# Patient Record
Sex: FEMALE | Race: WHITE | Employment: FULL TIME | URBAN - METROPOLITAN AREA
[De-identification: names, ages, dates, MRNs, and addresses within clinical notes are randomized per-mention and may not be internally consistent; named-entity substitution may affect disease eponyms.]

---

## 2018-03-26 ENCOUNTER — HOSPITAL ENCOUNTER (OUTPATIENT)
Age: 53
Discharge: HOME | End: 2018-03-26

## 2018-03-26 ENCOUNTER — HOSPITAL ENCOUNTER (OUTPATIENT)
Dept: GENERAL RADIOLOGY | Age: 53
Discharge: HOME | End: 2018-03-28

## 2018-03-26 ENCOUNTER — HOSPITAL ENCOUNTER (OUTPATIENT)
Age: 53
End: 2018-03-26

## 2018-03-26 ENCOUNTER — OFFICE VISIT (OUTPATIENT)
Dept: FAMILY MEDICINE CLINIC | Age: 53
End: 2018-03-26
Payer: MEDICAID

## 2018-03-26 VITALS
OXYGEN SATURATION: 96 % | TEMPERATURE: 98 F | BODY MASS INDEX: 34.38 KG/M2 | HEIGHT: 63 IN | RESPIRATION RATE: 18 BRPM | WEIGHT: 194 LBS | HEART RATE: 88 BPM | SYSTOLIC BLOOD PRESSURE: 150 MMHG | DIASTOLIC BLOOD PRESSURE: 94 MMHG

## 2018-03-26 DIAGNOSIS — M25.572 CHRONIC PAIN OF LEFT ANKLE: ICD-10-CM

## 2018-03-26 DIAGNOSIS — G89.29 CHRONIC PAIN OF LEFT ANKLE: ICD-10-CM

## 2018-03-26 DIAGNOSIS — R04.2 HEMOPTYSIS: ICD-10-CM

## 2018-03-26 DIAGNOSIS — J44.9 CHRONIC OBSTRUCTIVE PULMONARY DISEASE, UNSPECIFIED COPD TYPE (HCC): ICD-10-CM

## 2018-03-26 DIAGNOSIS — G56.03 BILATERAL CARPAL TUNNEL SYNDROME: ICD-10-CM

## 2018-03-26 DIAGNOSIS — I10 ESSENTIAL HYPERTENSION: ICD-10-CM

## 2018-03-26 DIAGNOSIS — F32.1 MODERATE SINGLE CURRENT EPISODE OF MAJOR DEPRESSIVE DISORDER (HCC): Primary | ICD-10-CM

## 2018-03-26 DIAGNOSIS — I20.8 STABLE ANGINA PECTORIS (HCC): ICD-10-CM

## 2018-03-26 LAB
ALBUMIN SERPL-MCNC: 3.8 G/DL (ref 3.5–5.2)
ALP BLD-CCNC: 87 U/L (ref 35–104)
ALT SERPL-CCNC: 13 U/L (ref 0–32)
ANION GAP SERPL CALCULATED.3IONS-SCNC: 12 MMOL/L (ref 7–16)
AST SERPL-CCNC: 13 U/L (ref 0–31)
BILIRUB SERPL-MCNC: 0.3 MG/DL (ref 0–1.2)
BUN BLDV-MCNC: 10 MG/DL (ref 6–20)
CALCIUM SERPL-MCNC: 9.2 MG/DL (ref 8.6–10.2)
CHLORIDE BLD-SCNC: 102 MMOL/L (ref 98–107)
CHOLESTEROL, TOTAL: 251 MG/DL (ref 0–199)
CO2: 26 MMOL/L (ref 22–29)
CREAT SERPL-MCNC: 0.6 MG/DL (ref 0.5–1)
GFR AFRICAN AMERICAN: >60
GFR NON-AFRICAN AMERICAN: >60 ML/MIN/1.73
GLUCOSE BLD-MCNC: 77 MG/DL (ref 74–109)
HCT VFR BLD CALC: 48.4 % (ref 34–48)
HDLC SERPL-MCNC: 34 MG/DL
HEMOGLOBIN: 15.9 G/DL (ref 11.5–15.5)
LDL CHOLESTEROL CALCULATED: 166 MG/DL (ref 0–99)
MCH RBC QN AUTO: 32.3 PG (ref 26–35)
MCHC RBC AUTO-ENTMCNC: 32.9 % (ref 32–34.5)
MCV RBC AUTO: 98.2 FL (ref 80–99.9)
PDW BLD-RTO: 13.1 FL (ref 11.5–15)
PLATELET # BLD: 345 E9/L (ref 130–450)
PMV BLD AUTO: 9.1 FL (ref 7–12)
POTASSIUM SERPL-SCNC: 4.3 MMOL/L (ref 3.5–5)
RBC # BLD: 4.93 E12/L (ref 3.5–5.5)
SODIUM BLD-SCNC: 140 MMOL/L (ref 132–146)
TOTAL PROTEIN: 6.9 G/DL (ref 6.4–8.3)
TRIGL SERPL-MCNC: 256 MG/DL (ref 0–149)
TSH SERPL DL<=0.05 MIU/L-ACNC: 1.15 UIU/ML (ref 0.27–4.2)
VLDLC SERPL CALC-MCNC: 51 MG/DL
WBC # BLD: 9.7 E9/L (ref 4.5–11.5)

## 2018-03-26 PROCEDURE — 93010 ELECTROCARDIOGRAM REPORT: CPT | Performed by: FAMILY MEDICINE

## 2018-03-26 PROCEDURE — 93005 ELECTROCARDIOGRAM TRACING: CPT | Performed by: FAMILY MEDICINE

## 2018-03-26 PROCEDURE — 93005 ELECTROCARDIOGRAM TRACING: CPT

## 2018-03-26 PROCEDURE — 85027 COMPLETE CBC AUTOMATED: CPT

## 2018-03-26 PROCEDURE — 80053 COMPREHEN METABOLIC PANEL: CPT

## 2018-03-26 PROCEDURE — 80061 LIPID PANEL: CPT

## 2018-03-26 PROCEDURE — 99212 OFFICE O/P EST SF 10 MIN: CPT | Performed by: FAMILY MEDICINE

## 2018-03-26 PROCEDURE — 36415 COLL VENOUS BLD VENIPUNCTURE: CPT

## 2018-03-26 PROCEDURE — 73610 X-RAY EXAM OF ANKLE: CPT

## 2018-03-26 PROCEDURE — 84443 ASSAY THYROID STIM HORMONE: CPT

## 2018-03-26 PROCEDURE — 99214 OFFICE O/P EST MOD 30 MIN: CPT | Performed by: FAMILY MEDICINE

## 2018-03-26 RX ORDER — PAROXETINE HYDROCHLORIDE 20 MG/1
20 TABLET, FILM COATED ORAL DAILY
Qty: 30 TABLET | Refills: 3 | Status: SHIPPED | OUTPATIENT
Start: 2018-03-26 | End: 2018-05-29 | Stop reason: SDUPTHER

## 2018-03-26 RX ORDER — ALBUTEROL SULFATE 90 MCG
2 HFA AEROSOL WITH ADAPTER (GRAM) INHALATION EVERY 6 HOURS PRN
Qty: 1 INHALER | Refills: 3 | Status: SHIPPED | OUTPATIENT
Start: 2018-03-26 | End: 2018-06-15 | Stop reason: SDUPTHER

## 2018-03-26 ASSESSMENT — PATIENT HEALTH QUESTIONNAIRE - PHQ9
SUM OF ALL RESPONSES TO PHQ QUESTIONS 1-9: 15
7. TROUBLE CONCENTRATING ON THINGS, SUCH AS READING THE NEWSPAPER OR WATCHING TELEVISION: 1
5. POOR APPETITE OR OVEREATING: 0
SUM OF ALL RESPONSES TO PHQ9 QUESTIONS 1 & 2: 6
9. THOUGHTS THAT YOU WOULD BE BETTER OFF DEAD, OR OF HURTING YOURSELF: 1
1. LITTLE INTEREST OR PLEASURE IN DOING THINGS: 3
6. FEELING BAD ABOUT YOURSELF - OR THAT YOU ARE A FAILURE OR HAVE LET YOURSELF OR YOUR FAMILY DOWN: 0
3. TROUBLE FALLING OR STAYING ASLEEP: 3
10. IF YOU CHECKED OFF ANY PROBLEMS, HOW DIFFICULT HAVE THESE PROBLEMS MADE IT FOR YOU TO DO YOUR WORK, TAKE CARE OF THINGS AT HOME, OR GET ALONG WITH OTHER PEOPLE: 2
8. MOVING OR SPEAKING SO SLOWLY THAT OTHER PEOPLE COULD HAVE NOTICED. OR THE OPPOSITE, BEING SO FIGETY OR RESTLESS THAT YOU HAVE BEEN MOVING AROUND A LOT MORE THAN USUAL: 1
4. FEELING TIRED OR HAVING LITTLE ENERGY: 3
2. FEELING DOWN, DEPRESSED OR HOPELESS: 3

## 2018-03-26 NOTE — PROGRESS NOTES
CC:  Follow up chronic left ankle pain, new depression, chest pain. HPI:  50 y.o. female presents for follow up. Diffuse pain, arthritis. Screws in left ankle remain painful. Using cane sometimes. Has not seen ortho recently. Still with pain in all joints, but able to work. COPD, Chronic cough, now a little better, but previously very severe. Had been coughing up blood about 6 months ago, and this gradually improved over following months, but did not seek care at that time. Wants lungs checked again. Still smoking, cutting down. Strong family history of throat and lung CA. Feels she is losing some weight without trying. Elevated BP today, and has been a little higher recently. Does feel stressed. Has had occasional CP with exertion, chronic cough/SOB with COPD. No new symptoms. No HA. Has not taken medication for BP in the past.      Feeling down, depressed. Pain makes this worse. Happening for past 6 months. Has not sought help. Tired, fatigue. Physically and mentally tired. Thoughts about son and dogs keep her from harming herself. Thought about self-harm before, but not currently. Denies SI or HI currently. Lives with ex-, who is supportive of her. Sold gun, no gun in the house. No plans to get another gun. Contracts for safety. Feels anxious often. Stressful job, side of road, [address] Not sleeping well, 3 hours per night. Worries overnight. Feels shaky. Gets up, has cup of coffee. No screen time. Same appetite. Lost some weight without trying, about 13 pounds. Feels down, tearful. Hopeless, guilt. Less interest. No recent menses. Had spotting about 11 months ago, last several months before that. No recent sexual activity. Heaviness in chest with activity, middle of chest, with exertion. Happening for several weeks/months. Some coughing and wheezing. Reproducible with certain amount of activity, lasts about 5-10 minutes. Pressure sensation.   Some

## 2018-03-30 DIAGNOSIS — G89.29 CHRONIC PAIN OF LEFT ANKLE: Primary | ICD-10-CM

## 2018-03-30 DIAGNOSIS — M25.572 CHRONIC PAIN OF LEFT ANKLE: Primary | ICD-10-CM

## 2018-03-30 DIAGNOSIS — T84.9XXS: ICD-10-CM

## 2018-04-24 ENCOUNTER — HOSPITAL ENCOUNTER (OUTPATIENT)
Dept: CT IMAGING | Age: 53
Discharge: HOME | End: 2018-04-26

## 2018-04-24 ENCOUNTER — OFFICE VISIT (OUTPATIENT)
Dept: ORTHOPEDIC SURGERY | Age: 53
End: 2018-04-24

## 2018-04-24 VITALS
WEIGHT: 195 LBS | TEMPERATURE: 97.6 F | RESPIRATION RATE: 18 BRPM | BODY MASS INDEX: 34.55 KG/M2 | SYSTOLIC BLOOD PRESSURE: 140 MMHG | HEART RATE: 79 BPM | DIASTOLIC BLOOD PRESSURE: 92 MMHG | HEIGHT: 63 IN

## 2018-04-24 DIAGNOSIS — M19.172 POST-TRAUMATIC ARTHRITIS OF LEFT ANKLE: ICD-10-CM

## 2018-04-24 DIAGNOSIS — R04.2 HEMOPTYSIS: ICD-10-CM

## 2018-04-24 PROCEDURE — 71260 CT THORAX DX C+: CPT

## 2018-04-24 PROCEDURE — 6360000004 HC RX CONTRAST MEDICATION: Performed by: RADIOLOGY

## 2018-04-24 PROCEDURE — 99203 OFFICE O/P NEW LOW 30 MIN: CPT

## 2018-04-24 PROCEDURE — 2580000003 HC RX 258: Performed by: RADIOLOGY

## 2018-04-24 PROCEDURE — 99203 OFFICE O/P NEW LOW 30 MIN: CPT | Performed by: PHYSICIAN ASSISTANT

## 2018-04-24 RX ORDER — SODIUM CHLORIDE 0.9 % (FLUSH) 0.9 %
10 SYRINGE (ML) INJECTION
Status: COMPLETED | OUTPATIENT
Start: 2018-04-24 | End: 2018-04-24

## 2018-04-24 RX ADMIN — Medication 10 ML: at 09:36

## 2018-04-24 RX ADMIN — IOPAMIDOL 90 ML: 755 INJECTION, SOLUTION INTRAVENOUS at 09:01

## 2018-05-04 ENCOUNTER — TELEPHONE (OUTPATIENT)
Dept: FAMILY MEDICINE CLINIC | Age: 53
End: 2018-05-04

## 2018-05-04 DIAGNOSIS — Z91.030 BEE STING ALLERGY: Primary | ICD-10-CM

## 2018-05-04 RX ORDER — EPINEPHRINE 0.3 MG/.3ML
INJECTION SUBCUTANEOUS
Qty: 2 EACH | Refills: 1 | Status: SHIPPED
Start: 2018-05-04 | End: 2020-07-19 | Stop reason: SDUPTHER

## 2018-05-13 ENCOUNTER — APPOINTMENT (OUTPATIENT)
Dept: GENERAL RADIOLOGY | Age: 53
End: 2018-05-13

## 2018-05-13 ENCOUNTER — HOSPITAL ENCOUNTER (EMERGENCY)
Age: 53
Discharge: HOME | End: 2018-05-13
Attending: EMERGENCY MEDICINE

## 2018-05-13 VITALS
TEMPERATURE: 98.5 F | DIASTOLIC BLOOD PRESSURE: 81 MMHG | HEART RATE: 93 BPM | BODY MASS INDEX: 33.66 KG/M2 | WEIGHT: 190 LBS | RESPIRATION RATE: 20 BRPM | SYSTOLIC BLOOD PRESSURE: 137 MMHG | OXYGEN SATURATION: 95 %

## 2018-05-13 DIAGNOSIS — J44.1 COPD EXACERBATION (HCC): ICD-10-CM

## 2018-05-13 DIAGNOSIS — J40 BRONCHITIS: Primary | ICD-10-CM

## 2018-05-13 LAB
ALBUMIN SERPL-MCNC: 4 G/DL (ref 3.5–5.2)
ALP BLD-CCNC: 100 U/L (ref 35–104)
ALT SERPL-CCNC: 17 U/L (ref 0–32)
ANION GAP SERPL CALCULATED.3IONS-SCNC: 12 MMOL/L (ref 7–16)
AST SERPL-CCNC: 15 U/L (ref 0–31)
BILIRUB SERPL-MCNC: 0.3 MG/DL (ref 0–1.2)
BUN BLDV-MCNC: 8 MG/DL (ref 6–20)
CALCIUM SERPL-MCNC: 9 MG/DL (ref 8.6–10.2)
CHLORIDE BLD-SCNC: 100 MMOL/L (ref 98–107)
CO2: 26 MMOL/L (ref 22–29)
CREAT SERPL-MCNC: 0.7 MG/DL (ref 0.5–1)
GFR AFRICAN AMERICAN: >60
GFR NON-AFRICAN AMERICAN: >60 ML/MIN/1.73
GLUCOSE BLD-MCNC: 124 MG/DL (ref 74–109)
HCT VFR BLD CALC: 50 % (ref 34–48)
HEMOGLOBIN: 16.6 G/DL (ref 11.5–15.5)
INFLUENZA A BY PCR: NOT DETECTED
INFLUENZA B BY PCR: NOT DETECTED
MCH RBC QN AUTO: 32.3 PG (ref 26–35)
MCHC RBC AUTO-ENTMCNC: 33.2 % (ref 32–34.5)
MCV RBC AUTO: 97.3 FL (ref 80–99.9)
PDW BLD-RTO: 13.1 FL (ref 11.5–15)
PLATELET # BLD: 281 E9/L (ref 130–450)
PMV BLD AUTO: 8.9 FL (ref 7–12)
POTASSIUM SERPL-SCNC: 3.9 MMOL/L (ref 3.5–5)
PRO-BNP: 70 PG/ML (ref 0–125)
RBC # BLD: 5.14 E12/L (ref 3.5–5.5)
SODIUM BLD-SCNC: 138 MMOL/L (ref 132–146)
TOTAL PROTEIN: 7.5 G/DL (ref 6.4–8.3)
WBC # BLD: 6.8 E9/L (ref 4.5–11.5)

## 2018-05-13 PROCEDURE — 94664 DEMO&/EVAL PT USE INHALER: CPT

## 2018-05-13 PROCEDURE — 6370000000 HC RX 637 (ALT 250 FOR IP): Performed by: EMERGENCY MEDICINE

## 2018-05-13 PROCEDURE — 99284 EMERGENCY DEPT VISIT MOD MDM: CPT

## 2018-05-13 PROCEDURE — 36415 COLL VENOUS BLD VENIPUNCTURE: CPT

## 2018-05-13 PROCEDURE — 80053 COMPREHEN METABOLIC PANEL: CPT

## 2018-05-13 PROCEDURE — 87502 INFLUENZA DNA AMP PROBE: CPT

## 2018-05-13 PROCEDURE — 71045 X-RAY EXAM CHEST 1 VIEW: CPT

## 2018-05-13 PROCEDURE — 6360000002 HC RX W HCPCS: Performed by: EMERGENCY MEDICINE

## 2018-05-13 PROCEDURE — 96374 THER/PROPH/DIAG INJ IV PUSH: CPT

## 2018-05-13 PROCEDURE — 83880 ASSAY OF NATRIURETIC PEPTIDE: CPT

## 2018-05-13 PROCEDURE — 85027 COMPLETE CBC AUTOMATED: CPT

## 2018-05-13 PROCEDURE — 94640 AIRWAY INHALATION TREATMENT: CPT

## 2018-05-13 RX ORDER — DOXYCYCLINE HYCLATE 100 MG
100 TABLET ORAL 2 TIMES DAILY
Qty: 20 TABLET | Refills: 0 | Status: SHIPPED | OUTPATIENT
Start: 2018-05-13 | End: 2018-05-23

## 2018-05-13 RX ORDER — METHYLPREDNISOLONE 4 MG/1
TABLET ORAL
Qty: 21 TABLET | Refills: 0 | Status: SHIPPED | OUTPATIENT
Start: 2018-05-13 | End: 2018-05-19

## 2018-05-13 RX ORDER — METHYLPREDNISOLONE SODIUM SUCCINATE 125 MG/2ML
125 INJECTION, POWDER, LYOPHILIZED, FOR SOLUTION INTRAMUSCULAR; INTRAVENOUS ONCE
Status: COMPLETED | OUTPATIENT
Start: 2018-05-13 | End: 2018-05-13

## 2018-05-13 RX ORDER — IPRATROPIUM BROMIDE AND ALBUTEROL SULFATE 2.5; .5 MG/3ML; MG/3ML
1 SOLUTION RESPIRATORY (INHALATION)
Status: COMPLETED | OUTPATIENT
Start: 2018-05-13 | End: 2018-05-13

## 2018-05-13 RX ADMIN — IPRATROPIUM BROMIDE AND ALBUTEROL SULFATE 1 AMPULE: 2.5; .5 SOLUTION RESPIRATORY (INHALATION) at 04:01

## 2018-05-13 RX ADMIN — METHYLPREDNISOLONE SODIUM SUCCINATE 125 MG: 125 INJECTION, POWDER, FOR SOLUTION INTRAMUSCULAR; INTRAVENOUS at 03:56

## 2018-05-13 RX ADMIN — IPRATROPIUM BROMIDE AND ALBUTEROL SULFATE 1 AMPULE: 2.5; .5 SOLUTION RESPIRATORY (INHALATION) at 04:04

## 2018-05-13 RX ADMIN — IPRATROPIUM BROMIDE AND ALBUTEROL SULFATE 1 AMPULE: 2.5; .5 SOLUTION RESPIRATORY (INHALATION) at 04:06

## 2018-05-13 ASSESSMENT — PAIN SCALES - GENERAL: PAINLEVEL_OUTOF10: 7

## 2018-05-13 ASSESSMENT — PAIN DESCRIPTION - FREQUENCY: FREQUENCY: INTERMITTENT

## 2018-05-13 ASSESSMENT — PAIN DESCRIPTION - DESCRIPTORS: DESCRIPTORS: TIGHTNESS

## 2018-05-13 ASSESSMENT — PAIN DESCRIPTION - LOCATION: LOCATION: CHEST

## 2018-05-15 ENCOUNTER — OFFICE VISIT (OUTPATIENT)
Dept: FAMILY MEDICINE CLINIC | Age: 53
End: 2018-05-15

## 2018-05-15 VITALS
RESPIRATION RATE: 22 BRPM | OXYGEN SATURATION: 94 % | TEMPERATURE: 98.1 F | SYSTOLIC BLOOD PRESSURE: 136 MMHG | HEIGHT: 65 IN | WEIGHT: 190 LBS | DIASTOLIC BLOOD PRESSURE: 86 MMHG | BODY MASS INDEX: 31.65 KG/M2 | HEART RATE: 76 BPM

## 2018-05-15 DIAGNOSIS — Z12.11 SCREENING FOR COLORECTAL CANCER: ICD-10-CM

## 2018-05-15 DIAGNOSIS — Z12.12 SCREENING FOR COLORECTAL CANCER: ICD-10-CM

## 2018-05-15 DIAGNOSIS — L98.9 FACIAL SKIN LESION: ICD-10-CM

## 2018-05-15 DIAGNOSIS — J30.2 CHRONIC SEASONAL ALLERGIC RHINITIS, UNSPECIFIED TRIGGER: ICD-10-CM

## 2018-05-15 DIAGNOSIS — R91.8 PULMONARY NODULES: Chronic | ICD-10-CM

## 2018-05-15 DIAGNOSIS — Z12.39 BREAST CANCER SCREENING: ICD-10-CM

## 2018-05-15 DIAGNOSIS — J44.1 COPD EXACERBATION (HCC): Primary | ICD-10-CM

## 2018-05-15 PROCEDURE — 99212 OFFICE O/P EST SF 10 MIN: CPT | Performed by: FAMILY MEDICINE

## 2018-05-15 PROCEDURE — 99214 OFFICE O/P EST MOD 30 MIN: CPT | Performed by: FAMILY MEDICINE

## 2018-05-15 PROCEDURE — 6360000002 HC RX W HCPCS

## 2018-05-15 RX ORDER — LORATADINE 10 MG/1
10 TABLET ORAL DAILY
Qty: 30 TABLET | Refills: 5 | Status: SHIPPED | OUTPATIENT
Start: 2018-05-15 | End: 2018-12-17 | Stop reason: SDUPTHER

## 2018-05-15 RX ORDER — BENZONATATE 100 MG/1
100 CAPSULE ORAL 3 TIMES DAILY PRN
Qty: 20 CAPSULE | Refills: 0 | Status: SHIPPED | OUTPATIENT
Start: 2018-05-15 | End: 2018-05-22

## 2018-05-16 PROBLEM — R91.8 PULMONARY NODULES: Chronic | Status: ACTIVE | Noted: 2018-05-16

## 2018-05-16 RX ORDER — LIDOCAINE HYDROCHLORIDE 10 MG/ML
1 INJECTION, SOLUTION EPIDURAL; INFILTRATION; INTRACAUDAL; PERINEURAL ONCE
Status: CANCELLED | OUTPATIENT
Start: 2018-05-17

## 2018-05-16 RX ORDER — TRIAMCINOLONE ACETONIDE 40 MG/ML
40 INJECTION, SUSPENSION INTRA-ARTICULAR; INTRAMUSCULAR ONCE
Status: CANCELLED | OUTPATIENT
Start: 2018-05-17

## 2018-05-16 RX ORDER — BUPIVACAINE HYDROCHLORIDE 2.5 MG/ML
1 INJECTION, SOLUTION EPIDURAL; INFILTRATION; INTRACAUDAL ONCE
Status: CANCELLED | OUTPATIENT
Start: 2018-05-17

## 2018-05-17 ENCOUNTER — HOSPITAL ENCOUNTER (OUTPATIENT)
Dept: INTERVENTIONAL RADIOLOGY/VASCULAR | Age: 53
Discharge: HOME | End: 2018-05-19

## 2018-05-17 ENCOUNTER — NURSE ONLY (OUTPATIENT)
Dept: FAMILY MEDICINE CLINIC | Age: 53
End: 2018-05-17

## 2018-05-17 VITALS
OXYGEN SATURATION: 93 % | HEIGHT: 65 IN | HEART RATE: 68 BPM | WEIGHT: 189 LBS | TEMPERATURE: 97.7 F | BODY MASS INDEX: 31.49 KG/M2 | DIASTOLIC BLOOD PRESSURE: 92 MMHG | SYSTOLIC BLOOD PRESSURE: 156 MMHG | RESPIRATION RATE: 22 BRPM

## 2018-05-17 DIAGNOSIS — M19.172 POST-TRAUMATIC ARTHRITIS OF LEFT ANKLE: ICD-10-CM

## 2018-05-17 DIAGNOSIS — J44.9 CHRONIC OBSTRUCTIVE PULMONARY DISEASE, UNSPECIFIED COPD TYPE (HCC): Primary | ICD-10-CM

## 2018-05-17 LAB
DLCO %PRED: NORMAL
DLCO PRE: NORMAL
FEF 25-75%-POST: NORMAL
FEF 25-75%-PRE: NORMAL
FEV1-POST: NORMAL
FEV1-PRE: NORMAL
FEV1/FVC-POST: NORMAL
FEV1/FVC-PRE: NORMAL
FVC-POST: NORMAL
FVC-PRE: NORMAL
INR BLD: 1
MEP: NORMAL
MIP: NORMAL
PROTHROMBIN TIME: 11.7 SEC (ref 9.3–12.4)
TLC %PRED: NORMAL
TLC PRE: NORMAL

## 2018-05-17 PROCEDURE — 94060 EVALUATION OF WHEEZING: CPT | Performed by: FAMILY MEDICINE

## 2018-05-17 PROCEDURE — 6360000002 HC RX W HCPCS: Performed by: PHYSICIAN ASSISTANT

## 2018-05-17 PROCEDURE — 7100000011 HC PHASE II RECOVERY - ADDTL 15 MIN

## 2018-05-17 PROCEDURE — 77002 NEEDLE LOCALIZATION BY XRAY: CPT | Performed by: RADIOLOGY

## 2018-05-17 PROCEDURE — 2500000003 HC RX 250 WO HCPCS: Performed by: PHYSICIAN ASSISTANT

## 2018-05-17 PROCEDURE — 85610 PROTHROMBIN TIME: CPT

## 2018-05-17 PROCEDURE — 36415 COLL VENOUS BLD VENIPUNCTURE: CPT

## 2018-05-17 PROCEDURE — 7100000010 HC PHASE II RECOVERY - FIRST 15 MIN

## 2018-05-17 PROCEDURE — 20605 DRAIN/INJ JOINT/BURSA W/O US: CPT | Performed by: RADIOLOGY

## 2018-05-17 RX ORDER — TRIAMCINOLONE ACETONIDE 40 MG/ML
40 INJECTION, SUSPENSION INTRA-ARTICULAR; INTRAMUSCULAR ONCE
Status: COMPLETED | OUTPATIENT
Start: 2018-05-17 | End: 2018-05-17

## 2018-05-17 RX ORDER — BUPIVACAINE HYDROCHLORIDE 2.5 MG/ML
1 INJECTION, SOLUTION EPIDURAL; INFILTRATION; INTRACAUDAL ONCE
Status: COMPLETED | OUTPATIENT
Start: 2018-05-17 | End: 2018-05-17

## 2018-05-17 RX ORDER — LIDOCAINE HYDROCHLORIDE 10 MG/ML
1 INJECTION, SOLUTION EPIDURAL; INFILTRATION; INTRACAUDAL; PERINEURAL ONCE
Status: COMPLETED | OUTPATIENT
Start: 2018-05-17 | End: 2018-05-17

## 2018-05-17 RX ORDER — ALBUTEROL SULFATE 2.5 MG/3ML
2.5 SOLUTION RESPIRATORY (INHALATION) ONCE
Status: COMPLETED | OUTPATIENT
Start: 2018-05-17 | End: 2018-05-17

## 2018-05-17 RX ADMIN — LIDOCAINE HYDROCHLORIDE 1 ML: 10 INJECTION, SOLUTION EPIDURAL; INFILTRATION; INTRACAUDAL; PERINEURAL at 11:08

## 2018-05-17 RX ADMIN — TRIAMCINOLONE ACETONIDE 40 MG: 40 INJECTION, SUSPENSION INTRA-ARTICULAR; INTRAMUSCULAR at 11:08

## 2018-05-17 RX ADMIN — ALBUTEROL SULFATE 2.5 MG: 2.5 SOLUTION RESPIRATORY (INHALATION) at 16:21

## 2018-05-17 RX ADMIN — BUPIVACAINE HYDROCHLORIDE 2.5 MG: 2.5 INJECTION, SOLUTION EPIDURAL; INFILTRATION; INTRACAUDAL at 11:08

## 2018-05-17 ASSESSMENT — PAIN - FUNCTIONAL ASSESSMENT: PAIN_FUNCTIONAL_ASSESSMENT: 0-10

## 2018-05-18 DIAGNOSIS — J44.9 CHRONIC OBSTRUCTIVE PULMONARY DISEASE, UNSPECIFIED COPD TYPE (HCC): Primary | ICD-10-CM

## 2018-05-18 RX ORDER — IPRATROPIUM BROMIDE AND ALBUTEROL SULFATE 2.5; .5 MG/3ML; MG/3ML
1 SOLUTION RESPIRATORY (INHALATION) EVERY 6 HOURS PRN
Qty: 360 ML | Refills: 1 | Status: SHIPPED | OUTPATIENT
Start: 2018-05-18 | End: 2019-02-11 | Stop reason: SDUPTHER

## 2018-05-22 ENCOUNTER — OFFICE VISIT (OUTPATIENT)
Dept: FAMILY MEDICINE CLINIC | Age: 53
End: 2018-05-22

## 2018-05-22 VITALS
BODY MASS INDEX: 31.65 KG/M2 | OXYGEN SATURATION: 97 % | HEIGHT: 65 IN | HEART RATE: 76 BPM | DIASTOLIC BLOOD PRESSURE: 84 MMHG | SYSTOLIC BLOOD PRESSURE: 129 MMHG | TEMPERATURE: 98.2 F | RESPIRATION RATE: 20 BRPM | WEIGHT: 190 LBS

## 2018-05-22 DIAGNOSIS — J44.9 CHRONIC OBSTRUCTIVE PULMONARY DISEASE, UNSPECIFIED COPD TYPE (HCC): Primary | ICD-10-CM

## 2018-05-22 PROCEDURE — 99213 OFFICE O/P EST LOW 20 MIN: CPT | Performed by: FAMILY MEDICINE

## 2018-05-22 PROCEDURE — 6360000002 HC RX W HCPCS

## 2018-05-22 PROCEDURE — 94640 AIRWAY INHALATION TREATMENT: CPT | Performed by: FAMILY MEDICINE

## 2018-05-22 PROCEDURE — 99212 OFFICE O/P EST SF 10 MIN: CPT | Performed by: FAMILY MEDICINE

## 2018-05-22 RX ORDER — ALBUTEROL SULFATE 2.5 MG/3ML
2.5 SOLUTION RESPIRATORY (INHALATION) ONCE
Status: COMPLETED | OUTPATIENT
Start: 2018-05-22 | End: 2018-05-22

## 2018-05-22 RX ADMIN — ALBUTEROL SULFATE 2.5 MG: 2.5 SOLUTION RESPIRATORY (INHALATION) at 11:11

## 2018-05-22 RX ADMIN — Medication 0.5 MG: at 11:12

## 2018-05-22 ASSESSMENT — ENCOUNTER SYMPTOMS
CONSTIPATION: 0
SORE THROAT: 0
COUGH: 1
DOUBLE VISION: 0
NAUSEA: 0
DIARRHEA: 0
WHEEZING: 1
VOMITING: 0
SPUTUM PRODUCTION: 0
BLURRED VISION: 0
SHORTNESS OF BREATH: 1
ABDOMINAL PAIN: 0
BACK PAIN: 0

## 2018-05-24 ENCOUNTER — HOSPITAL ENCOUNTER (OUTPATIENT)
Dept: GENERAL RADIOLOGY | Age: 53
Discharge: HOME | End: 2018-05-26
Payer: COMMERCIAL

## 2018-05-24 DIAGNOSIS — Z12.39 BREAST CANCER SCREENING: ICD-10-CM

## 2018-05-24 PROCEDURE — 77067 SCR MAMMO BI INCL CAD: CPT

## 2018-05-29 ENCOUNTER — OFFICE VISIT (OUTPATIENT)
Dept: FAMILY MEDICINE CLINIC | Age: 53
End: 2018-05-29

## 2018-05-29 VITALS
DIASTOLIC BLOOD PRESSURE: 71 MMHG | BODY MASS INDEX: 33.29 KG/M2 | TEMPERATURE: 98 F | HEART RATE: 68 BPM | WEIGHT: 195 LBS | SYSTOLIC BLOOD PRESSURE: 110 MMHG | HEIGHT: 64 IN | RESPIRATION RATE: 20 BRPM

## 2018-05-29 DIAGNOSIS — F32.1 MODERATE SINGLE CURRENT EPISODE OF MAJOR DEPRESSIVE DISORDER (HCC): ICD-10-CM

## 2018-05-29 DIAGNOSIS — E78.00 PURE HYPERCHOLESTEROLEMIA: ICD-10-CM

## 2018-05-29 DIAGNOSIS — J44.1 COPD EXACERBATION (HCC): ICD-10-CM

## 2018-05-29 PROCEDURE — 99213 OFFICE O/P EST LOW 20 MIN: CPT | Performed by: STUDENT IN AN ORGANIZED HEALTH CARE EDUCATION/TRAINING PROGRAM

## 2018-05-29 PROCEDURE — 99212 OFFICE O/P EST SF 10 MIN: CPT | Performed by: STUDENT IN AN ORGANIZED HEALTH CARE EDUCATION/TRAINING PROGRAM

## 2018-05-29 RX ORDER — PAROXETINE HYDROCHLORIDE 20 MG/1
20 TABLET, FILM COATED ORAL DAILY
Qty: 30 TABLET | Refills: 3 | Status: SHIPPED | OUTPATIENT
Start: 2018-05-29 | End: 2018-09-10 | Stop reason: SDUPTHER

## 2018-05-29 RX ORDER — ATORVASTATIN CALCIUM 20 MG/1
20 TABLET, FILM COATED ORAL DAILY
Qty: 90 TABLET | Refills: 3 | Status: SHIPPED | OUTPATIENT
Start: 2018-05-29 | End: 2019-02-11 | Stop reason: SDUPTHER

## 2018-06-04 ENCOUNTER — TELEPHONE (OUTPATIENT)
Dept: FAMILY MEDICINE CLINIC | Age: 53
End: 2018-06-04

## 2018-06-04 ENCOUNTER — TELEPHONE (OUTPATIENT)
Dept: SURGERY | Age: 53
End: 2018-06-04

## 2018-06-07 ENCOUNTER — HOSPITAL ENCOUNTER (OUTPATIENT)
Age: 53
Discharge: HOME | End: 2018-06-09
Payer: MEDICARE

## 2018-06-07 ENCOUNTER — OFFICE VISIT (OUTPATIENT)
Dept: FAMILY MEDICINE CLINIC | Age: 53
End: 2018-06-07
Payer: MEDICARE

## 2018-06-07 ENCOUNTER — APPOINTMENT (OUTPATIENT)
Dept: CT IMAGING | Age: 53
End: 2018-06-07
Payer: MEDICARE

## 2018-06-07 ENCOUNTER — HOSPITAL ENCOUNTER (OUTPATIENT)
Dept: CT IMAGING | Age: 53
Discharge: HOME | End: 2018-06-09
Payer: MEDICARE

## 2018-06-07 VITALS
DIASTOLIC BLOOD PRESSURE: 73 MMHG | OXYGEN SATURATION: 98 % | HEIGHT: 64 IN | WEIGHT: 198 LBS | HEART RATE: 75 BPM | SYSTOLIC BLOOD PRESSURE: 122 MMHG | BODY MASS INDEX: 33.8 KG/M2

## 2018-06-07 DIAGNOSIS — R10.819 LEFT FLANK TENDERNESS: ICD-10-CM

## 2018-06-07 DIAGNOSIS — R31.9 HEMATURIA, UNSPECIFIED TYPE: ICD-10-CM

## 2018-06-07 DIAGNOSIS — J44.9 CHRONIC OBSTRUCTIVE PULMONARY DISEASE, UNSPECIFIED COPD TYPE (HCC): ICD-10-CM

## 2018-06-07 DIAGNOSIS — R06.02 SHORTNESS OF BREATH: Primary | ICD-10-CM

## 2018-06-07 DIAGNOSIS — R73.9 ELEVATED BLOOD SUGAR: ICD-10-CM

## 2018-06-07 LAB
BACTERIA: ABNORMAL /HPF
BILIRUBIN URINE: NEGATIVE
BILIRUBIN, POC: NEGATIVE
BLOOD URINE, POC: NORMAL
BLOOD, URINE: ABNORMAL
CLARITY, POC: CLEAR
CLARITY: CLEAR
COLOR, POC: YELLOW
COLOR: YELLOW
CRYSTALS, UA: ABNORMAL
GLUCOSE URINE, POC: NEGATIVE
GLUCOSE URINE: NEGATIVE MG/DL
HBA1C MFR BLD: 5.7 %
KETONES, POC: NEGATIVE
KETONES, URINE: NEGATIVE MG/DL
LEUKOCYTE EST, POC: NEGATIVE
LEUKOCYTE ESTERASE, URINE: NEGATIVE
NITRITE, POC: NEGATIVE
NITRITE, URINE: NEGATIVE
PH UA: 5.5 (ref 5–9)
PH, POC: 5.5
PROTEIN UA: NEGATIVE MG/DL
PROTEIN, POC: NEGATIVE
RBC UA: ABNORMAL /HPF (ref 0–2)
SPECIFIC GRAVITY UA: 1.02 (ref 1–1.03)
SPECIFIC GRAVITY, POC: 1.02
UROBILINOGEN, POC: 0.2
UROBILINOGEN, URINE: 0.2 E.U./DL
WBC UA: ABNORMAL /HPF (ref 0–5)

## 2018-06-07 PROCEDURE — G8926 SPIRO NO PERF OR DOC: HCPCS | Performed by: FAMILY MEDICINE

## 2018-06-07 PROCEDURE — 3023F SPIROM DOC REV: CPT | Performed by: FAMILY MEDICINE

## 2018-06-07 PROCEDURE — 3017F COLORECTAL CA SCREEN DOC REV: CPT | Performed by: FAMILY MEDICINE

## 2018-06-07 PROCEDURE — G8417 CALC BMI ABV UP PARAM F/U: HCPCS | Performed by: FAMILY MEDICINE

## 2018-06-07 PROCEDURE — G8427 DOCREV CUR MEDS BY ELIG CLIN: HCPCS | Performed by: FAMILY MEDICINE

## 2018-06-07 PROCEDURE — 83036 HEMOGLOBIN GLYCOSYLATED A1C: CPT | Performed by: FAMILY MEDICINE

## 2018-06-07 PROCEDURE — 81002 URINALYSIS NONAUTO W/O SCOPE: CPT | Performed by: FAMILY MEDICINE

## 2018-06-07 PROCEDURE — 81001 URINALYSIS AUTO W/SCOPE: CPT

## 2018-06-07 PROCEDURE — 1036F TOBACCO NON-USER: CPT | Performed by: FAMILY MEDICINE

## 2018-06-07 PROCEDURE — 99213 OFFICE O/P EST LOW 20 MIN: CPT | Performed by: FAMILY MEDICINE

## 2018-06-07 PROCEDURE — G8599 NO ASA/ANTIPLAT THER USE RNG: HCPCS | Performed by: FAMILY MEDICINE

## 2018-06-07 PROCEDURE — 74176 CT ABD & PELVIS W/O CONTRAST: CPT

## 2018-06-07 PROCEDURE — 99212 OFFICE O/P EST SF 10 MIN: CPT | Performed by: FAMILY MEDICINE

## 2018-06-07 NOTE — PROGRESS NOTES
[facility] Outpatient Resident Progress Note       S: HPI : Malgorzata Grijalva  50 y.o. who presented for COPD (Pt needs FMLA papers filled out )    COPD exacerbation: symptoms start 5/9 completed doxy and steroids. Still having symptoms - \"lungs hurt. \" Pointed to the back, L side. Happens in evening under the L breast at times. Feels SOB as well. Sighs a lot recently. Feels chest heaviness with the breast pain. Slight dry coughing, no sputum. Does get out of breath with walking. Quit smoking mother's day. Cough is not worse at night. Lays flat to sleep. Pt is a  and symptoms get exacerbated while working outside. Looking to get more FMLA approved. Elevated BS: No urinary frequency/incontinence. No dysuria. No hematuria. No polyphagia. I reviewed the patient's past medications, allergies and past medical history during this visit    Social: Malgorzata Grijalva  reports that she quit smoking about 4 weeks ago. Her smoking use included Cigarettes. She has a 36.00 pack-year smoking history. She has never used smokeless tobacco. She reports that she does not drink alcohol or use drugs. ROS:  See pertinent ROS as listed in HPI    O: PE: Vitals : Blood pressure 122/73, pulse 75, height 5' 4\" (1.626 m), weight 198 lb (89.8 kg), last menstrual period 09/13/2012, SpO2 98 %, not currently breastfeeding. CONSTITUTIONAL:  awake, alert, cooperative, non-distressed, appears stated age  LUNGS:  No increased work of breathing, good air exchange, clear to auscultation bilaterally, no crackles or wheezing  CARDIOVASCULAR:  RRR, normal S1 and S2, and no murmur noted, no edema of the lower extermities   ABDOMEN:  SNTND, normal bowel sounds  BACK: L flank pain   SKIN:  Warm and dry    Last labs:    Last labs reviewed; CXR negative for consolidation, bony findings    A / P:   Diagnosis Orders   1.  Shortness of breath  NM MYOCARDIAL SPECT REST EXERCISE OR RX    [facility]- Scotty Albert

## 2018-06-12 ENCOUNTER — HOSPITAL ENCOUNTER (OUTPATIENT)
Age: 53
Discharge: HOME | End: 2018-06-14
Payer: MEDICARE

## 2018-06-12 ENCOUNTER — OFFICE VISIT (OUTPATIENT)
Dept: FAMILY MEDICINE CLINIC | Age: 53
End: 2018-06-12
Payer: MEDICARE

## 2018-06-12 VITALS
WEIGHT: 199.9 LBS | OXYGEN SATURATION: 97 % | HEIGHT: 65 IN | BODY MASS INDEX: 33.3 KG/M2 | DIASTOLIC BLOOD PRESSURE: 79 MMHG | SYSTOLIC BLOOD PRESSURE: 119 MMHG | HEART RATE: 80 BPM | TEMPERATURE: 98.2 F | RESPIRATION RATE: 16 BRPM

## 2018-06-12 DIAGNOSIS — N88.8 CERVICAL DISCHARGE: ICD-10-CM

## 2018-06-12 DIAGNOSIS — Z12.4 ENCOUNTER FOR SCREENING FOR CERVICAL CANCER: Primary | ICD-10-CM

## 2018-06-12 PROCEDURE — G0101 CA SCREEN;PELVIC/BREAST EXAM: HCPCS | Performed by: FAMILY MEDICINE

## 2018-06-12 PROCEDURE — 87624 HPV HI-RISK TYP POOLED RSLT: CPT

## 2018-06-12 PROCEDURE — 99213 OFFICE O/P EST LOW 20 MIN: CPT | Performed by: FAMILY MEDICINE

## 2018-06-12 PROCEDURE — P3000 SCREEN PAP BY TECH W MD SUPV: HCPCS | Performed by: FAMILY MEDICINE

## 2018-06-12 PROCEDURE — 88175 CYTOPATH C/V AUTO FLUID REDO: CPT

## 2018-06-13 ENCOUNTER — OFFICE VISIT (OUTPATIENT)
Dept: FAMILY MEDICINE CLINIC | Age: 53
End: 2018-06-13
Payer: MEDICARE

## 2018-06-13 VITALS
TEMPERATURE: 98.2 F | HEIGHT: 65 IN | BODY MASS INDEX: 33.37 KG/M2 | SYSTOLIC BLOOD PRESSURE: 126 MMHG | HEART RATE: 78 BPM | WEIGHT: 200.3 LBS | OXYGEN SATURATION: 96 % | RESPIRATION RATE: 20 BRPM | DIASTOLIC BLOOD PRESSURE: 79 MMHG

## 2018-06-13 DIAGNOSIS — J44.9 CHRONIC OBSTRUCTIVE PULMONARY DISEASE, UNSPECIFIED COPD TYPE (HCC): ICD-10-CM

## 2018-06-13 PROCEDURE — 99213 OFFICE O/P EST LOW 20 MIN: CPT | Performed by: STUDENT IN AN ORGANIZED HEALTH CARE EDUCATION/TRAINING PROGRAM

## 2018-06-15 DIAGNOSIS — J44.9 CHRONIC OBSTRUCTIVE PULMONARY DISEASE, UNSPECIFIED COPD TYPE (HCC): ICD-10-CM

## 2018-06-18 ENCOUNTER — TELEPHONE (OUTPATIENT)
Dept: FAMILY MEDICINE CLINIC | Age: 53
End: 2018-06-18

## 2018-06-18 RX ORDER — ALBUTEROL SULFATE 90 UG/1
2 AEROSOL, METERED RESPIRATORY (INHALATION) EVERY 6 HOURS PRN
Qty: 1 INHALER | Refills: 3 | Status: SHIPPED | OUTPATIENT
Start: 2018-06-18 | End: 2019-02-11 | Stop reason: SDUPTHER

## 2018-06-20 LAB
CORRESPONDING PAP CASE #: NORMAL
HPV, HIGH RISK: NEGATIVE

## 2018-06-22 ENCOUNTER — HOSPITAL ENCOUNTER (OUTPATIENT)
Dept: ULTRASOUND IMAGING | Age: 53
Discharge: HOME | End: 2018-06-24
Payer: MEDICARE

## 2018-06-22 DIAGNOSIS — N88.8 CERVICAL DISCHARGE: ICD-10-CM

## 2018-06-22 PROCEDURE — 76830 TRANSVAGINAL US NON-OB: CPT

## 2018-06-25 ENCOUNTER — TELEPHONE (OUTPATIENT)
Dept: FAMILY MEDICINE CLINIC | Age: 53
End: 2018-06-25

## 2018-06-26 ENCOUNTER — HOSPITAL ENCOUNTER (OUTPATIENT)
Age: 53
Discharge: HOME | End: 2018-06-28
Payer: MEDICARE

## 2018-06-26 ENCOUNTER — OFFICE VISIT (OUTPATIENT)
Dept: FAMILY MEDICINE CLINIC | Age: 53
End: 2018-06-26
Payer: MEDICARE

## 2018-06-26 VITALS
WEIGHT: 202.1 LBS | SYSTOLIC BLOOD PRESSURE: 131 MMHG | RESPIRATION RATE: 14 BRPM | OXYGEN SATURATION: 97 % | TEMPERATURE: 97.8 F | DIASTOLIC BLOOD PRESSURE: 84 MMHG | BODY MASS INDEX: 33.67 KG/M2 | HEART RATE: 78 BPM | HEIGHT: 65 IN

## 2018-06-26 DIAGNOSIS — N95.0 POSTMENOPAUSAL BLEEDING: ICD-10-CM

## 2018-06-26 DIAGNOSIS — R93.89 THICKENED ENDOMETRIUM: Primary | ICD-10-CM

## 2018-06-26 PROCEDURE — 58100 BIOPSY OF UTERUS LINING: CPT | Performed by: FAMILY MEDICINE

## 2018-06-26 PROCEDURE — 88305 TISSUE EXAM BY PATHOLOGIST: CPT | Performed by: FAMILY MEDICINE

## 2018-06-26 PROCEDURE — 88305 TISSUE EXAM BY PATHOLOGIST: CPT

## 2018-06-26 PROCEDURE — 99212 OFFICE O/P EST SF 10 MIN: CPT | Performed by: FAMILY MEDICINE

## 2018-06-26 NOTE — PROGRESS NOTES
S: 50 y.o. female here for endometrial biopsy. She is postmenopausal and has had intermittent bleeding 1-2 times per year. Recent tv u/s showed a 5 mm endometrial stripe. Pap this month had negative cytology. O: VS: /84 (Site: Right Arm, Position: Sitting, Cuff Size: Medium Adult)   Pulse 78   Temp 97.8 °F (36.6 °C) (Oral)   Resp 14   Ht 5' 5\" (1.651 m)   Wt 202 lb 1.6 oz (91.7 kg)   LMP 09/13/2012   SpO2 97%   Breastfeeding? No   BMI 33.63 kg/m²    General: NAD   CV:  RRR, no gallops, rubs, or murmurs   Resp: CTAB no R/R/W   Abd:  Soft, nontender, no masses    Ext:  no C/C/E  Normal uterus size, nonttp    Impression/Plan:   Irregular bleeding-Endometrial bx  Indication : postmenopausal bleeding. She was fully informed about the indication, risks ( bleeding, infection, uterine perforation) and alternatives. She agrees to proceed. After a bimanual examination , the cervix was painted with an antiseptic solution. The anterior lip was held with a tenaculum. A pipelle was introduced into the endometrial cavity up to the fundus and sounded to 6 cm and a 2 specimens were obtained to send to pathology. The instruments were removed and hemostasis secured. Minimal bleeding occurred. The patient tolerated the procedure well. Post-procedure instructions were given verbally and in writing. The patient was advised to schedule a follow-up in 2 weeks. Attending Physician Statement  I have discussed the case, including pertinent history and exam findings with the resident. I also have seen the patient and performed key portions of the examination. I agree with the documented assessment and plan. I was present for the entire procedure and obtained the biopsy.     Ashlee Rogers MD

## 2018-06-27 ENCOUNTER — OFFICE VISIT (OUTPATIENT)
Dept: SURGERY | Age: 53
End: 2018-06-27
Payer: MEDICARE

## 2018-06-27 ENCOUNTER — HOSPITAL ENCOUNTER (OUTPATIENT)
Age: 53
Discharge: HOME | End: 2018-06-29
Payer: MEDICARE

## 2018-06-27 VITALS
OXYGEN SATURATION: 98 % | HEART RATE: 73 BPM | HEIGHT: 65 IN | TEMPERATURE: 97.8 F | WEIGHT: 200 LBS | BODY MASS INDEX: 33.32 KG/M2 | SYSTOLIC BLOOD PRESSURE: 120 MMHG | DIASTOLIC BLOOD PRESSURE: 75 MMHG

## 2018-06-27 DIAGNOSIS — L98.9 SKIN LESION: Primary | ICD-10-CM

## 2018-06-27 PROCEDURE — 99204 OFFICE O/P NEW MOD 45 MIN: CPT | Performed by: PLASTIC SURGERY

## 2018-06-27 PROCEDURE — G8599 NO ASA/ANTIPLAT THER USE RNG: HCPCS | Performed by: PLASTIC SURGERY

## 2018-06-27 PROCEDURE — 3017F COLORECTAL CA SCREEN DOC REV: CPT | Performed by: PLASTIC SURGERY

## 2018-06-27 PROCEDURE — G8417 CALC BMI ABV UP PARAM F/U: HCPCS | Performed by: PLASTIC SURGERY

## 2018-06-27 PROCEDURE — G8427 DOCREV CUR MEDS BY ELIG CLIN: HCPCS | Performed by: PLASTIC SURGERY

## 2018-06-27 PROCEDURE — 11101 PR BIOPSY, EACH ADDED LESION: CPT | Performed by: PLASTIC SURGERY

## 2018-06-27 PROCEDURE — 11100 PR OFFICE/OUTPT VISIT,PROCEDURE ONLY: CPT | Performed by: PLASTIC SURGERY

## 2018-06-27 PROCEDURE — 88305 TISSUE EXAM BY PATHOLOGIST: CPT

## 2018-06-27 PROCEDURE — 1036F TOBACCO NON-USER: CPT | Performed by: PLASTIC SURGERY

## 2018-06-27 RX ORDER — LIDOCAINE HYDROCHLORIDE AND EPINEPHRINE 10; 10 MG/ML; UG/ML
3 INJECTION, SOLUTION INFILTRATION; PERINEURAL ONCE
Status: COMPLETED | OUTPATIENT
Start: 2018-06-27 | End: 2018-06-27

## 2018-06-27 RX ADMIN — LIDOCAINE HYDROCHLORIDE AND EPINEPHRINE 3 ML: 10; 10 INJECTION, SOLUTION INFILTRATION; PERINEURAL at 16:15

## 2018-06-27 NOTE — PROGRESS NOTES
asymmetry, and need for further procedures. All of Her questions were answered to their satisfaction and She agrees to proceed with the procedure.  -After consent was obtained, the area was cleansed with an alcohol swab. Local anesthesia consisting of 1% lidocaine with 1:100,000 epinepherine was injected  surrounding the area. The local was allowed to work. Using a 10-blade the lesion was shaved, hemostasis was obtained and a bandage was placed. The procedure was performed by antonette husain    I have discussed with the patient that they should make every attempt to follow-up within this time interval in the event that the pathology or radiographic findings require further attention such as surgical or other medical intervention. They voiced complete understanding. The patient was educated to keep the bandage in place and apply bacitracin to the wound site BID. OK to shower at this time. Advised the patient that they can allow soap and water to rinse of the wound site while showering. Once they are done in the shower they are to pat dry the incision site with a clean paper towel. No baths, hot tubs or soaking of the wound site at this time. Pt voices understanding. Photos obtained    I attest that the patient was seen and examined by me, and concur with the documentation above. I agree with the assessment and the plan outlined. This document is generated, in part, by voice recognition software and thus  syntax and grammatical errors are possible.     Rakan Husain  11:30 AM  7/12/2018

## 2018-07-02 ENCOUNTER — OFFICE VISIT (OUTPATIENT)
Dept: SURGERY | Age: 53
End: 2018-07-02
Payer: MEDICARE

## 2018-07-02 VITALS
RESPIRATION RATE: 18 BRPM | HEART RATE: 74 BPM | BODY MASS INDEX: 33.66 KG/M2 | DIASTOLIC BLOOD PRESSURE: 77 MMHG | SYSTOLIC BLOOD PRESSURE: 114 MMHG | HEIGHT: 65 IN | WEIGHT: 202 LBS | OXYGEN SATURATION: 96 %

## 2018-07-02 DIAGNOSIS — Z12.11 ENCOUNTER FOR SCREENING COLONOSCOPY: ICD-10-CM

## 2018-07-02 PROCEDURE — 3017F COLORECTAL CA SCREEN DOC REV: CPT | Performed by: SURGERY

## 2018-07-02 PROCEDURE — 99202 OFFICE O/P NEW SF 15 MIN: CPT | Performed by: SURGERY

## 2018-07-02 PROCEDURE — 99999 PR OFFICE/OUTPT VISIT,PROCEDURE ONLY: CPT | Performed by: SURGERY

## 2018-07-02 PROCEDURE — G8427 DOCREV CUR MEDS BY ELIG CLIN: HCPCS | Performed by: SURGERY

## 2018-07-02 PROCEDURE — 1036F TOBACCO NON-USER: CPT | Performed by: SURGERY

## 2018-07-02 PROCEDURE — G8417 CALC BMI ABV UP PARAM F/U: HCPCS | Performed by: SURGERY

## 2018-07-02 PROCEDURE — G8599 NO ASA/ANTIPLAT THER USE RNG: HCPCS | Performed by: SURGERY

## 2018-07-02 RX ORDER — POLYETHYLENE GLYCOL 3350, SODIUM CHLORIDE, POTASSIUM CHLORIDE, SODIUM BICARBONATE, AND SODIUM SULFATE 240; 5.84; 2.98; 6.72; 22.72 G/4L; G/4L; G/4L; G/4L; G/4L
4000 POWDER, FOR SOLUTION ORAL ONCE
Qty: 1 BOTTLE | Refills: 0 | Status: SHIPPED | OUTPATIENT
Start: 2018-07-02 | End: 2018-07-02

## 2018-07-02 NOTE — PROGRESS NOTES
I was present for the pertinent part of the history and physical. I agree with the resident's assessment and plan. She is here for age-related colon cancer screening. She has no lower GI related symptoms. She has negative personal family history carcinoma colon. Exam unremarkable    We will proceed with colonoscopy as an outpatient. The patient was explained the risks/benefits/alternatives/expected outcomes of the procedure. The patient was explained the risks of the procedure, including, but not limited to, the risk of reaction to the anesthesia medicine and the risk of perforation requiring further surgery. The patient was informed that they may require biopsy or polypectomy. These procedures may increase the risk of complication. All questions were answered. The patient verbalized understanding and agreed to proceed.

## 2018-07-02 NOTE — PATIENT INSTRUCTIONS
procedure. Any stool left in the intestine will block the view. This preparation may start several days before the procedure. Follow your doctor's instructions. Leading up to your procedure:   Talk to your doctor about your medicines. You may be asked to stop taking some medicines up to one week before the procedure, like:   Anti-inflammatory drugs (e.g., aspirin )   Blood thinners like clopidogrel (Plavix) or warfarin (Coumadin)   Iron supplements or vitamins containing iron   The day or days before your procedure, go on a clear liquid diet (clear broth, clear juice, clear jello) with no red coloring  Do not eat or drink anything after midnight. Wear comfortable clothing. If you have diabetes, ask your doctor if you need to adjust your diabetes medicine on the day prior to your procedure and the day of your procedure. Arrange for a ride home after the procedure. Anesthesia   You will receive intravenous sedation medicine for the procedure so you will not feel anything during the procedure. Description of the Procedure   You will lie on your left side with knees bent and drawn up toward your chest. The colonoscope will be slowly inserted through the rectum and into the bowel. The colonoscope will inject air into the colon. A small attached video camera will allow the doctor to view the colon's lining on a screen. The doctor will continue guiding the tool through the bowel and assess the lining. A tissue sample or polyps may be removed during the procedure. How Long Will It Take? Usually it takes about 30 to 45 minutes     Will It Hurt? Most people do not feel anything during the procedure and will not remember the procedure. After the procedure, gas pains and cramping are common. These pains should go away with the passing of gas. Post-procedure Care   If any tissue was removed: It will be sent to a lab to be examined. It may take 1-2 weeks for results.  The doctor will usually give reschedule the procedure for another time. The importance of proper hydration  A colonoscopy prep causes the body to lose a significant amount of fluid and can result in sickness due to dehydration. It's important that you prepare your body by drinking extra clear liquids before the prep. Stay hydrated by drinking all required clear liquids during the prep. Replenish your system by drinking clear liquids after returning home from your colonoscopy. IF YOU HAVE ANY QUESTIONS OR CONCERNS PLEASE CALL [address] [facility] .[phone].

## 2018-07-02 NOTE — PROGRESS NOTES
[address]  HISTORY AND PHYSICAL  7/2/2018    Physician Consulted: Dr. Bebeto eRddy  Reason for Consult: screening colonoscopy  Referring Physician: Dr. Yanet Rene    HPI  Jason Caceres is a 50 y.o. female who presents for evaluation for screening colonoscopy. She denies any nausea, vomiting, diarrhea, constipation, hematochezia, or melena. No fevers, chills or unexpected weight loss. She has bowel movements about daily without problems. She does have longstanding history of intermittent heartburn about once or twice a month, with good symptom relief with TUMS. She is not interested in EGD. PMH notable for depression and COPD.    PSH: umbilical hernia repair, multiple orthopedic surgeries. No prior EGD or colonoscopy    NKDA    Social history: previous smoker, 2-3 packs daily for 30 years. Denies current ETOH use. Denies illicit drug use. No family history of colon cancer. Family history notable for father with esophageal cancer. Past Medical History:   Diagnosis Date    Adhesive capsulitis of right shoulder     s/p rotator cuff injury and repair; follows with Dr. Sweetie Lindsey at [facility].  Alcoholism ([facility] [address]     Quit 24 years ago.  Allergic rhinosinusitis     Asthma     Rare use of albuterol.  Bilateral carpal tunnel syndrome 3/26/2018    COPD (chronic obstructive pulmonary disease) (HCC)     Hidradenitis suppurativa 4/17/2012    Hyperlipidemia     on statin    Hypertension     No longer on medications.  Intertrigo 4/17/2012    Moderate single current episode of major depressive disorder ([facility] [address] 3/26/2018    Onychomycosis 4/17/2012    Tobacco abuse        Past Surgical History:   Procedure Laterality Date    ANKLE SURGERY      FRACTURE SURGERY  1994    Left Tibia    FRACTURE SURGERY      Multiple, different injuries    HERNIA REPAIR  [address]    Umbilical hernia    ROTATOR CUFF REPAIR  10/23/2012    Dr. Sweetie Lindsey at [facility].          Medications Prior to Admission:    Prior to Admission medications    Medication Sig Start Date End Date Taking? Authorizing Provider   albuterol sulfate HFA (PROVENTIL HFA) 108 (90 Base) MCG/ACT inhaler Inhale 2 puffs into the lungs every 6 hours as needed for Wheezing 6/18/18  Yes Charla Taylor DO   atorvastatin (LIPITOR) 20 MG tablet Take 1 tablet by mouth daily 5/29/18  Yes Narinder Burris MD   PARoxetine (PAXIL) 20 MG tablet Take 1 tablet by mouth daily 5/29/18  Yes Narinder Burris MD   ipratropium-albuterol (DUONEB) 0.5-2.5 (3) MG/3ML SOLN nebulizer solution Inhale 3 mLs into the lungs every 6 hours as needed for Shortness of Breath 5/18/18  Yes Charla Taylor DO   tiotropium (SPIRIVA RESPIMAT) 2.5 MCG/ACT AERS inhaler Inhale 2 puffs into the lungs daily 5/17/18  Yes Charla Taylor DO   loratadine (CLARITIN) 10 MG tablet Take 1 tablet by mouth daily 5/15/18  Yes Charla Taylor DO   EPINEPHrine (EPIPEN 2-CAMILLE) 0.3 MG/0.3ML SOAJ injection Use as directed for allergic reaction 5/4/18  Yes Charla Taylor DO   acetaminophen (TYLENOL) 500 MG tablet Take 500 mg by mouth every 4 hours as needed for Pain   Yes Historical Provider, MD       Allergies   Allergen Reactions    Bee Venom     Pcn [Penicillins] Nausea And Vomiting       Family History   Problem Relation Age of Onset    Cancer Mother 37        CA in shoulder, lungs    High Blood Pressure Mother     Heart Disease Mother     Mental Illness Mother     High Cholesterol Mother     Arthritis Mother     Heart Attack Mother 43    Cancer Father 64        Throat CA    Substance Abuse Father         Alcohol, throat CA    High Blood Pressure Father     High Cholesterol Father     Diabetes Maternal Aunt        Social History   Substance Use Topics    Smoking status: Former Smoker     Packs/day: 1.00     Years: 36.00     Types: Cigarettes     Quit date: 5/11/2018    Smokeless tobacco: Never Used      Comment: pt started smoking again 4/16    Alcohol use No      Comment: Previous alcoholism, 24 years clean. Review of Systems   General ROS: negative for - chills or fever  Hematological and Lymphatic ROS: negative for - bleeding problems or blood clots  Respiratory ROS: negative for - cough or shortness of breath  Cardiovascular ROS: negative for - chest pain  Gastrointestinal ROS: as above  Musculoskeletal ROS: positive for - bilateral shoulder pain    PHYSICAL EXAM:    Vitals:    07/02/18 1319   BP: 114/77   Pulse: 74   Resp: 18   SpO2: 96%     Gen: alert, oriented, no apparent distress  HEENT: NCAT  CV: RR  Pulm: nonlabored breathing on room air, CTAB  Abdomen: soft, nontender, nondistended. No umbilical or groin hernias noted. Skin: warm and dry    LABS:  Lab Results   Component Value Date    WBC 6.8 05/13/2018    HGB 16.6 (H) 05/13/2018    HCT 50.0 (H) 05/13/2018    MCV 97.3 05/13/2018     05/13/2018     CT AP 6/2018 for left flank pain: nonobstructive left renal calculi; diverticulosis; small hepatic cyst    ASSESSMENT:  50 y.o. female who presents for low risk screening colonoscopy    PLAN:  Will schedule for colonoscopy. Clears day prior to procedure and colyte bowel prep  NPO after midnight.     Electronically signed by David Mccoy MD on 7/2/18 at 2:06 PM

## 2018-07-06 ENCOUNTER — OFFICE VISIT (OUTPATIENT)
Dept: FAMILY MEDICINE CLINIC | Age: 53
End: 2018-07-06
Payer: MEDICARE

## 2018-07-06 VITALS
RESPIRATION RATE: 18 BRPM | OXYGEN SATURATION: 96 % | BODY MASS INDEX: 34.32 KG/M2 | WEIGHT: 206 LBS | TEMPERATURE: 98.1 F | DIASTOLIC BLOOD PRESSURE: 73 MMHG | SYSTOLIC BLOOD PRESSURE: 109 MMHG | HEIGHT: 65 IN | HEART RATE: 75 BPM

## 2018-07-06 DIAGNOSIS — R06.02 SHORTNESS OF BREATH: Primary | ICD-10-CM

## 2018-07-06 PROCEDURE — G8427 DOCREV CUR MEDS BY ELIG CLIN: HCPCS | Performed by: STUDENT IN AN ORGANIZED HEALTH CARE EDUCATION/TRAINING PROGRAM

## 2018-07-06 PROCEDURE — 99213 OFFICE O/P EST LOW 20 MIN: CPT | Performed by: STUDENT IN AN ORGANIZED HEALTH CARE EDUCATION/TRAINING PROGRAM

## 2018-07-06 PROCEDURE — 1036F TOBACCO NON-USER: CPT | Performed by: STUDENT IN AN ORGANIZED HEALTH CARE EDUCATION/TRAINING PROGRAM

## 2018-07-06 PROCEDURE — G8599 NO ASA/ANTIPLAT THER USE RNG: HCPCS | Performed by: STUDENT IN AN ORGANIZED HEALTH CARE EDUCATION/TRAINING PROGRAM

## 2018-07-06 PROCEDURE — G8417 CALC BMI ABV UP PARAM F/U: HCPCS | Performed by: STUDENT IN AN ORGANIZED HEALTH CARE EDUCATION/TRAINING PROGRAM

## 2018-07-06 PROCEDURE — 3017F COLORECTAL CA SCREEN DOC REV: CPT | Performed by: STUDENT IN AN ORGANIZED HEALTH CARE EDUCATION/TRAINING PROGRAM

## 2018-07-06 PROCEDURE — 99212 OFFICE O/P EST SF 10 MIN: CPT | Performed by: STUDENT IN AN ORGANIZED HEALTH CARE EDUCATION/TRAINING PROGRAM

## 2018-07-06 ASSESSMENT — ENCOUNTER SYMPTOMS: ABDOMINAL PAIN: 0

## 2018-07-06 NOTE — PROGRESS NOTES
[address] [facility]  Department of Family Medicine  Family Medicine Residency Program      Patient:  Zenia De Santiago 50 y.o. female     Date of Service: 7/6/18      Chief complaint:   Chief Complaint   Patient presents with    COPD     3 week follow up          History of Present Illness   The patient is a 50 y.o. female  presented to the clinic for follow-up of COPD exacerbation. Patient states that her COPD is better since been started on Spiriva. She continues to use albuterol rescue inhaler about 5 times a week, as well as DuoNeb nebulizer, about once a week. She states her shortness of breath exacerbated by environmental allergens, and the heat outside. She continues to have pain in left lower lung field and she states that she has quit smoking, is coughing less than before, and denies having productive cough. Patient also had stress test order due to shortness of breath during her last visit to the clinic and she denies having any  chest pain, but does admit to intermittent chest pressure when active including climbing stairs with associated shortness of breath. Of note CVA tenderness is noted on physical exam, which is no change from her previous visit to the clinic. At that time urine studies were done and there were no signs of UTI, dysuria, or urgency. Past Medical History:      Diagnosis Date    Adhesive capsulitis of right shoulder     s/p rotator cuff injury and repair; follows with Dr. Ras Milan at [facility].  Alcoholism ([facility] [address]     Quit 24 years ago.  Allergic rhinosinusitis     Asthma     Rare use of albuterol.  Bilateral carpal tunnel syndrome 3/26/2018    COPD (chronic obstructive pulmonary disease) (HCC)     Hidradenitis suppurativa 4/17/2012    Hyperlipidemia     on statin    Hypertension     No longer on medications.       Intertrigo 4/17/2012    Moderate single current episode of major depressive disorder ([address] 3/26/2018    Onychomycosis 4/17/2012    Tobacco developed, awake, alert, oriented, no acute distress  HEENT: Normocephalic, atraumatic. Neck: Supple, symmetrical, trachea midline. .  Chest wall/Lung: Clear to auscultation bilaterally,  respirations unlabored. No ronchi/wheezing/rales  Heart: Regular rate and rhythm, S1 and S2 normal, no murmur, rub or gallop. Abdomen: Soft, non-tender, bowel sounds normoactive, no masses, . CVA tenderness noted on left side (unchanged from previous visits the clinic)  Extremities:   No edema. Neurologic:   Alert&Oriented. Assessment and Plan       1. Shortness of breath  -Likely secondary to COPD, and triggers. Recently went to hospital for COPD exacerbation. Some Chest pressure as well. - XR CHEST STANDARD (2 VW); Future  -Encouraged to get stress test done which was ordered previously    2 Chest Pressure   -states havind chest pressure with activity, and some SOB as well  -Encouraged  to get stress test done which was ordered previously    3. Left sided CVA tenderness  -no change from previous visits to the clinic, and no signs of UTI  -No need to get UA for now      Return to Office: Return in about 1 month (around 8/6/2018) for CXR, and Stress test f/u.       Medication List:    Current Outpatient Prescriptions   Medication Sig Dispense Refill    albuterol sulfate HFA (PROVENTIL HFA) 108 (90 Base) MCG/ACT inhaler Inhale 2 puffs into the lungs every 6 hours as needed for Wheezing 1 Inhaler 3    atorvastatin (LIPITOR) 20 MG tablet Take 1 tablet by mouth daily 90 tablet 3    PARoxetine (PAXIL) 20 MG tablet Take 1 tablet by mouth daily 30 tablet 3    ipratropium-albuterol (DUONEB) 0.5-2.5 (3) MG/3ML SOLN nebulizer solution Inhale 3 mLs into the lungs every 6 hours as needed for Shortness of Breath 360 mL 1    tiotropium (SPIRIVA RESPIMAT) 2.5 MCG/ACT AERS inhaler Inhale 2 puffs into the lungs daily 1 Inhaler 2    loratadine (CLARITIN) 10 MG tablet Take 1 tablet by mouth daily 30 tablet 5    EPINEPHrine (EPIPEN

## 2018-07-09 ENCOUNTER — OFFICE VISIT (OUTPATIENT)
Dept: SURGERY | Age: 53
End: 2018-07-09
Payer: MEDICARE

## 2018-07-09 VITALS
OXYGEN SATURATION: 98 % | WEIGHT: 206 LBS | HEART RATE: 82 BPM | SYSTOLIC BLOOD PRESSURE: 122 MMHG | BODY MASS INDEX: 34.32 KG/M2 | DIASTOLIC BLOOD PRESSURE: 78 MMHG | HEIGHT: 65 IN

## 2018-07-09 DIAGNOSIS — L98.9 SKIN LESION: Primary | ICD-10-CM

## 2018-07-09 PROCEDURE — G8599 NO ASA/ANTIPLAT THER USE RNG: HCPCS | Performed by: PHYSICIAN ASSISTANT

## 2018-07-09 PROCEDURE — G8417 CALC BMI ABV UP PARAM F/U: HCPCS | Performed by: PHYSICIAN ASSISTANT

## 2018-07-09 PROCEDURE — 99212 OFFICE O/P EST SF 10 MIN: CPT | Performed by: PHYSICIAN ASSISTANT

## 2018-07-09 PROCEDURE — 3017F COLORECTAL CA SCREEN DOC REV: CPT | Performed by: PHYSICIAN ASSISTANT

## 2018-07-09 PROCEDURE — 1036F TOBACCO NON-USER: CPT | Performed by: PHYSICIAN ASSISTANT

## 2018-07-09 PROCEDURE — G8427 DOCREV CUR MEDS BY ELIG CLIN: HCPCS | Performed by: PHYSICIAN ASSISTANT

## 2018-07-10 ENCOUNTER — OFFICE VISIT (OUTPATIENT)
Dept: FAMILY MEDICINE CLINIC | Age: 53
End: 2018-07-10
Payer: MEDICARE

## 2018-07-10 VITALS
SYSTOLIC BLOOD PRESSURE: 118 MMHG | DIASTOLIC BLOOD PRESSURE: 70 MMHG | HEIGHT: 65 IN | HEART RATE: 82 BPM | WEIGHT: 205 LBS | RESPIRATION RATE: 16 BRPM | BODY MASS INDEX: 34.16 KG/M2 | OXYGEN SATURATION: 96 % | TEMPERATURE: 98.3 F

## 2018-07-10 DIAGNOSIS — N95.0 POST-MENOPAUSAL BLEEDING: Primary | ICD-10-CM

## 2018-07-10 PROCEDURE — 99213 OFFICE O/P EST LOW 20 MIN: CPT | Performed by: FAMILY MEDICINE

## 2018-07-10 PROCEDURE — G8599 NO ASA/ANTIPLAT THER USE RNG: HCPCS | Performed by: FAMILY MEDICINE

## 2018-07-10 PROCEDURE — G8428 CUR MEDS NOT DOCUMENT: HCPCS | Performed by: FAMILY MEDICINE

## 2018-07-10 PROCEDURE — 1036F TOBACCO NON-USER: CPT | Performed by: FAMILY MEDICINE

## 2018-07-10 PROCEDURE — G8417 CALC BMI ABV UP PARAM F/U: HCPCS | Performed by: FAMILY MEDICINE

## 2018-07-10 PROCEDURE — 99212 OFFICE O/P EST SF 10 MIN: CPT | Performed by: FAMILY MEDICINE

## 2018-07-10 PROCEDURE — 3017F COLORECTAL CA SCREEN DOC REV: CPT | Performed by: FAMILY MEDICINE

## 2018-07-10 NOTE — PROGRESS NOTES
S: 50 y.o. female here for follow up of endometrial biopsy. Tolerated procedure well. No further vaginal bleeding. Results showed some atrophy biopsy had insufficient cells. O: VS: /70   Pulse 82   Temp 98.3 °F (36.8 °C) (Oral)   Resp 16   Ht 5' 5\" (1.651 m)   Wt 205 lb (93 kg)   LMP 09/13/2012   SpO2 96%   BMI 34.11 kg/m²    General: NAD   CV:  RRR, no gallops, rubs, or murmurs   Resp: CTAB no R/R/W   Abd:  Soft, nontender, no masses    Ext:  no C/C/E  Impression/Plan:   1. Postmenopausal bleeding-refer to gyne for evaluation. rto in the next few weeks      Attending Physician Statement  I have discussed the case, including pertinent history and exam findings with the resident. I agree with the documented assessment and plan.         Irving Foreman MD

## 2018-07-10 NOTE — PROGRESS NOTES
[facility]  FAMILY MEDICINE RESIDENCY PROGRAM   OFFICE PROGRESS NOTE  DATE OF VISIT : 2018    Patient : Malgorzata Grijalva   Sex : female   Age : 50 y.o.  : 1965   MRN : [id number]            History of Present Illness : Malgorzata Grijalva  50 y.o. who presented to the clinic today for Results (biopsy)    Patient informed of the biopsy results today. Questions answered. No more vaginal bleeding or discharge since procedure. No abdominal pain or fever or chills. Tolerated the procedure well. PMH/PSH, SH and FH were reviewed in the chart. Med list was reviewed and modified if needed. Review of Systems :    General- Denies fatigue, malaise or unintentional weight changes, fever or chills   HENT Denies headache,  visual disturbance  Chest- no chest pain, SOB    Heart- no pedal edema, no palpitations, chest pain, exertional dyspnea  Gastrointestinal - No diarrhea, nausea, constipation  - No dysuria  Ext- Denies weakness or paresthesias.          Physical Exam :    VITAL SIGNS : Blood pressure 118/70, pulse 82, temperature 98.3 °F (36.8 °C), temperature source Oral, resp. rate 16, height 5' 5\" (1.651 m), weight 205 lb (93 kg), last menstrual period 2012, SpO2 96 %, not currently breastfeeding. GENERAL APPEARANCE : NAD, cooperative, appears stated age  LUNGS : Respiration unlabored, clear sounds b/l  HEART : RRR, normal S1/S2, no murmur noted  EXTREMITIES : No peripheral edema, no clubbing, no cyanosis, peripheral pulses +2/4 BL in upper and lower extremities         Assessment & Plan :    Tio Ellison was seen today for results.     Diagnoses and all orders for this visit:    Post-menopausal bleeding  -     Hemalatha Gatica MD      Will discuss the case with Dr. Usha Jurado to seek further recommendations     RTO in   Future Appointments  Date Time Provider Nisa April   2018 2:20 PM Latisha Acton, MD Adam Dance [facility]   2018 1:00 PM Karen Rodriguez

## 2018-07-19 ENCOUNTER — OFFICE VISIT (OUTPATIENT)
Dept: FAMILY MEDICINE CLINIC | Age: 53
End: 2018-07-19
Payer: MEDICARE

## 2018-07-19 VITALS
RESPIRATION RATE: 18 BRPM | BODY MASS INDEX: 34.49 KG/M2 | HEART RATE: 82 BPM | TEMPERATURE: 98 F | DIASTOLIC BLOOD PRESSURE: 81 MMHG | SYSTOLIC BLOOD PRESSURE: 125 MMHG | WEIGHT: 207 LBS | HEIGHT: 65 IN | OXYGEN SATURATION: 95 %

## 2018-07-19 DIAGNOSIS — R93.89 THICKENED ENDOMETRIUM: Primary | ICD-10-CM

## 2018-07-19 PROCEDURE — 3017F COLORECTAL CA SCREEN DOC REV: CPT | Performed by: FAMILY MEDICINE

## 2018-07-19 PROCEDURE — G8427 DOCREV CUR MEDS BY ELIG CLIN: HCPCS | Performed by: FAMILY MEDICINE

## 2018-07-19 PROCEDURE — 1036F TOBACCO NON-USER: CPT | Performed by: FAMILY MEDICINE

## 2018-07-19 PROCEDURE — 99212 OFFICE O/P EST SF 10 MIN: CPT | Performed by: FAMILY MEDICINE

## 2018-07-19 PROCEDURE — 99213 OFFICE O/P EST LOW 20 MIN: CPT | Performed by: FAMILY MEDICINE

## 2018-07-19 PROCEDURE — G8599 NO ASA/ANTIPLAT THER USE RNG: HCPCS | Performed by: FAMILY MEDICINE

## 2018-07-19 PROCEDURE — G8417 CALC BMI ABV UP PARAM F/U: HCPCS | Performed by: FAMILY MEDICINE

## 2018-07-26 ASSESSMENT — ENCOUNTER SYMPTOMS
ABDOMINAL PAIN: 0
EYE REDNESS: 0
NAUSEA: 0
COUGH: 0
SHORTNESS OF BREATH: 0
DIARRHEA: 0

## 2018-07-26 NOTE — PROGRESS NOTES
Subjective:      Patient ID: Mateo Bunch is a 50 y.o. female. Patient is here for FU of endometrial bx results. Discussed with patient in presence of Dr. Rubio Mccall  No more sx at this point. No other symptoms either. Review of Systems   Constitutional: Negative for chills, fatigue and fever. Eyes: Negative for redness. Respiratory: Negative for cough and shortness of breath. Cardiovascular: Negative for chest pain and palpitations. Gastrointestinal: Negative for abdominal pain, diarrhea and nausea. Genitourinary: Negative for frequency. Objective:   Physical Exam   Constitutional: She appears well-developed and well-nourished. Neck: Normal range of motion. Pulmonary/Chest: Effort normal. No respiratory distress. She has no wheezes. Abdominal: She exhibits no distension. There is no tenderness. Assessment: Thickened endometrial stripe  Inadequate sample on endometrial biopsy, report of inactive endometrium      Plan:      Discussed the case with Dr. Rubio Mccall, old imaging reviewed. Repeat transvaginal US in 8 weeks. If that is 4 mm or less I would do nothing further than watch her. If thicker or of any concern could offer another endometrial biopsy or perhaps an outpatient D&C and Hysteroscopy.

## 2018-07-27 ENCOUNTER — HOSPITAL ENCOUNTER (OUTPATIENT)
Dept: ULTRASOUND IMAGING | Age: 53
Discharge: HOME | End: 2018-07-29
Payer: MEDICARE

## 2018-07-27 DIAGNOSIS — R93.89 THICKENED ENDOMETRIUM: ICD-10-CM

## 2018-07-27 PROCEDURE — 76830 TRANSVAGINAL US NON-OB: CPT

## 2018-07-31 ENCOUNTER — HOSPITAL ENCOUNTER (OUTPATIENT)
Age: 53
Discharge: HOME | End: 2018-08-02
Payer: MEDICARE

## 2018-07-31 DIAGNOSIS — J30.2 CHRONIC SEASONAL ALLERGIC RHINITIS, UNSPECIFIED TRIGGER: ICD-10-CM

## 2018-07-31 LAB — EOSINOPHILS ABSOLUTE COUNT: 300 /UL (ref 50–250)

## 2018-07-31 PROCEDURE — 86003 ALLG SPEC IGE CRUDE XTRC EA: CPT

## 2018-07-31 PROCEDURE — 82785 ASSAY OF IGE: CPT

## 2018-07-31 PROCEDURE — 85048 AUTOMATED LEUKOCYTE COUNT: CPT

## 2018-08-02 ENCOUNTER — PREP FOR PROCEDURE (OUTPATIENT)
Dept: SURGERY | Age: 53
End: 2018-08-02

## 2018-08-02 DIAGNOSIS — N95.0 PMB (POSTMENOPAUSAL BLEEDING): Primary | ICD-10-CM

## 2018-08-02 RX ORDER — SODIUM CHLORIDE 9 MG/ML
INJECTION, SOLUTION INTRAVENOUS CONTINUOUS
Status: CANCELLED | OUTPATIENT
Start: 2018-08-02 | End: 2019-08-02

## 2018-08-02 NOTE — OP NOTE
history    Electronically signed by Marcia Guidry MD on 8/2/18 at 9:45 AM    Yenny Matamoros M.D.      NOTE: This report was transcribed using voice recognition software.  Every effort was made to ensure accuracy; however, inadvertent computerized transcription errors may be present

## 2018-08-02 NOTE — H&P
GENERAL SURGERY   HISTORY AND PHYSICAL       Physician Consulted: Dr. Guillermo Mejia  Reason for Consult: screening colonoscopy  Referring Physician: Dr. Jina BETANCOURT  Sai Marcelo is a 50 y.o. female who presents for evaluation for screening colonoscopy. She denies any nausea, vomiting, diarrhea, constipation, hematochezia, or melena. No fevers, chills or unexpected weight loss. She has bowel movements about daily without problems. She does have longstanding history of intermittent heartburn about once or twice a month, with good symptom relief with TUMS. She is not interested in EGD.      PMH notable for depression and COPD.     PSH: umbilical hernia repair, multiple orthopedic surgeries. No prior EGD or colonoscopy     NKDA     Social history: previous smoker, 2-3 packs daily for 30 years. Denies current ETOH use. Denies illicit drug use.     No family history of colon cancer. Family history notable for father with esophageal cancer.     Past Medical History        Past Medical History:   Diagnosis Date    Adhesive capsulitis of right shoulder       s/p rotator cuff injury and repair; follows with Dr. Jt Willett at [facility].  Alcoholism ([facility] [address]       Quit 24 years ago.  Allergic rhinosinusitis      Asthma       Rare use of albuterol.  Bilateral carpal tunnel syndrome 3/26/2018    COPD (chronic obstructive pulmonary disease) (HCC)      Hidradenitis suppurativa 4/17/2012    Hyperlipidemia       on statin    Hypertension       No longer on medications.       Intertrigo 4/17/2012    Moderate single current episode of major depressive disorder ([facility] [address] 3/26/2018    Onychomycosis 4/17/2012    Tobacco abuse              Past Surgical History         Past Surgical History:   Procedure Laterality Date    ANKLE SURGERY        FRACTURE SURGERY   1994     Left Tibia    FRACTURE SURGERY         Multiple, different injuries    HERNIA REPAIR   3469     Umbilical hernia    ROTATOR CUFF REPAIR   10/23/2012      Cigarettes       Quit date: 5/11/2018    Smokeless tobacco: Never Used         Comment: pt started smoking again 4/16    Alcohol use No         Comment: Previous alcoholism, 24 years clean.              Review of Systems   General ROS: negative for - chills or fever  Hematological and Lymphatic ROS: negative for - bleeding problems or blood clots  Respiratory ROS: negative for - cough or shortness of breath  Cardiovascular ROS: negative for - chest pain  Gastrointestinal ROS: as above  Musculoskeletal ROS: positive for - bilateral shoulder pain     PHYSICAL EXAM:         Vitals:     07/02/18 1319   BP: 114/77   Pulse: 74   Resp: 18   SpO2: 96%      Gen: alert, oriented, no apparent distress  HEENT: NCAT  CV: RR  Pulm: nonlabored breathing on room air, CTAB  Abdomen: soft, nontender, nondistended. No umbilical or groin hernias noted. Skin: warm and dry     LABS:        Lab Results   Component Value Date     WBC 6.8 05/13/2018     HGB 16.6 (H) 05/13/2018     HCT 50.0 (H) 05/13/2018     MCV 97.3 05/13/2018      05/13/2018      CT AP 6/2018 for left flank pain: nonobstructive left renal calculi; diverticulosis; small hepatic cyst     ASSESSMENT:  50 y.o. female who presents for low risk screening colonoscopy. She has a negative personal family history of colon cancer     PLAN:  Colonoscopy as outpatient. The patient was explained the risks/benefits/alternatives/expected outcomes of the procedure. The patient was explained the risks of the procedure, including, but not limited to, the risk of reaction to the anesthesia medicine and the risk of perforation requiring further surgery. The patient was informed that they may require biopsy or polypectomy. These procedures may increase the risk of complication. All questions were answered. The patient verbalized understanding and agreed to proceed.

## 2018-08-06 LAB
Lab: NORMAL
REPORT: NORMAL
THIS TEST SENT TO: NORMAL

## 2018-08-07 ENCOUNTER — OFFICE VISIT (OUTPATIENT)
Dept: FAMILY MEDICINE CLINIC | Age: 53
End: 2018-08-07
Payer: MEDICARE

## 2018-08-07 VITALS
RESPIRATION RATE: 18 BRPM | OXYGEN SATURATION: 96 % | WEIGHT: 211 LBS | BODY MASS INDEX: 35.16 KG/M2 | DIASTOLIC BLOOD PRESSURE: 77 MMHG | TEMPERATURE: 98 F | HEART RATE: 78 BPM | SYSTOLIC BLOOD PRESSURE: 112 MMHG | HEIGHT: 65 IN

## 2018-08-07 DIAGNOSIS — N20.0 RENAL CALCULUS, LEFT: ICD-10-CM

## 2018-08-07 DIAGNOSIS — J44.9 CHRONIC OBSTRUCTIVE PULMONARY DISEASE, UNSPECIFIED COPD TYPE (HCC): ICD-10-CM

## 2018-08-07 DIAGNOSIS — R06.02 SHORTNESS OF BREATH: ICD-10-CM

## 2018-08-07 DIAGNOSIS — R10.9 LEFT FLANK PAIN: ICD-10-CM

## 2018-08-07 PROCEDURE — 1036F TOBACCO NON-USER: CPT | Performed by: STUDENT IN AN ORGANIZED HEALTH CARE EDUCATION/TRAINING PROGRAM

## 2018-08-07 PROCEDURE — G8599 NO ASA/ANTIPLAT THER USE RNG: HCPCS | Performed by: STUDENT IN AN ORGANIZED HEALTH CARE EDUCATION/TRAINING PROGRAM

## 2018-08-07 PROCEDURE — G8926 SPIRO NO PERF OR DOC: HCPCS | Performed by: STUDENT IN AN ORGANIZED HEALTH CARE EDUCATION/TRAINING PROGRAM

## 2018-08-07 PROCEDURE — 99213 OFFICE O/P EST LOW 20 MIN: CPT | Performed by: STUDENT IN AN ORGANIZED HEALTH CARE EDUCATION/TRAINING PROGRAM

## 2018-08-07 PROCEDURE — 3023F SPIROM DOC REV: CPT | Performed by: STUDENT IN AN ORGANIZED HEALTH CARE EDUCATION/TRAINING PROGRAM

## 2018-08-07 PROCEDURE — G8427 DOCREV CUR MEDS BY ELIG CLIN: HCPCS | Performed by: STUDENT IN AN ORGANIZED HEALTH CARE EDUCATION/TRAINING PROGRAM

## 2018-08-07 PROCEDURE — 3017F COLORECTAL CA SCREEN DOC REV: CPT | Performed by: STUDENT IN AN ORGANIZED HEALTH CARE EDUCATION/TRAINING PROGRAM

## 2018-08-07 PROCEDURE — G8417 CALC BMI ABV UP PARAM F/U: HCPCS | Performed by: STUDENT IN AN ORGANIZED HEALTH CARE EDUCATION/TRAINING PROGRAM

## 2018-08-07 NOTE — PROGRESS NOTES
remainder of the day due to having had anesthesia. PARKING INSTRUCTIONS:   · [x] Arrival Time 1200  · [x] Parking lot 1 is located on [facility] (the corner of [facility] and [facility]). To enter, press the button and the gate will lift. A free token will be provided to exit the lot. One car per patient is allowed to park in this lot. All other cars are to park on [address] [place] [address] Street either in the parking garage or the handicap lot. [] Free  parking is available on [address] [place] [address]. · [] To reach the [facility] lobby from [address] [place] [address], upon entering the hospital, take elevator B to the 3rd floor. EDUCATION INSTRUCTIONS:      [] Knee or hip replacement booklet & exercise pamphlets given. [] Prasanth Bishop placed in chart. [] Pre-admission Testing educational folder given  [] Incentive Spirometry,coughing & deep breathing exercises reviewed. []Medication information sheet(s)   []Fluoroscopy-Xray used in surgery reviewed with patient. Educational pamphlet placed in chart. []Pain: Post-op pain is normal and to be expected. You will be asked to rate your pain from 0-10(a zero is not acceptable-education is needed). Your post-op pain goal is:  [] Ask your nurse for your pain medication. [] Joint camp offered. [] Joint replacement booklets given. []Other     MEDICATION INSTRUCTIONS:   [x]Bring a complete list of your medications, please write the last time you took the medicine, give this list to the nurse. [x] Take the following medications the morning of surgery with 1-2 ounces of water: see list    [] Stop herbal supplements and vitamins 5 days before your surgery. [] DO NOT take any diabetic medicine the morning of surgery. Follow instructions for insulin the day before surgery. [] If you are diabetic and your blood sugar is low or you feel symptomatic, you may drink 1-2 ounces of apple juice or take a glucose tablet.   The morning of your procedure,

## 2018-08-07 NOTE — LETTER
[facility]  [address]  Phone: [phone]  Fax: [phone]    Hamzah Reilly MD        August 7, 2018     Patient: Esvin Brooks   YOB: 1965   Date of Visit: 8/7/2018       To Whom It May Concern: It is my medical opinion that Chloe Mejía should remain out of work until 11/10/2018. If you have any questions or concerns, please don't hesitate to call.     Sincerely,        Hamzah Reilly MD

## 2018-08-07 NOTE — PROGRESS NOTES
Chief Complaint: Follow up COPD exacerbation, left sided back pain. Interval History: Patient seen by Dr. Chance Oneal for her COPD about 1 week ago, was started on Trelegy inhaler. 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 Even when inside, patient complains of shortness of breath when walking even very mild distances. Uses her albuterol once or twice daily. Was told by Dr. Chance Oneal that she may not continue to work at her current job due to her breathing difficulties, but did not provide her documentation to that effect; patient wishes to obtain such today. Patient has noted left sided back pain for past several weeks. Had CT scan earlier this year which showed non-obstructing renal calculus on the left. No history of trauma or strain. No radiation. Denies urinary symptoms. Past Medical History:   Diagnosis Date    Adhesive capsulitis of right shoulder     s/p rotator cuff injury and repair; follows with Dr. Lazara Goodrich at [facility].  Alcoholism ([facility] [address]     Quit 24 years ago.  Allergic rhinosinusitis     Asthma     Rare use of albuterol.  Bilateral carpal tunnel syndrome 3/26/2018    COPD (chronic obstructive pulmonary disease) (HCC)     Hidradenitis suppurativa 4/17/2012    Hyperlipidemia     on statin    Hypertension     No longer on medications.  Intertrigo 4/17/2012    Moderate single current episode of major depressive disorder ([facility] [address] 3/26/2018    Onychomycosis 4/17/2012    Tobacco abuse        Past Surgical History:   Procedure Laterality Date    ANKLE SURGERY      FRACTURE SURGERY  1994    Left Tibia    FRACTURE SURGERY      Multiple, different injuries    HERNIA REPAIR  5179    Umbilical hernia    ROTATOR CUFF REPAIR  10/23/2012    Dr. Lazara Goodrich at [facility].          Family History   Problem Relation Age of Onset    Cancer Mother 37        CA in shoulder, lungs    High Blood Pressure Mother     Heart Disease Mother     Mental Illness Mother     High Cholesterol Mother     Arthritis Mother     Heart Attack Mother 43    Cancer Father 64        Throat CA    Substance Abuse Father         Alcohol, throat CA    High Blood Pressure Father     High Cholesterol Father     Diabetes Maternal Aunt        Social History     Social History    Marital status: Legally      Spouse name: N/A    Number of children: N/A    Years of education: N/A     Occupational History           Social History Main Topics    Smoking status: Former Smoker     Packs/day: 1.00     Years: 36.00     Types: Cigarettes     Quit date: 5/11/2018    Smokeless tobacco: Never Used    Alcohol use No      Comment: Previous alcoholism, 24 years clean.       Drug use: No    Sexual activity: Yes     Partners: Male     Other Topics Concern    Not on file     Social History Narrative    No narrative on file       Current Outpatient Prescriptions   Medication Sig Dispense Refill    Fluticasone-Umeclidin-Vilant (TRELEGY ELLIPTA) 100-62.5-25 MCG/INH AEPB Inhale 1 Dose into the lungs daily (Patient taking differently: Inhale 1 Dose into the lungs nightly ) 1 each 5    albuterol sulfate HFA (VENTOLIN HFA) 108 (90 Base) MCG/ACT inhaler Inhale 2 puffs into the lungs every 6 hours as needed for Wheezing (Patient taking differently: Inhale 2 puffs into the lungs every 6 hours as needed for Wheezing Bring day of surgery) 1 Inhaler 3    albuterol sulfate HFA (PROVENTIL HFA) 108 (90 Base) MCG/ACT inhaler Inhale 2 puffs into the lungs every 6 hours as needed for Wheezing 1 Inhaler 3    atorvastatin (LIPITOR) 20 MG tablet Take 1 tablet by mouth daily (Patient taking differently: Take 20 mg by mouth nightly ) 90 tablet 3    PARoxetine (PAXIL) 20 MG tablet Take 1 tablet by mouth daily (Patient taking differently: Take 20 mg by mouth nightly

## 2018-08-10 ENCOUNTER — OFFICE VISIT (OUTPATIENT)
Dept: OBGYN | Age: 53
End: 2018-08-10
Payer: MEDICARE

## 2018-08-10 VITALS
DIASTOLIC BLOOD PRESSURE: 76 MMHG | WEIGHT: 209 LBS | BODY MASS INDEX: 34.82 KG/M2 | TEMPERATURE: 98.3 F | SYSTOLIC BLOOD PRESSURE: 124 MMHG | HEIGHT: 65 IN | HEART RATE: 79 BPM

## 2018-08-10 DIAGNOSIS — N95.0 PMB (POSTMENOPAUSAL BLEEDING): Primary | ICD-10-CM

## 2018-08-10 PROCEDURE — G8427 DOCREV CUR MEDS BY ELIG CLIN: HCPCS | Performed by: OBSTETRICS & GYNECOLOGY

## 2018-08-10 PROCEDURE — 99212 OFFICE O/P EST SF 10 MIN: CPT | Performed by: OBSTETRICS & GYNECOLOGY

## 2018-08-10 PROCEDURE — G8599 NO ASA/ANTIPLAT THER USE RNG: HCPCS | Performed by: OBSTETRICS & GYNECOLOGY

## 2018-08-10 PROCEDURE — G8417 CALC BMI ABV UP PARAM F/U: HCPCS | Performed by: OBSTETRICS & GYNECOLOGY

## 2018-08-10 PROCEDURE — 1036F TOBACCO NON-USER: CPT | Performed by: OBSTETRICS & GYNECOLOGY

## 2018-08-10 PROCEDURE — 3017F COLORECTAL CA SCREEN DOC REV: CPT | Performed by: OBSTETRICS & GYNECOLOGY

## 2018-08-10 NOTE — PROGRESS NOTES
Patient known to me regarding PMPB. Repeat sonogram does show a thickened endometrial lining and I would think we should proceed with and outpatient Hysteroscopy and D&C. Had a biopsy at [facility] which showed some atrophic endometrium but was not diagnostic on either specimen. Getting colonoscopy next week. Risks discussed and booklets given to the patient. Biggest risks is perforation. All questions answered. See pre-op.

## 2018-08-11 ASSESSMENT — ENCOUNTER SYMPTOMS
SHORTNESS OF BREATH: 1
VOMITING: 0
COUGH: 1
NAUSEA: 0
BACK PAIN: 1
ABDOMINAL PAIN: 0

## 2018-08-13 ENCOUNTER — TELEPHONE (OUTPATIENT)
Dept: FAMILY MEDICINE CLINIC | Age: 53
End: 2018-08-13

## 2018-08-13 NOTE — TELEPHONE ENCOUNTER
Pt calling stating that she is doing prep for colonoscopy and is not feeling well at all.  MA instructed her to call Dr. Shell Hodges who is doing the colonoscopy

## 2018-08-14 ENCOUNTER — ANESTHESIA (OUTPATIENT)
Dept: ENDOSCOPY | Age: 53
End: 2018-08-14
Payer: MEDICARE

## 2018-08-14 ENCOUNTER — HOSPITAL ENCOUNTER (OUTPATIENT)
Age: 53
Setting detail: OUTPATIENT SURGERY
Discharge: HOME | End: 2018-08-14
Attending: SURGERY | Admitting: SURGERY
Payer: MEDICARE

## 2018-08-14 ENCOUNTER — ANESTHESIA EVENT (OUTPATIENT)
Dept: ENDOSCOPY | Age: 53
End: 2018-08-14
Payer: MEDICARE

## 2018-08-14 VITALS
RESPIRATION RATE: 15 BRPM | HEART RATE: 67 BPM | TEMPERATURE: 97.3 F | OXYGEN SATURATION: 98 % | HEIGHT: 65 IN | WEIGHT: 207 LBS | SYSTOLIC BLOOD PRESSURE: 118 MMHG | DIASTOLIC BLOOD PRESSURE: 56 MMHG | BODY MASS INDEX: 34.49 KG/M2

## 2018-08-14 VITALS
OXYGEN SATURATION: 98 % | DIASTOLIC BLOOD PRESSURE: 61 MMHG | SYSTOLIC BLOOD PRESSURE: 107 MMHG | RESPIRATION RATE: 15 BRPM

## 2018-08-14 PROCEDURE — 6360000002 HC RX W HCPCS

## 2018-08-14 PROCEDURE — 3609009500 HC COLONOSCOPY DIAGNOSTIC OR SCREENING: Performed by: SURGERY

## 2018-08-14 PROCEDURE — 7100000010 HC PHASE II RECOVERY - FIRST 15 MIN: Performed by: SURGERY

## 2018-08-14 PROCEDURE — 3700000000 HC ANESTHESIA ATTENDED CARE: Performed by: SURGERY

## 2018-08-14 PROCEDURE — 2580000003 HC RX 258: Performed by: SURGERY

## 2018-08-14 PROCEDURE — 45378 DIAGNOSTIC COLONOSCOPY: CPT | Performed by: SURGERY

## 2018-08-14 PROCEDURE — 7100000011 HC PHASE II RECOVERY - ADDTL 15 MIN: Performed by: SURGERY

## 2018-08-14 PROCEDURE — 3700000001 HC ADD 15 MINUTES (ANESTHESIA): Performed by: SURGERY

## 2018-08-14 RX ORDER — MIDAZOLAM HYDROCHLORIDE 1 MG/ML
INJECTION INTRAMUSCULAR; INTRAVENOUS PRN
Status: DISCONTINUED | OUTPATIENT
Start: 2018-08-14 | End: 2018-08-14 | Stop reason: SDUPTHER

## 2018-08-14 RX ORDER — FENTANYL CITRATE 50 UG/ML
INJECTION, SOLUTION INTRAMUSCULAR; INTRAVENOUS PRN
Status: DISCONTINUED | OUTPATIENT
Start: 2018-08-14 | End: 2018-08-14 | Stop reason: SDUPTHER

## 2018-08-14 RX ORDER — PROPOFOL 10 MG/ML
INJECTION, EMULSION INTRAVENOUS PRN
Status: DISCONTINUED | OUTPATIENT
Start: 2018-08-14 | End: 2018-08-14 | Stop reason: SDUPTHER

## 2018-08-14 RX ORDER — 0.9 % SODIUM CHLORIDE 0.9 %
10 VIAL (ML) INJECTION EVERY 12 HOURS SCHEDULED
Status: DISCONTINUED | OUTPATIENT
Start: 2018-08-14 | End: 2018-08-14 | Stop reason: HOSPADM

## 2018-08-14 RX ORDER — 0.9 % SODIUM CHLORIDE 0.9 %
10 VIAL (ML) INJECTION PRN
Status: DISCONTINUED | OUTPATIENT
Start: 2018-08-14 | End: 2018-08-14 | Stop reason: HOSPADM

## 2018-08-14 RX ORDER — SODIUM CHLORIDE 9 MG/ML
INJECTION, SOLUTION INTRAVENOUS CONTINUOUS
Status: DISCONTINUED | OUTPATIENT
Start: 2018-08-14 | End: 2018-08-14 | Stop reason: HOSPADM

## 2018-08-14 RX ADMIN — PROPOFOL 20 MG: 10 INJECTION, EMULSION INTRAVENOUS at 12:19

## 2018-08-14 RX ADMIN — PROPOFOL 50 MG: 10 INJECTION, EMULSION INTRAVENOUS at 12:15

## 2018-08-14 RX ADMIN — PROPOFOL 50 MG: 10 INJECTION, EMULSION INTRAVENOUS at 12:10

## 2018-08-14 RX ADMIN — PROPOFOL 100 MG: 10 INJECTION, EMULSION INTRAVENOUS at 12:01

## 2018-08-14 RX ADMIN — FENTANYL CITRATE 50 MCG: 50 INJECTION, SOLUTION INTRAMUSCULAR; INTRAVENOUS at 11:58

## 2018-08-14 RX ADMIN — MIDAZOLAM HYDROCHLORIDE 2 MG: 1 INJECTION, SOLUTION INTRAMUSCULAR; INTRAVENOUS at 11:54

## 2018-08-14 RX ADMIN — FENTANYL CITRATE 50 MCG: 50 INJECTION, SOLUTION INTRAMUSCULAR; INTRAVENOUS at 12:05

## 2018-08-14 RX ADMIN — SODIUM CHLORIDE: 9 INJECTION, SOLUTION INTRAVENOUS at 11:41

## 2018-08-14 RX ADMIN — PROPOFOL 30 MG: 10 INJECTION, EMULSION INTRAVENOUS at 12:21

## 2018-08-14 RX ADMIN — PROPOFOL 50 MG: 10 INJECTION, EMULSION INTRAVENOUS at 12:05

## 2018-08-14 ASSESSMENT — COPD QUESTIONNAIRES: CAT_SEVERITY: NO INTERVAL CHANGE

## 2018-08-14 ASSESSMENT — PAIN - FUNCTIONAL ASSESSMENT: PAIN_FUNCTIONAL_ASSESSMENT: 0-10

## 2018-08-14 ASSESSMENT — PAIN SCALES - GENERAL
PAINLEVEL_OUTOF10: 0

## 2018-08-14 NOTE — INTERVAL H&P NOTE
I have examined the patient and reviewed the history and physical  and find no relevant changes. Heart -- RRR  Lungs clear bilaterally  Mental status--awake/alert    I have reviewed with the patient and/or family the risks, benefits, and alternatives to the procedure and they have agreed to proceed.     Rudi Brandt

## 2018-08-14 NOTE — ANESTHESIA PRE PROCEDURE
Pain    Historical Provider, MD       Current medications:    Current Facility-Administered Medications   Medication Dose Route Frequency Provider Last Rate Last Dose    0.9 % sodium chloride infusion   Intravenous Continuous Guerline Lepe MD        sodium chloride (PF) 0.9 % injection 10 mL  10 mL Intravenous 2 times per day Guerline Lepe MD        sodium chloride (PF) 0.9 % injection 10 mL  10 mL Intravenous PRN Guerline Lepe MD           Allergies: Allergies   Allergen Reactions    Bee Venom     Pcn [Penicillins] Nausea And Vomiting       Problem List:    Patient Active Problem List   Diagnosis Code    Adhesive capsulitis of right shoulder M75.01    Hypertension I10    Hyperlipidemia E78.5    Tobacco abuse Z72.0    COPD (ch[facility] obstructive pulmonary disease) ([facility]) J44.9    Allergic rhinosinusitis J30.9    Hidradenitis suppurativa L73.2    Intertrigo L30.4    Onychomycosis B35.1    Right shoulder pain M25.511    Biceps tendonitis of both shoulders M75.21, M75.22    Bilateral carpal tunnel syndrome G56.03    Moderate single current episode of major depressive disorder ([facility]) F32.1    Post-traumatic arthritis of left ankle M19.172    Bee sting allergy Z91.038    Pulmonary nodules R91.8       Past Medical History:        Diagnosis Date    Adhesive capsulitis of right shoulder     s/p rotator cuff injury and repair; [facility] with Dr. La Adams at [facility].  Alcoholism ([facility] [address]     Quit 24 years ago.  Allergic rhinosinusitis     Asthma     Rare use of albuterol.  Bilateral carpal tunnel syndrome 3/26/2018    COPD (chronic obstructive pulmonary disease) ([facility])     Hidradenitis suppurativa 4/17/2012    Hyperlipidemia     on statin    Hypertension     No longer on medications.       Intertrigo 4/17/2012    Moderate single current episode of major depressive disorder ([facility] [address] 3/26/2018    Onychomycosis 4/17/2012    Tobacco abuse        Past Surgical History:        Procedure

## 2018-08-27 ENCOUNTER — HOSPITAL ENCOUNTER (OUTPATIENT)
Dept: NUCLEAR MEDICINE | Age: 53
Discharge: HOME | End: 2018-08-29
Payer: MEDICARE

## 2018-08-27 ENCOUNTER — HOSPITAL ENCOUNTER (OUTPATIENT)
Dept: NON INVASIVE DIAGNOSTICS | Age: 53
Discharge: HOME | End: 2018-08-27
Payer: MEDICARE

## 2018-08-27 DIAGNOSIS — R06.02 SHORTNESS OF BREATH: ICD-10-CM

## 2018-08-27 LAB
LV EF: 65 %
LVEF MODALITY: NORMAL

## 2018-08-27 PROCEDURE — 93017 CV STRESS TEST TRACING ONLY: CPT

## 2018-08-27 PROCEDURE — A9500 TC99M SESTAMIBI: HCPCS | Performed by: RADIOLOGY

## 2018-08-27 PROCEDURE — 93016 CV STRESS TEST SUPVJ ONLY: CPT | Performed by: INTERNAL MEDICINE

## 2018-08-27 PROCEDURE — 93018 CV STRESS TEST I&R ONLY: CPT | Performed by: INTERNAL MEDICINE

## 2018-08-27 PROCEDURE — 6360000002 HC RX W HCPCS: Performed by: STUDENT IN AN ORGANIZED HEALTH CARE EDUCATION/TRAINING PROGRAM

## 2018-08-27 PROCEDURE — 3430000000 HC RX DIAGNOSTIC RADIOPHARMACEUTICAL: Performed by: RADIOLOGY

## 2018-08-27 PROCEDURE — 78452 HT MUSCLE IMAGE SPECT MULT: CPT

## 2018-08-27 RX ADMIN — REGADENOSON 0.4 MG: 0.08 INJECTION, SOLUTION INTRAVENOUS at 11:38

## 2018-08-27 RX ADMIN — Medication 31.2 MILLICURIE: at 11:44

## 2018-08-27 RX ADMIN — Medication 10 MILLICURIE: at 11:34

## 2018-08-31 NOTE — PROGRESS NOTES
procedure, you may call the pre-op area if you have concerns about your blood sugar [phone]. [x] Use your inhalers the morning of surgery. Bring your emergency inhaler with you day of surgery. [] Follow physician instructions regarding any blood thinners you may be taking. WHAT TO EXPECT:  [x] The day of surgery you will be greeted and  checked in by the [employer] .  A nurse will greet you in accordance to the time you are needed in the pre-op area to prepare you for surgery. Please do not be discouraged if you are not greeted in the order you arrive as there are many variables that are involved in patient preparation. Your patience is greatly appreciated as you wait for your nurse. Please bring in items such as: books, magazines, newspapers, electronics, or any other items  to occupy your time in the waiting area. [x]  Delays may occur with surgery and staff will make a sincere effort to keep you informed of delays. If any delays occur with your procedure, we apologize ahead of time for your inconvenience as we recognize the value of your time.

## 2018-09-04 ENCOUNTER — OFFICE VISIT (OUTPATIENT)
Dept: OBGYN | Age: 53
End: 2018-09-04
Payer: MEDICARE

## 2018-09-04 VITALS
SYSTOLIC BLOOD PRESSURE: 120 MMHG | HEIGHT: 65 IN | WEIGHT: 210 LBS | TEMPERATURE: 98 F | BODY MASS INDEX: 34.99 KG/M2 | DIASTOLIC BLOOD PRESSURE: 76 MMHG | RESPIRATION RATE: 16 BRPM | HEART RATE: 83 BPM

## 2018-09-04 DIAGNOSIS — N95.0 PMB (POSTMENOPAUSAL BLEEDING): Primary | ICD-10-CM

## 2018-09-04 PROCEDURE — G8417 CALC BMI ABV UP PARAM F/U: HCPCS | Performed by: OBSTETRICS & GYNECOLOGY

## 2018-09-04 PROCEDURE — 1036F TOBACCO NON-USER: CPT | Performed by: OBSTETRICS & GYNECOLOGY

## 2018-09-04 PROCEDURE — 99212 OFFICE O/P EST SF 10 MIN: CPT | Performed by: OBSTETRICS & GYNECOLOGY

## 2018-09-04 PROCEDURE — 3017F COLORECTAL CA SCREEN DOC REV: CPT | Performed by: OBSTETRICS & GYNECOLOGY

## 2018-09-04 PROCEDURE — G8427 DOCREV CUR MEDS BY ELIG CLIN: HCPCS | Performed by: OBSTETRICS & GYNECOLOGY

## 2018-09-04 PROCEDURE — G8599 NO ASA/ANTIPLAT THER USE RNG: HCPCS | Performed by: OBSTETRICS & GYNECOLOGY

## 2018-09-04 NOTE — PROGRESS NOTES
Here now for pre-op visit. For [facility] next Tuesday AM.  Procedure reviewed with the patient at length. Risks, luther uterine perforation reviewed. All questions answered. Upper PE is WNL all regions, systems and areas. Proceed with [facility] as scheduled next Tuesday. Post op visit scheduled.

## 2018-09-06 ENCOUNTER — PREP FOR PROCEDURE (OUTPATIENT)
Dept: FAMILY MEDICINE CLINIC | Age: 53
End: 2018-09-06

## 2018-09-06 DIAGNOSIS — N95.0 PMB (POSTMENOPAUSAL BLEEDING): Primary | ICD-10-CM

## 2018-09-06 RX ORDER — SODIUM CHLORIDE 0.9 % (FLUSH) 0.9 %
10 SYRINGE (ML) INJECTION EVERY 12 HOURS SCHEDULED
Status: CANCELLED | OUTPATIENT
Start: 2018-09-06 | End: 2019-09-06

## 2018-09-06 RX ORDER — SODIUM CHLORIDE, SODIUM LACTATE, POTASSIUM CHLORIDE, CALCIUM CHLORIDE 600; 310; 30; 20 MG/100ML; MG/100ML; MG/100ML; MG/100ML
INJECTION, SOLUTION INTRAVENOUS CONTINUOUS
Status: CANCELLED | OUTPATIENT
Start: 2018-09-06 | End: 2019-09-06

## 2018-09-06 RX ORDER — SODIUM CHLORIDE 0.9 % (FLUSH) 0.9 %
10 SYRINGE (ML) INJECTION PRN
Status: CANCELLED | OUTPATIENT
Start: 2018-09-06 | End: 2019-09-06

## 2018-09-07 NOTE — H&P
[address]                   [address] [facility],  [facility]                               HISTORY AND PHYSICAL    PATIENT NAME: Jazz Colin                     :        1965  MED REC NO:   [id number]                            ROOM:  ACCOUNT NO:   [de-identified]                           ADMIT DATE: 2018  PROVIDER:     Vanita Hartman MD    This is for history and physical for outpatient surgery on 2018. CHIEF COMPLAINT:  Postmenopausal bleeding. HISTORY OF PRESENT ILLNESS:  The patient is a 61-year-old G2 A1 female with  some postmenopausal bleeding. She was seen in family practice and had an  endometrial biopsy performed which showed some atrophic endometrium, but  pathology felt this was not fully diagnostic and so, at this time she  enters for hysteroscopy, dilatation and curettage as an outpatient for her  postmenopausal bleeding for complete diagnosis. The patient has been fully  counseled on this procedure, has read the booklets, understands the risks  including bleeding and uterine perforation and enters freely for same. REVIEW OF SYSTEMS:  Negative. PAST MEDICAL HISTORY:  She is allergic to PENICILLIN and BEE VENOM. She  has a past history of hypertension, hyperlipidemia and COPD. PAST SURGICAL HISTORY:  Include rotator cuff, hernia surgery, ankle surgery  and fracture repairs. PHYSICAL EXAMINATION:  GENERAL:  A well-developed, well-nourished female in no acute distress. HEENT:  Clear. LUNGS:  Clear to auscultation and percussion. HEART:  Regular rhythm without gallops or murmurs. ABDOMEN:  Soft without masses. PELVIC:  Uterus is midline, approximately top normal size. Adnexa clear. IMPRESSION:  Postmenopausal bleeding and she does have a thickened  endometrial lining.     PLAN:  The patient is admitted as an outpatient for hysteroscopy,  dilatation and curettage in the a.m. of 09/11/2018.         Bo Dsouza MD    D: 09/06/2018 13:12:13       T: 09/06/2018 13:13:37     [email or web address]  Job#: [id number]     Doc#: [id number]

## 2018-09-10 ENCOUNTER — OFFICE VISIT (OUTPATIENT)
Dept: FAMILY MEDICINE CLINIC | Age: 53
End: 2018-09-10
Payer: MEDICARE

## 2018-09-10 VITALS
HEIGHT: 65 IN | SYSTOLIC BLOOD PRESSURE: 125 MMHG | WEIGHT: 216 LBS | OXYGEN SATURATION: 97 % | DIASTOLIC BLOOD PRESSURE: 83 MMHG | HEART RATE: 91 BPM | BODY MASS INDEX: 35.99 KG/M2 | TEMPERATURE: 98.3 F | RESPIRATION RATE: 16 BRPM

## 2018-09-10 DIAGNOSIS — J44.1 COPD EXACERBATION (HCC): ICD-10-CM

## 2018-09-10 DIAGNOSIS — F32.1 MODERATE SINGLE CURRENT EPISODE OF MAJOR DEPRESSIVE DISORDER (HCC): ICD-10-CM

## 2018-09-10 PROCEDURE — 99213 OFFICE O/P EST LOW 20 MIN: CPT | Performed by: STUDENT IN AN ORGANIZED HEALTH CARE EDUCATION/TRAINING PROGRAM

## 2018-09-10 PROCEDURE — 3017F COLORECTAL CA SCREEN DOC REV: CPT | Performed by: STUDENT IN AN ORGANIZED HEALTH CARE EDUCATION/TRAINING PROGRAM

## 2018-09-10 PROCEDURE — G8926 SPIRO NO PERF OR DOC: HCPCS | Performed by: STUDENT IN AN ORGANIZED HEALTH CARE EDUCATION/TRAINING PROGRAM

## 2018-09-10 PROCEDURE — G8427 DOCREV CUR MEDS BY ELIG CLIN: HCPCS | Performed by: STUDENT IN AN ORGANIZED HEALTH CARE EDUCATION/TRAINING PROGRAM

## 2018-09-10 PROCEDURE — 99212 OFFICE O/P EST SF 10 MIN: CPT | Performed by: STUDENT IN AN ORGANIZED HEALTH CARE EDUCATION/TRAINING PROGRAM

## 2018-09-10 PROCEDURE — 1036F TOBACCO NON-USER: CPT | Performed by: STUDENT IN AN ORGANIZED HEALTH CARE EDUCATION/TRAINING PROGRAM

## 2018-09-10 PROCEDURE — G8599 NO ASA/ANTIPLAT THER USE RNG: HCPCS | Performed by: STUDENT IN AN ORGANIZED HEALTH CARE EDUCATION/TRAINING PROGRAM

## 2018-09-10 PROCEDURE — G8417 CALC BMI ABV UP PARAM F/U: HCPCS | Performed by: STUDENT IN AN ORGANIZED HEALTH CARE EDUCATION/TRAINING PROGRAM

## 2018-09-10 PROCEDURE — 3023F SPIROM DOC REV: CPT | Performed by: STUDENT IN AN ORGANIZED HEALTH CARE EDUCATION/TRAINING PROGRAM

## 2018-09-10 RX ORDER — PAROXETINE HYDROCHLORIDE 20 MG/1
40 TABLET, FILM COATED ORAL DAILY
Qty: 30 TABLET | Refills: 3 | Status: SHIPPED | OUTPATIENT
Start: 2018-09-10 | End: 2018-10-11 | Stop reason: SINTOL

## 2018-09-10 RX ORDER — PREDNISONE 20 MG/1
40 TABLET ORAL DAILY
Qty: 10 TABLET | Refills: 0 | Status: SHIPPED | OUTPATIENT
Start: 2018-09-10 | End: 2018-09-15

## 2018-09-11 ENCOUNTER — ANESTHESIA (OUTPATIENT)
Dept: OPERATING ROOM | Age: 53
End: 2018-09-11
Payer: MEDICARE

## 2018-09-11 ENCOUNTER — ANESTHESIA EVENT (OUTPATIENT)
Dept: OPERATING ROOM | Age: 53
End: 2018-09-11
Payer: MEDICARE

## 2018-09-11 ENCOUNTER — HOSPITAL ENCOUNTER (OUTPATIENT)
Age: 53
Setting detail: OUTPATIENT SURGERY
Discharge: HOME | End: 2018-09-11
Attending: OBSTETRICS & GYNECOLOGY | Admitting: OBSTETRICS & GYNECOLOGY
Payer: MEDICARE

## 2018-09-11 VITALS — TEMPERATURE: 95.5 F | DIASTOLIC BLOOD PRESSURE: 79 MMHG | OXYGEN SATURATION: 100 % | SYSTOLIC BLOOD PRESSURE: 131 MMHG

## 2018-09-11 VITALS
RESPIRATION RATE: 20 BRPM | HEART RATE: 75 BPM | HEIGHT: 65 IN | BODY MASS INDEX: 34.49 KG/M2 | OXYGEN SATURATION: 96 % | TEMPERATURE: 97 F | DIASTOLIC BLOOD PRESSURE: 75 MMHG | WEIGHT: 207 LBS | SYSTOLIC BLOOD PRESSURE: 123 MMHG

## 2018-09-11 DIAGNOSIS — N95.0 PMB (POSTMENOPAUSAL BLEEDING): ICD-10-CM

## 2018-09-11 PROCEDURE — 7100000011 HC PHASE II RECOVERY - ADDTL 15 MIN: Performed by: OBSTETRICS & GYNECOLOGY

## 2018-09-11 PROCEDURE — 2709999900 HC NON-CHARGEABLE SUPPLY: Performed by: OBSTETRICS & GYNECOLOGY

## 2018-09-11 PROCEDURE — 6360000002 HC RX W HCPCS

## 2018-09-11 PROCEDURE — 3700000001 HC ADD 15 MINUTES (ANESTHESIA): Performed by: OBSTETRICS & GYNECOLOGY

## 2018-09-11 PROCEDURE — 58558 HYSTEROSCOPY BIOPSY: CPT | Performed by: OBSTETRICS & GYNECOLOGY

## 2018-09-11 PROCEDURE — 3700000000 HC ANESTHESIA ATTENDED CARE: Performed by: OBSTETRICS & GYNECOLOGY

## 2018-09-11 PROCEDURE — 88305 TISSUE EXAM BY PATHOLOGIST: CPT

## 2018-09-11 PROCEDURE — 2580000003 HC RX 258: Performed by: OBSTETRICS & GYNECOLOGY

## 2018-09-11 PROCEDURE — 7100000001 HC PACU RECOVERY - ADDTL 15 MIN: Performed by: OBSTETRICS & GYNECOLOGY

## 2018-09-11 PROCEDURE — 3600000003 HC SURGERY LEVEL 3 BASE: Performed by: OBSTETRICS & GYNECOLOGY

## 2018-09-11 PROCEDURE — 7100000000 HC PACU RECOVERY - FIRST 15 MIN: Performed by: OBSTETRICS & GYNECOLOGY

## 2018-09-11 PROCEDURE — 7100000010 HC PHASE II RECOVERY - FIRST 15 MIN: Performed by: OBSTETRICS & GYNECOLOGY

## 2018-09-11 PROCEDURE — 3600000013 HC SURGERY LEVEL 3 ADDTL 15MIN: Performed by: OBSTETRICS & GYNECOLOGY

## 2018-09-11 RX ORDER — PROMETHAZINE HYDROCHLORIDE 25 MG/ML
25 INJECTION, SOLUTION INTRAMUSCULAR; INTRAVENOUS PRN
Status: DISCONTINUED | OUTPATIENT
Start: 2018-09-11 | End: 2018-09-11 | Stop reason: HOSPADM

## 2018-09-11 RX ORDER — MORPHINE SULFATE 2 MG/ML
2 INJECTION, SOLUTION INTRAMUSCULAR; INTRAVENOUS EVERY 5 MIN PRN
Status: DISCONTINUED | OUTPATIENT
Start: 2018-09-11 | End: 2018-09-11 | Stop reason: HOSPADM

## 2018-09-11 RX ORDER — MORPHINE SULFATE 2 MG/ML
1 INJECTION, SOLUTION INTRAMUSCULAR; INTRAVENOUS EVERY 5 MIN PRN
Status: DISCONTINUED | OUTPATIENT
Start: 2018-09-11 | End: 2018-09-11 | Stop reason: HOSPADM

## 2018-09-11 RX ORDER — SODIUM CHLORIDE, SODIUM LACTATE, POTASSIUM CHLORIDE, CALCIUM CHLORIDE 600; 310; 30; 20 MG/100ML; MG/100ML; MG/100ML; MG/100ML
INJECTION, SOLUTION INTRAVENOUS CONTINUOUS
Status: DISCONTINUED | OUTPATIENT
Start: 2018-09-11 | End: 2018-09-11 | Stop reason: SDUPTHER

## 2018-09-11 RX ORDER — SODIUM CHLORIDE 0.9 % (FLUSH) 0.9 %
10 SYRINGE (ML) INJECTION EVERY 12 HOURS SCHEDULED
Status: DISCONTINUED | OUTPATIENT
Start: 2018-09-11 | End: 2018-09-11 | Stop reason: SDUPTHER

## 2018-09-11 RX ORDER — MEPERIDINE HYDROCHLORIDE 50 MG/ML
12.5 INJECTION INTRAMUSCULAR; INTRAVENOUS; SUBCUTANEOUS EVERY 5 MIN PRN
Status: DISCONTINUED | OUTPATIENT
Start: 2018-09-11 | End: 2018-09-11 | Stop reason: HOSPADM

## 2018-09-11 RX ORDER — PROPOFOL 10 MG/ML
INJECTION, EMULSION INTRAVENOUS PRN
Status: DISCONTINUED | OUTPATIENT
Start: 2018-09-11 | End: 2018-09-11 | Stop reason: SDUPTHER

## 2018-09-11 RX ORDER — SODIUM CHLORIDE 0.9 % (FLUSH) 0.9 %
10 SYRINGE (ML) INJECTION EVERY 12 HOURS SCHEDULED
Status: DISCONTINUED | OUTPATIENT
Start: 2018-09-11 | End: 2018-09-11 | Stop reason: HOSPADM

## 2018-09-11 RX ORDER — SODIUM CHLORIDE 0.9 % (FLUSH) 0.9 %
10 SYRINGE (ML) INJECTION PRN
Status: DISCONTINUED | OUTPATIENT
Start: 2018-09-11 | End: 2018-09-11 | Stop reason: HOSPADM

## 2018-09-11 RX ORDER — NAPROXEN 250 MG/1
250 TABLET ORAL 2 TIMES DAILY PRN
COMMUNITY
End: 2018-12-26

## 2018-09-11 RX ORDER — FENTANYL CITRATE 50 UG/ML
INJECTION, SOLUTION INTRAMUSCULAR; INTRAVENOUS PRN
Status: DISCONTINUED | OUTPATIENT
Start: 2018-09-11 | End: 2018-09-11 | Stop reason: SDUPTHER

## 2018-09-11 RX ORDER — LABETALOL HYDROCHLORIDE 5 MG/ML
5 INJECTION, SOLUTION INTRAVENOUS EVERY 10 MIN PRN
Status: DISCONTINUED | OUTPATIENT
Start: 2018-09-11 | End: 2018-09-11 | Stop reason: HOSPADM

## 2018-09-11 RX ORDER — SODIUM CHLORIDE 0.9 % (FLUSH) 0.9 %
10 SYRINGE (ML) INJECTION PRN
Status: DISCONTINUED | OUTPATIENT
Start: 2018-09-11 | End: 2018-09-11 | Stop reason: SDUPTHER

## 2018-09-11 RX ORDER — SODIUM CHLORIDE, SODIUM LACTATE, POTASSIUM CHLORIDE, CALCIUM CHLORIDE 600; 310; 30; 20 MG/100ML; MG/100ML; MG/100ML; MG/100ML
INJECTION, SOLUTION INTRAVENOUS CONTINUOUS
Status: DISCONTINUED | OUTPATIENT
Start: 2018-09-11 | End: 2018-09-11 | Stop reason: HOSPADM

## 2018-09-11 RX ORDER — MIDAZOLAM HYDROCHLORIDE 1 MG/ML
INJECTION INTRAMUSCULAR; INTRAVENOUS PRN
Status: DISCONTINUED | OUTPATIENT
Start: 2018-09-11 | End: 2018-09-11 | Stop reason: SDUPTHER

## 2018-09-11 RX ORDER — ONDANSETRON 2 MG/ML
INJECTION INTRAMUSCULAR; INTRAVENOUS PRN
Status: DISCONTINUED | OUTPATIENT
Start: 2018-09-11 | End: 2018-09-11 | Stop reason: SDUPTHER

## 2018-09-11 RX ORDER — DEXAMETHASONE SODIUM PHOSPHATE 10 MG/ML
INJECTION INTRAMUSCULAR; INTRAVENOUS PRN
Status: DISCONTINUED | OUTPATIENT
Start: 2018-09-11 | End: 2018-09-11 | Stop reason: SDUPTHER

## 2018-09-11 RX ADMIN — ONDANSETRON 4 MG: 2 SOLUTION INTRAMUSCULAR; INTRAVENOUS at 08:33

## 2018-09-11 RX ADMIN — SODIUM CHLORIDE, POTASSIUM CHLORIDE, SODIUM LACTATE AND CALCIUM CHLORIDE: 600; 310; 30; 20 INJECTION, SOLUTION INTRAVENOUS at 07:58

## 2018-09-11 RX ADMIN — LIDOCAINE HYDROCHLORIDE 40 MG: 20 INJECTION, SOLUTION INTRAVENOUS at 08:28

## 2018-09-11 RX ADMIN — FENTANYL CITRATE 50 MCG: 50 INJECTION, SOLUTION INTRAMUSCULAR; INTRAVENOUS at 08:28

## 2018-09-11 RX ADMIN — MIDAZOLAM HYDROCHLORIDE 2 MG: 1 INJECTION, SOLUTION INTRAMUSCULAR; INTRAVENOUS at 08:25

## 2018-09-11 RX ADMIN — DEXAMETHASONE SODIUM PHOSPHATE 10 MG: 10 INJECTION INTRAMUSCULAR; INTRAVENOUS at 08:32

## 2018-09-11 RX ADMIN — PROPOFOL 200 MG: 10 INJECTION, EMULSION INTRAVENOUS at 08:28

## 2018-09-11 ASSESSMENT — PULMONARY FUNCTION TESTS
PIF_VALUE: 26
PIF_VALUE: 23
PIF_VALUE: 1
PIF_VALUE: 24
PIF_VALUE: 17
PIF_VALUE: 26
PIF_VALUE: 0
PIF_VALUE: 23
PIF_VALUE: 23
PIF_VALUE: 1
PIF_VALUE: 27
PIF_VALUE: 23
PIF_VALUE: 17
PIF_VALUE: 26
PIF_VALUE: 19
PIF_VALUE: 26
PIF_VALUE: 3
PIF_VALUE: 4
PIF_VALUE: 25
PIF_VALUE: 23
PIF_VALUE: 26
PIF_VALUE: 3
PIF_VALUE: 23
PIF_VALUE: 1
PIF_VALUE: 0
PIF_VALUE: 17
PIF_VALUE: 25
PIF_VALUE: 26
PIF_VALUE: 23
PIF_VALUE: 24
PIF_VALUE: 21
PIF_VALUE: 26
PIF_VALUE: 25
PIF_VALUE: 3
PIF_VALUE: 3
PIF_VALUE: 24

## 2018-09-11 ASSESSMENT — PAIN SCALES - GENERAL: PAINLEVEL_OUTOF10: 0

## 2018-09-11 ASSESSMENT — ENCOUNTER SYMPTOMS
COUGH: 1
RHINORRHEA: 0
SORE THROAT: 0
NAUSEA: 0
VOMITING: 0
ABDOMINAL PAIN: 0
SHORTNESS OF BREATH: 1

## 2018-09-11 ASSESSMENT — PAIN - FUNCTIONAL ASSESSMENT: PAIN_FUNCTIONAL_ASSESSMENT: 0-10

## 2018-09-11 NOTE — ANESTHESIA PRE PROCEDURE
Department of Anesthesiology  Preprocedure Note       Name:  Sai Marcelo   Age:  50 y.o.  :  1965                                          MRN:  [id number]         Date:  2018      Surgeon: Simeon Velazquez):  Spencer Aguilar MD    Procedure: Procedure(s):  DILATATION AND CURETTAGE HYSTEROSCOPY    Medications prior to admission:   Prior to Admission medications    Medication Sig Start Date End Date Taking?  Authorizing Provider   naproxen (NAPROSYN) 250 MG tablet Take 250 mg by mouth 2 times daily as needed for Pain   Yes Historical Provider, MD   PARoxetine (PAXIL) 20 MG tablet Take 2 tablets by mouth daily 9/10/18  Yes Katarzyna Doe MD   budesonide-formoterol [facility]) 160-4.5 MCG/ACT AERO Inhale 2 puffs into the lungs 2 times daily 18  Yes Asmita May MD   tiotropium (Rita Pushpa) 18 MCG inhalation capsule Inhale 1 capsule into the lungs daily  Patient taking differently: Inhale 18 mcg into the lungs nightly  18  Yes Asmita May MD   albuterol sulfate HFA (VENTOLIN HFA) 108 (90 Base) MCG/ACT inhaler Inhale 2 puffs into the lungs every 6 hours as needed for Wheezing  Patient taking differently: Inhale 2 puffs into the lungs every 6 hours as needed for Wheezing Bring day of surgery 18  Yes Asmita May MD   atorvastatin (LIPITOR) 20 MG tablet Take 1 tablet by mouth daily  Patient taking differently: Take 20 mg by mouth nightly  18  Yes Katarzyna Doe MD   ipratropium-albuterol (DUONEB) 0.5-2.5 (3) MG/3ML SOLN nebulizer solution Inhale 3 mLs into the lungs every 6 hours as needed for Shortness of Breath 18  Yes Jacqueline Trinh DO   loratadine (CLARITIN) 10 MG tablet Take 1 tablet by mouth daily 5/15/18  Yes Jacqueline Trinh DO   acetaminophen (TYLENOL) 500 MG tablet Take 500 mg by mouth every 4 hours as needed for Pain   Yes Historical Provider, MD   predniSONE (DELTASONE) 20 MG tablet Take 2 tablets by mouth daily for 5 days 9/10/18 9/15/18  Berto Cue obstructive pulmonary disease) ([facility] [address]     Hidradenitis suppurativa 4/17/2012    Hyperlipidemia     on statin    Hypertension     No longer on medications.  Intertrigo 4/17/2012    Moderate single current episode of major depressive disorder ([facility] [address] 3/26/2018    Onychomycosis 4/17/2012    Tobacco abuse        Past Surgical History:        Procedure Laterality Date    ANKLE SURGERY      COLONOSCOPY  08/02/2018    Diverticuli. Poor prep, consider repeat 5-10 years.  FRACTURE SURGERY  1994    Left Tibia    FRACTURE SURGERY      Multiple, different injuries    HERNIA REPAIR  8240    Umbilical hernia    UT COLONOSCOPY FLX DX W/COLLJ SPEC WHEN PFRMD N/A 8/14/2018    COLONOSCOPY DIAGNOSTIC OR SCREENING performed by Ugo Gonzalez MD at [address]  10/23/2012    Dr. Bib Wilkinson at [facility]. Social History:    Social History   Substance Use Topics    Smoking status: Former Smoker     Packs/day: 1.00     Years: 36.00     Types: Cigarettes     Quit date: 5/11/2018    Smokeless tobacco: Never Used    Alcohol use No      Comment: Previous alcoholism, 24 years clean.                                   Counseling given: Not Answered      Vital Signs (Current):   Vitals:    08/31/18 1151 09/11/18 0730   BP:  131/82   Pulse:  75   Resp:  17   Temp:  36.8 °C (98.2 °F)   TempSrc:  Temporal   SpO2:  95%   Weight: 207 lb (93.9 kg) 207 lb (93.9 kg)   Height: 5' 5\" (1.651 m) 5' 5\" (1.651 m)                                              BP Readings from Last 3 Encounters:   09/11/18 131/82   09/10/18 125/83   09/04/18 120/76       NPO Status: Time of last liquid consumption: 2130                        Time of last solid consumption: 2100                        Date of last liquid consumption: 09/10/18                        Date of last solid food consumption: 09/10/18    BMI:   Wt Readings from Last 3 Encounters:   09/11/18 207 lb (93.9 kg)   09/10/18 216 lb (98 kg)   09/04/18 210 lb (95.3 tendonitis of both shoulders  Bilateral carpal tunnel syndrome    PMB (postmenopausal bleeding) Abdominal:   (+) obese,     Abdomen: soft. Vascular: negative vascular ROS. Anesthesia Plan      general     ASA 3       Induction: intravenous. BIS    Anesthetic plan and risks discussed with patient. Plan discussed with CRNA and attending. Amanda Guerin RN   9/11/2018    Pt seen, examined, chart reviewed, plan discussed.   [facility]  9/11/2018

## 2018-09-11 NOTE — PROGRESS NOTES
Prednisone Rx called in to pharmacy
(PAXIL) 20 MG tablet Take 1 tablet by mouth daily (Patient taking differently: Take 20 mg by mouth nightly ) 30 tablet 3    ipratropium-albuterol (DUONEB) 0.5-2.5 (3) MG/3ML SOLN nebulizer solution Inhale 3 mLs into the lungs every 6 hours as needed for Shortness of Breath 360 mL 1    loratadine (CLARITIN) 10 MG tablet Take 1 tablet by mouth daily 30 tablet 5    EPINEPHrine (EPIPEN 2-CAMILLE) 0.3 MG/0.3ML SOAJ injection Use as directed for allergic reaction 2 each 1    acetaminophen (TYLENOL) 500 MG tablet Take 500 mg by mouth every 4 hours as needed for Pain       No current facility-administered medications for this visit. Allergies: Bee venom and Pcn [penicillins]    Review of Systems   Constitutional: Negative for chills, diaphoresis, fatigue and fever. HENT: Negative for congestion, postnasal drip, rhinorrhea and sore throat. Respiratory: Positive for cough and shortness of breath. Cardiovascular: Negative for chest pain, palpitations and leg swelling. Gastrointestinal: Negative for abdominal pain, nausea and vomiting. Hematological: Negative for adenopathy. Psychiatric/Behavioral: Positive for decreased concentration, dysphoric mood and sleep disturbance. Negative for suicidal ideas. The patient is not nervous/anxious. /83   Pulse 91   Temp 98.3 °F (36.8 °C) (Oral)   Resp 16   Ht 5' 5\" (1.651 m)   Wt 216 lb (98 kg)   LMP 09/13/2012   SpO2 97%   BMI 35.94 kg/m²     Physical Exam   Constitutional: She appears well-developed and well-nourished. No distress. Cardiovascular: Normal rate, regular rhythm, normal heart sounds and intact distal pulses. Exam reveals no gallop and no friction rub. No murmur heard. Pulmonary/Chest: Effort normal. She has decreased breath sounds (throughout). She has no wheezes. She has no rhonchi. She has no rales. Musculoskeletal: She exhibits no edema. Skin: She is not diaphoretic.        Assessment and Plan:  Chris Sauceda was seen today for

## 2018-09-11 NOTE — OP NOTE
[address]                   [address] [facility], [address] [place] [address]                                 OPERATIVE REPORT    PATIENT NAME: Irma Hernandez                     :        1965  MED REC NO:   [id number]                            ROOM:  ACCOUNT NO:   [de-identified]                           ADMIT DATE: 2018  PROVIDER:     Ayden Ayala MD    DATE OF PROCEDURE:  2018    PREOPERATIVE DIAGNOSIS:  Postmenopausal bleeding. POSTOPERATIVE DIAGNOSIS:  Postmenopausal bleeding plus endometrial polyps. OPERATION PERFORMED:  Hysteroscopy, dilatation and curettage. SURGEON:  Ayden Ayala MD    OPERATIVE PROCEDURE:  Under general anesthesia, dorsal lithotomy position,  the patient was prepped and draped in the usual sterile fashion. Weighted  speculum was placed. The anterior cervix was grasped with a sharp-tooth  tenaculum. The uterine sound shows a uterine depth between 7 and 8 cm in  the anterior mid-plane. Dilatation was then carried out with Hanks  dilators to #18 Hanks dilator whereupon the 5-mm contact hysteroscope was  was inserted, and direct contact hysteroscopy with saline infusion was  performed of the endometrial cavity. The hysteroscopy showed very atrophic  endometrium, inactive in appearance; however, there were two large  endometrial polyps noted. This would account for the thickened endometrial  stripe. All areas were photo-documented and the scope was removed. Dilatation carried out to #20 Hanks dilator. Polyp forceps were then utilized to explore the cavity and remove  endometrial polyps, which were sent as a separate specimen as endometrial  polyps. Several attempts at the end failed to remove any further tissue  and this portion was abandoned.   A #3 curette was now utilized to  systematically curette the endometrial cavity of a scanty amount of tissue,  which would be consistent with the prior biopsy that

## 2018-09-11 NOTE — BRIEF OP NOTE
Brief Postoperative Note  ______________________________________________________________    Patient: Reji Abdalla  YOB: 1965  MRN: [id number]  Date of Procedure: 9/11/2018    Pre-Op Diagnosis: POST MENOPAUSAL BLEED     Post-Op Diagnosis: Same, Plus endometriasl polyps.        Procedure(s):  DILATATION AND CURETTAGE HYSTEROSCOPY    Anesthesia: General    Surgeon(s):  Mahogany Esquivel MD    Staff:  Scrub Person First: Terrence Fernandez RN; Ok Patterson     Estimated Blood Loss: 5 mL    Complications: None    Specimens:   ID Type Source Tests Collected by Time Destination   A : ENDOMETRIAL CURETTINGS Tissue Cervix SURGICAL PATHOLOGY Mahogany Esquivel MD 9/11/2018 5457    B : UTERINE POLYP Tissue Uterus SURGICAL PATHOLOGY Mahogany Esquivel MD 9/11/2018 7369        Implants:  * No implants in log *      Drains:      Findings: See Dictation please    Mahogany Esquivel MD  Date: 9/11/2018  Time: 8:58 AM

## 2018-09-13 ENCOUNTER — HOSPITAL ENCOUNTER (OUTPATIENT)
Dept: PULMONOLOGY | Age: 53
Setting detail: THERAPIES SERIES
Discharge: HOME | End: 2018-09-13
Payer: MEDICARE

## 2018-09-18 ENCOUNTER — OFFICE VISIT (OUTPATIENT)
Dept: OBGYN | Age: 53
End: 2018-09-18
Payer: MEDICARE

## 2018-09-18 VITALS
TEMPERATURE: 98.2 F | WEIGHT: 210 LBS | BODY MASS INDEX: 34.99 KG/M2 | RESPIRATION RATE: 16 BRPM | DIASTOLIC BLOOD PRESSURE: 80 MMHG | SYSTOLIC BLOOD PRESSURE: 130 MMHG | HEART RATE: 86 BPM | HEIGHT: 65 IN

## 2018-09-18 DIAGNOSIS — Z98.890 POST-OPERATIVE STATE: Primary | ICD-10-CM

## 2018-09-18 PROCEDURE — 99024 POSTOP FOLLOW-UP VISIT: CPT | Performed by: OBSTETRICS & GYNECOLOGY

## 2018-09-18 PROCEDURE — 99211 OFF/OP EST MAY X REQ PHY/QHP: CPT | Performed by: OBSTETRICS & GYNECOLOGY

## 2018-09-18 NOTE — PROGRESS NOTES
Here for post op visit. Had some discomfort which is gone now. Pathology was benign poloyps. Discussed with the patient and will see her in about a year. Discussed at length.

## 2018-09-20 ENCOUNTER — HOSPITAL ENCOUNTER (OUTPATIENT)
Dept: PULMONOLOGY | Age: 53
Setting detail: THERAPIES SERIES
Discharge: HOME | End: 2018-09-20
Payer: MEDICARE

## 2018-09-20 VITALS
RESPIRATION RATE: 16 BRPM | HEIGHT: 65 IN | HEART RATE: 77 BPM | DIASTOLIC BLOOD PRESSURE: 92 MMHG | BODY MASS INDEX: 35.65 KG/M2 | WEIGHT: 214 LBS | SYSTOLIC BLOOD PRESSURE: 132 MMHG | OXYGEN SATURATION: 97 %

## 2018-09-20 PROCEDURE — G0424 PULMONARY REHAB W EXER: HCPCS

## 2018-09-20 ASSESSMENT — PATIENT HEALTH QUESTIONNAIRE - PHQ9: SUM OF ALL RESPONSES TO PHQ QUESTIONS 1-9: 24

## 2018-09-25 ENCOUNTER — HOSPITAL ENCOUNTER (OUTPATIENT)
Dept: PULMONOLOGY | Age: 53
Setting detail: THERAPIES SERIES
Discharge: HOME | End: 2018-09-25
Payer: MEDICARE

## 2018-09-25 PROCEDURE — G0424 PULMONARY REHAB W EXER: HCPCS

## 2018-09-27 ENCOUNTER — HOSPITAL ENCOUNTER (OUTPATIENT)
Dept: PULMONOLOGY | Age: 53
Setting detail: THERAPIES SERIES
Discharge: HOME | End: 2018-09-27
Payer: MEDICARE

## 2018-09-27 PROCEDURE — G0424 PULMONARY REHAB W EXER: HCPCS

## 2018-10-02 ENCOUNTER — HOSPITAL ENCOUNTER (OUTPATIENT)
Dept: PULMONOLOGY | Age: 53
Setting detail: THERAPIES SERIES
Discharge: HOME | End: 2018-10-02
Payer: MEDICARE

## 2018-10-02 PROCEDURE — G0424 PULMONARY REHAB W EXER: HCPCS

## 2018-10-04 ENCOUNTER — HOSPITAL ENCOUNTER (OUTPATIENT)
Dept: PULMONOLOGY | Age: 53
Setting detail: THERAPIES SERIES
Discharge: HOME | End: 2018-10-04
Payer: MEDICARE

## 2018-10-04 PROCEDURE — G0424 PULMONARY REHAB W EXER: HCPCS

## 2018-10-11 ENCOUNTER — OFFICE VISIT (OUTPATIENT)
Dept: FAMILY MEDICINE CLINIC | Age: 53
End: 2018-10-11
Payer: MEDICARE

## 2018-10-11 VITALS
WEIGHT: 217 LBS | HEIGHT: 65 IN | DIASTOLIC BLOOD PRESSURE: 77 MMHG | TEMPERATURE: 97.9 F | HEART RATE: 80 BPM | BODY MASS INDEX: 36.15 KG/M2 | RESPIRATION RATE: 18 BRPM | OXYGEN SATURATION: 95 % | SYSTOLIC BLOOD PRESSURE: 114 MMHG

## 2018-10-11 DIAGNOSIS — J43.2 CENTRILOBULAR EMPHYSEMA (HCC): ICD-10-CM

## 2018-10-11 DIAGNOSIS — T84.9XXS: ICD-10-CM

## 2018-10-11 DIAGNOSIS — G89.29 CHRONIC PAIN OF LEFT ANKLE: ICD-10-CM

## 2018-10-11 DIAGNOSIS — M25.572 CHRONIC PAIN OF LEFT ANKLE: ICD-10-CM

## 2018-10-11 DIAGNOSIS — F32.1 MODERATE SINGLE CURRENT EPISODE OF MAJOR DEPRESSIVE DISORDER (HCC): ICD-10-CM

## 2018-10-11 PROCEDURE — 3023F SPIROM DOC REV: CPT | Performed by: STUDENT IN AN ORGANIZED HEALTH CARE EDUCATION/TRAINING PROGRAM

## 2018-10-11 PROCEDURE — 3017F COLORECTAL CA SCREEN DOC REV: CPT | Performed by: STUDENT IN AN ORGANIZED HEALTH CARE EDUCATION/TRAINING PROGRAM

## 2018-10-11 PROCEDURE — G8427 DOCREV CUR MEDS BY ELIG CLIN: HCPCS | Performed by: STUDENT IN AN ORGANIZED HEALTH CARE EDUCATION/TRAINING PROGRAM

## 2018-10-11 PROCEDURE — 1036F TOBACCO NON-USER: CPT | Performed by: STUDENT IN AN ORGANIZED HEALTH CARE EDUCATION/TRAINING PROGRAM

## 2018-10-11 PROCEDURE — 99213 OFFICE O/P EST LOW 20 MIN: CPT | Performed by: STUDENT IN AN ORGANIZED HEALTH CARE EDUCATION/TRAINING PROGRAM

## 2018-10-11 PROCEDURE — 99212 OFFICE O/P EST SF 10 MIN: CPT | Performed by: STUDENT IN AN ORGANIZED HEALTH CARE EDUCATION/TRAINING PROGRAM

## 2018-10-11 PROCEDURE — G8417 CALC BMI ABV UP PARAM F/U: HCPCS | Performed by: STUDENT IN AN ORGANIZED HEALTH CARE EDUCATION/TRAINING PROGRAM

## 2018-10-11 PROCEDURE — G8599 NO ASA/ANTIPLAT THER USE RNG: HCPCS | Performed by: STUDENT IN AN ORGANIZED HEALTH CARE EDUCATION/TRAINING PROGRAM

## 2018-10-11 PROCEDURE — G8484 FLU IMMUNIZE NO ADMIN: HCPCS | Performed by: STUDENT IN AN ORGANIZED HEALTH CARE EDUCATION/TRAINING PROGRAM

## 2018-10-11 PROCEDURE — G8926 SPIRO NO PERF OR DOC: HCPCS | Performed by: STUDENT IN AN ORGANIZED HEALTH CARE EDUCATION/TRAINING PROGRAM

## 2018-10-11 RX ORDER — ESCITALOPRAM OXALATE 10 MG/1
10 TABLET ORAL DAILY
Qty: 30 TABLET | Refills: 3 | Status: SHIPPED | OUTPATIENT
Start: 2018-10-11 | End: 2019-02-11 | Stop reason: SDUPTHER

## 2018-10-11 NOTE — PROGRESS NOTES
Chief Complaint: follow up COPD, depression. Also with left ankle pain    Interval History: Steroid burst didn't help much last time, but symptoms improved 2 days after stopping steroid burst.  Patient still with SOB, but at baseline. Increase in paroxetine did not help at all. No suicidal ideation. Mood still very poor today, continues to have difficulty sleeping. Wishes to go live in the woods alone, tired of other people. Ankle bothering her, left, history of significant trauma. Past Medical History:   Diagnosis Date    Adhesive capsulitis of right shoulder     s/p rotator cuff injury and repair; follows with Dr. Royer Fair at [facility].  Alcoholism ([facility] [address]     Quit 24 years ago.  Allergic rhinosinusitis     Asthma     Rare use of albuterol.  Bilateral carpal tunnel syndrome 3/26/2018    COPD (chronic obstructive pulmonary disease) (HCC)     Hidradenitis suppurativa 4/17/2012    Hyperlipidemia     on statin    Hypertension     No longer on medications.  Intertrigo 4/17/2012    Moderate single current episode of major depressive disorder ([facility] [address] 3/26/2018    Onychomycosis 4/17/2012    Tobacco abuse        Past Surgical History:   Procedure Laterality Date    ANKLE SURGERY      COLONOSCOPY  08/02/2018    Diverticuli. Poor prep, consider repeat 5-10 years.  FRACTURE SURGERY  1994    Left Tibia    FRACTURE SURGERY      Multiple, different injuries    HERNIA REPAIR  2234    Umbilical hernia    NM COLONOSCOPY FLX DX W/COLLJ SPEC WHEN PFRMD N/A 8/14/2018    COLONOSCOPY DIAGNOSTIC OR SCREENING performed by Libby Milton MD at [address] [facility] BX N/A 9/11/2018    DILATATION AND CURETTAGE HYSTEROSCOPY performed by Annmarie Hunter MD at [address]  10/23/2012    Dr. Royer Fair at [facility].          Family History   Problem Relation Age of Onset    Cancer Mother 37        CA in shoulder, lungs    High Blood Pressure Mother     Heart Disease Mother     Mental Illness Mother     High Cholesterol Mother     Arthritis Mother     Heart Attack Mother 43    Cancer Father 64        Throat CA    Substance Abuse Father         Alcohol, throat CA    High Blood Pressure Father     High Cholesterol Father     Diabetes Maternal Aunt        Social History     Social History    Marital status: Legally      Spouse name: N/A    Number of children: N/A    Years of education: N/A     Occupational History           Social History Main Topics    Smoking status: Former Smoker     Packs/day: 1.00     Years: 36.00     Types: Cigarettes     Quit date: 5/11/2018    Smokeless tobacco: Never Used    Alcohol use No      Comment: Previous alcoholism, 24 years clean.       Drug use: No    Sexual activity: Not Currently     Partners: Male     Other Topics Concern    Not on file     Social History Narrative    No narrative on file       Current Outpatient Prescriptions   Medication Sig Dispense Refill    naproxen (NAPROSYN) 250 MG tablet Take 250 mg by mouth 2 times daily as needed for Pain      PARoxetine (PAXIL) 20 MG tablet Take 2 tablets by mouth daily 30 tablet 3    budesonide-formoterol (SYMBICORT) 160-4.5 MCG/ACT AERO Inhale 2 puffs into the lungs 2 times daily 1 Inhaler 5    tiotropium (SPIRIVA HANDIHALER) 18 MCG inhalation capsule Inhale 1 capsule into the lungs daily (Patient taking differently: Inhale 18 mcg into the lungs nightly ) 30 capsule 5    albuterol sulfate HFA (VENTOLIN HFA) 108 (90 Base) MCG/ACT inhaler Inhale 2 puffs into the lungs every 6 hours as needed for Wheezing (Patient taking differently: Inhale 2 puffs into the lungs every 6 hours as needed for Wheezing Bring day of surgery) 1 Inhaler 3    albuterol sulfate HFA (PROVENTIL HFA) 108 (90 Base) MCG/ACT inhaler Inhale 2 puffs into the lungs every 6 hours as needed for Wheezing 1 Inhaler 3    atorvastatin (LIPITOR) 20 MG tablet Take 1 tablet by mouth daily exhibits no edema. Pain to palpation over dorsal aspect of left ankle and over lateral malleolus. Decreased range of motion. Skin: She is not diaphoretic. Psychiatric: Her speech is normal. Her affect is blunt. She is not slowed. Assessment and Plan:  Willam Light was seen today for copd and ankle pain. Diagnoses and all orders for this visit:    Centrilobular emphysema ([address]  Continue present management, follow up as scheduled with pulmonology. Moderate single current episode of major depressive disorder ([address]  Will switch from paroxetine to escitalopram, will re-evaluate in 1 month for efficacy. -     escitalopram (LEXAPRO) 10 MG tablet; Take 1 tablet by mouth daily    Complication of internal fixation device of left tibia, sequela  Chronic pain of left ankle  Patient due to follow up with orthopedics for this issue, patient instructed to make appointment. Call or go to ED immediately if symptoms worsen or persist.  Return in about 1 month (around 11/11/2018) for Exam following major depression, COPD., or sooner if necessary. All questions answered. Bryce Rose MD  Family Medicine Resident, PGY-3

## 2018-10-15 ASSESSMENT — ENCOUNTER SYMPTOMS
VOMITING: 0
COUGH: 0
SHORTNESS OF BREATH: 1
RHINORRHEA: 0
SORE THROAT: 0
NAUSEA: 0
ABDOMINAL PAIN: 0

## 2018-10-16 ENCOUNTER — HOSPITAL ENCOUNTER (OUTPATIENT)
Dept: PULMONOLOGY | Age: 53
Setting detail: THERAPIES SERIES
Discharge: HOME | End: 2018-10-16
Payer: MEDICARE

## 2018-10-16 PROCEDURE — G0424 PULMONARY REHAB W EXER: HCPCS

## 2018-10-18 ENCOUNTER — HOSPITAL ENCOUNTER (OUTPATIENT)
Dept: PULMONOLOGY | Age: 53
Setting detail: THERAPIES SERIES
Discharge: HOME | End: 2018-10-18
Payer: MEDICARE

## 2018-10-18 PROCEDURE — G0424 PULMONARY REHAB W EXER: HCPCS

## 2018-10-23 ENCOUNTER — HOSPITAL ENCOUNTER (OUTPATIENT)
Dept: PULMONOLOGY | Age: 53
Setting detail: THERAPIES SERIES
Discharge: HOME | End: 2018-10-23
Payer: MEDICARE

## 2018-10-23 PROCEDURE — G0424 PULMONARY REHAB W EXER: HCPCS

## 2018-10-25 ENCOUNTER — HOSPITAL ENCOUNTER (OUTPATIENT)
Dept: PULMONOLOGY | Age: 53
Setting detail: THERAPIES SERIES
Discharge: HOME | End: 2018-10-25
Payer: MEDICARE

## 2018-10-25 PROCEDURE — G0424 PULMONARY REHAB W EXER: HCPCS

## 2018-10-30 ENCOUNTER — HOSPITAL ENCOUNTER (OUTPATIENT)
Dept: PULMONOLOGY | Age: 53
Setting detail: THERAPIES SERIES
Discharge: HOME | End: 2018-10-30
Payer: MEDICARE

## 2018-10-30 PROCEDURE — G0424 PULMONARY REHAB W EXER: HCPCS

## 2018-11-01 ENCOUNTER — HOSPITAL ENCOUNTER (OUTPATIENT)
Dept: PULMONOLOGY | Age: 53
Setting detail: THERAPIES SERIES
Discharge: HOME | End: 2018-11-01
Payer: MEDICARE

## 2018-11-01 PROCEDURE — G0424 PULMONARY REHAB W EXER: HCPCS

## 2018-11-06 ENCOUNTER — OFFICE VISIT (OUTPATIENT)
Dept: FAMILY MEDICINE CLINIC | Age: 53
End: 2018-11-06
Payer: MEDICARE

## 2018-11-06 ENCOUNTER — HOSPITAL ENCOUNTER (OUTPATIENT)
Dept: PULMONOLOGY | Age: 53
Setting detail: THERAPIES SERIES
Discharge: HOME | End: 2018-11-06
Payer: MEDICARE

## 2018-11-06 VITALS
HEART RATE: 86 BPM | BODY MASS INDEX: 36.65 KG/M2 | WEIGHT: 220 LBS | OXYGEN SATURATION: 95 % | DIASTOLIC BLOOD PRESSURE: 76 MMHG | HEIGHT: 65 IN | SYSTOLIC BLOOD PRESSURE: 116 MMHG | TEMPERATURE: 98 F

## 2018-11-06 DIAGNOSIS — Z23 NEED FOR INFLUENZA VACCINATION: ICD-10-CM

## 2018-11-06 DIAGNOSIS — J43.2 CENTRILOBULAR EMPHYSEMA (HCC): Primary | ICD-10-CM

## 2018-11-06 PROCEDURE — G0424 PULMONARY REHAB W EXER: HCPCS

## 2018-11-06 PROCEDURE — 6360000002 HC RX W HCPCS

## 2018-11-06 PROCEDURE — 3017F COLORECTAL CA SCREEN DOC REV: CPT | Performed by: FAMILY MEDICINE

## 2018-11-06 PROCEDURE — 3023F SPIROM DOC REV: CPT | Performed by: FAMILY MEDICINE

## 2018-11-06 PROCEDURE — G8926 SPIRO NO PERF OR DOC: HCPCS | Performed by: FAMILY MEDICINE

## 2018-11-06 PROCEDURE — G8599 NO ASA/ANTIPLAT THER USE RNG: HCPCS | Performed by: FAMILY MEDICINE

## 2018-11-06 PROCEDURE — 99213 OFFICE O/P EST LOW 20 MIN: CPT | Performed by: FAMILY MEDICINE

## 2018-11-06 PROCEDURE — 90686 IIV4 VACC NO PRSV 0.5 ML IM: CPT

## 2018-11-06 PROCEDURE — G8427 DOCREV CUR MEDS BY ELIG CLIN: HCPCS | Performed by: FAMILY MEDICINE

## 2018-11-06 PROCEDURE — G0008 ADMIN INFLUENZA VIRUS VAC: HCPCS

## 2018-11-06 PROCEDURE — G8417 CALC BMI ABV UP PARAM F/U: HCPCS | Performed by: FAMILY MEDICINE

## 2018-11-06 PROCEDURE — 1036F TOBACCO NON-USER: CPT | Performed by: FAMILY MEDICINE

## 2018-11-06 PROCEDURE — 99212 OFFICE O/P EST SF 10 MIN: CPT | Performed by: FAMILY MEDICINE

## 2018-11-06 PROCEDURE — G8482 FLU IMMUNIZE ORDER/ADMIN: HCPCS | Performed by: FAMILY MEDICINE

## 2018-11-06 NOTE — PROGRESS NOTES
CC:  Follow up COPD    HPI:  50 y.o. female presents for follow up. COPD. Wheezing with change in weather. Avoids perfume, irritants. Pulmonary rehab tough, hurts shoulder and ankle, but completing this soon. Pursuing disability. Still unable to tolerate being outside with dyspnea on exertion. Checks pulse ox at home, stable. Fatigues easily. Feels exhausted easily. Has not been smoking. Sees pulm on December 2. Takes albuterol occasionally, but does not help much. Has spirometry coming up this week. Will fax us the results. Taking medications regularly, which have helped her symptoms. Plans to follow up with ortho for joint pains. Did not see ortho yet. History of left ankle/tibia fractures, surgery. Had followed up with Gyn, no malignancy with endometrial tissue identified.       Patient Active Problem List    Diagnosis Date Noted    PMB (postmenopausal bleeding) 09/06/2018    Shortness of breath     Pulmonary nodules 05/16/2018    Bee sting allergy 05/04/2018    Post-traumatic arthritis of left ankle 04/24/2018    Bilateral carpal tunnel syndrome 03/26/2018    Moderate single current episode of major depressive disorder ([facility] [address] 03/26/2018    Biceps tendonitis of both shoulders 05/12/2016    Right shoulder pain 11/05/2013    Hidradenitis suppurativa 04/17/2012    Intertrigo 04/17/2012    Onychomycosis 04/17/2012    Adhesive capsulitis of right shoulder     Hypertension     Hyperlipidemia     Tobacco abuse     COPD (chronic obstructive pulmonary disease) (HCC)     Allergic rhinosinusitis     Rotator cuff (capsule) sprain 07/01/2011       Current Outpatient Prescriptions on File Prior to Visit   Medication Sig Dispense Refill    escitalopram (LEXAPRO) 10 MG tablet Take 1 tablet by mouth daily 30 tablet 3    naproxen (NAPROSYN) 250 MG tablet Take 250 mg by mouth 2 times daily as needed for Pain      budesonide-formoterol (SYMBICORT) 160-4.5 MCG/ACT AERO Inhale 2 puffs mass, non-tender  Lungs:  Decreased but equal, unlabored. No W/R/R. Heart:  Heart regular rate and rhythm  Abdomen:  Soft, NT, ND   Extremities: Extremities warm to touch, pink, with no edema. No calf tenderness, Addis's negative bilaterally. Left ankle with diffuse tenderness anteriorly and laterally. Anteriorly over distal leg, superficial area of round subcutaneous nodule, possibly lipoma versus muscular lesion, mildly tender, approx 1 cm. Few varicosities bilateral LE, compressible, nontender. No erythema. Assessments:      Diagnosis Orders   1. Centrilobular emphysema ([address]     2. Need for influenza vaccination  INFLUENZA, QUADV, 3 YRS AND OLDER, IM, PF, PREFILL SYR OR SDV, 0.5ML (FLUZONE QUADV, PF)       Plans:    As Above. Please see Patient Instructions for further counseling and information given. RTO 2 months or sooner prn. Given a work excuse to remain off work until re-evaluated by pulmonology, anticipate 2 months. Will follow. Currently active, with LE pain, but no signs/symptoms of DVT, and less likely, as pain is primarily in the area of old ankle injuries and chronic, and she has been active and has quit smoking. Discussed warning signs/symptoms; advised to seek care with any symptoms or concerns. She understands. Follow for growth or change or the lesion on anterior shin; call with concerns. Advised to please be adherent to the treatment plans discussed today, and please call with any questions or concerns, letting the office know of any reasons that the plans may not be followed. The risks of untreated conditions include worsening illness, injury, disability, and possibly, death. Please call if symptoms change in any way, worsen, or fail to completely resolve, as this could necessitate a change to treatment plans. Patient and/or caregiver expressed understanding. Indications and proper use of medication(s) reviewed.   Potential side-effects and risks of

## 2018-11-08 ENCOUNTER — HOSPITAL ENCOUNTER (OUTPATIENT)
Dept: PULMONOLOGY | Age: 53
Setting detail: THERAPIES SERIES
Discharge: HOME | End: 2018-11-08
Payer: MEDICARE

## 2018-11-08 PROCEDURE — G0424 PULMONARY REHAB W EXER: HCPCS

## 2018-11-13 ENCOUNTER — HOSPITAL ENCOUNTER (OUTPATIENT)
Dept: PULMONOLOGY | Age: 53
Setting detail: THERAPIES SERIES
Discharge: HOME | End: 2018-11-13
Payer: MEDICARE

## 2018-11-13 PROCEDURE — G0424 PULMONARY REHAB W EXER: HCPCS

## 2018-11-27 ENCOUNTER — TELEPHONE (OUTPATIENT)
Dept: ADMINISTRATIVE | Age: 53
End: 2018-11-27

## 2018-12-07 ENCOUNTER — HOSPITAL ENCOUNTER (OUTPATIENT)
Dept: SLEEP CENTER | Age: 53
Discharge: HOME | End: 2018-12-07
Payer: MEDICARE

## 2018-12-07 DIAGNOSIS — R40.0 SLEEPINESS: ICD-10-CM

## 2018-12-07 DIAGNOSIS — E66.9 OBESITY (BMI 30-39.9): ICD-10-CM

## 2018-12-07 DIAGNOSIS — R53.82 CHRONIC FATIGUE: ICD-10-CM

## 2018-12-07 DIAGNOSIS — R06.83 SNORING: ICD-10-CM

## 2018-12-07 PROCEDURE — 95810 POLYSOM 6/> YRS 4/> PARAM: CPT

## 2018-12-08 VITALS
BODY MASS INDEX: 35.99 KG/M2 | HEIGHT: 65 IN | SYSTOLIC BLOOD PRESSURE: 144 MMHG | DIASTOLIC BLOOD PRESSURE: 97 MMHG | RESPIRATION RATE: 18 BRPM | HEART RATE: 77 BPM | OXYGEN SATURATION: 95 % | WEIGHT: 216 LBS

## 2018-12-08 ASSESSMENT — SLEEP AND FATIGUE QUESTIONNAIRES
HOW LIKELY ARE YOU TO NOD OFF OR FALL ASLEEP WHEN YOU ARE A PASSENGER IN A CAR FOR AN HOUR WITHOUT A BREAK: 1
HOW LIKELY ARE YOU TO NOD OFF OR FALL ASLEEP WHILE SITTING AND TALKING TO SOMEONE: 0
HOW LIKELY ARE YOU TO NOD OFF OR FALL ASLEEP WHILE WATCHING TV: 3
HOW LIKELY ARE YOU TO NOD OFF OR FALL ASLEEP IN A CAR, WHILE STOPPED FOR A FEW MINUTES IN TRAFFIC: 0
ESS TOTAL SCORE: 9
HOW LIKELY ARE YOU TO NOD OFF OR FALL ASLEEP WHILE LYING DOWN TO REST IN THE AFTERNOON WHEN CIRCUMSTANCES PERMIT: 3
HOW LIKELY ARE YOU TO NOD OFF OR FALL ASLEEP WHILE SITTING AND READING: 1
HOW LIKELY ARE YOU TO NOD OFF OR FALL ASLEEP WHILE SITTING QUIETLY AFTER LUNCH WITHOUT ALCOHOL: 1
HOW LIKELY ARE YOU TO NOD OFF OR FALL ASLEEP WHILE SITTING INACTIVE IN A PUBLIC PLACE: 0

## 2018-12-10 ENCOUNTER — OFFICE VISIT (OUTPATIENT)
Dept: FAMILY MEDICINE CLINIC | Age: 53
End: 2018-12-10
Payer: MEDICARE

## 2018-12-10 VITALS
HEART RATE: 86 BPM | DIASTOLIC BLOOD PRESSURE: 89 MMHG | WEIGHT: 222 LBS | TEMPERATURE: 98 F | SYSTOLIC BLOOD PRESSURE: 129 MMHG | OXYGEN SATURATION: 95 % | HEIGHT: 65 IN | BODY MASS INDEX: 36.99 KG/M2 | RESPIRATION RATE: 18 BRPM

## 2018-12-10 DIAGNOSIS — R06.02 SHORTNESS OF BREATH: ICD-10-CM
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
## 2018-12-10 DIAGNOSIS — J43.2 CENTRILOBULAR EMPHYSEMA (HCC): ICD-10-CM

## 2018-12-10 PROCEDURE — 3023F SPIROM DOC REV: CPT | Performed by: STUDENT IN AN ORGANIZED HEALTH CARE EDUCATION/TRAINING PROGRAM

## 2018-12-10 PROCEDURE — G8926 SPIRO NO PERF OR DOC: HCPCS | Performed by: STUDENT IN AN ORGANIZED HEALTH CARE EDUCATION/TRAINING PROGRAM

## 2018-12-10 PROCEDURE — G8417 CALC BMI ABV UP PARAM F/U: HCPCS | Performed by: STUDENT IN AN ORGANIZED HEALTH CARE EDUCATION/TRAINING PROGRAM

## 2018-12-10 PROCEDURE — 99212 OFFICE O/P EST SF 10 MIN: CPT | Performed by: STUDENT IN AN ORGANIZED HEALTH CARE EDUCATION/TRAINING PROGRAM

## 2018-12-10 PROCEDURE — G8427 DOCREV CUR MEDS BY ELIG CLIN: HCPCS | Performed by: STUDENT IN AN ORGANIZED HEALTH CARE EDUCATION/TRAINING PROGRAM

## 2018-12-10 PROCEDURE — G8599 NO ASA/ANTIPLAT THER USE RNG: HCPCS | Performed by: STUDENT IN AN ORGANIZED HEALTH CARE EDUCATION/TRAINING PROGRAM

## 2018-12-10 PROCEDURE — 3017F COLORECTAL CA SCREEN DOC REV: CPT | Performed by: STUDENT IN AN ORGANIZED HEALTH CARE EDUCATION/TRAINING PROGRAM

## 2018-12-10 PROCEDURE — 99213 OFFICE O/P EST LOW 20 MIN: CPT | Performed by: STUDENT IN AN ORGANIZED HEALTH CARE EDUCATION/TRAINING PROGRAM

## 2018-12-10 PROCEDURE — 1036F TOBACCO NON-USER: CPT | Performed by: STUDENT IN AN ORGANIZED HEALTH CARE EDUCATION/TRAINING PROGRAM

## 2018-12-10 PROCEDURE — G8482 FLU IMMUNIZE ORDER/ADMIN: HCPCS | Performed by: STUDENT IN AN ORGANIZED HEALTH CARE EDUCATION/TRAINING PROGRAM

## 2018-12-11 ASSESSMENT — ENCOUNTER SYMPTOMS
SHORTNESS OF BREATH: 1
NAUSEA: 0
VOMITING: 0
SORE THROAT: 0
COUGH: 0
RHINORRHEA: 0
ABDOMINAL PAIN: 0

## 2018-12-11 NOTE — PROGRESS NOTES
[address] [address]                               SLEEP STUDY REPORT    PATIENT NAME: Mynor Dickinson                     :        1965  MED REC NO:   [id number]                            ROOM:  ACCOUNT NO:   [de-identified]                           ADMIT DATE: 2018  PROVIDER:     Margareth Saldana MD    DATE OF STUDY:  2018    REFERRING PROVIDER:  Jammie Michel M.D.    STUDY PERFORMED:  Polysomnography. INDICATION FOR POLYSOMNOGRAPHY:  Excessive daytime sleepiness, wakes  gasping, snore, morning headaches, trouble with memory/concentration,  and awakens at night screaming. CURRENT MEDICATIONS:  Lexapro, naproxen, Symbicort, Spiriva, Proventil,  Lipitor, ipratropium/albuterol nebulizer, Claritin, and epinephrine. INTERPRETATION:  SLEEP ARCHITECTURE:  This patient had a total time in bed of 397  minutes. Total sleep time was 306 minutes. Sleep efficiency was 77%. Sleep latency was 31 minutes. REM latency was 140 minutes. SLEEP STAGING:  The patient was awake 23% of the time in bed. Stage N1  was 13% and N2 was 74% of the total sleep time. Slow wave sleep was 4%  of the sleep time and stage REM sleep was 10% of the sleep time. RESPIRATION SUMMARY:  APNEA:  There were no apneic events. HYPOPNEA:  There were five hypopneic events, one occurred during REM  stage sleep. Maximum duration was 21 seconds. APNEA/HYPOPNEA INDEX:  The apnea/hypopnea index is one. Three events  occurred in the supine position. AROUSAL ANALYSIS:  There were 91 arousals/awakenings, the sleep  disruption index was 18. LIMB MOVEMENT SUMMARY:  There were 30 limb movements, the limb movement  index was normal.    OXYGEN SATURATION:  Average oxygen saturation while awake was 91%. Lowest saturation was 83%. The patient spent 79% of the time with  saturation at or greater than 90%.   Nineteen percent of the

## 2018-12-17 RX ORDER — LORATADINE 10 MG/1
10 TABLET ORAL DAILY
Qty: 90 TABLET | Refills: 3 | Status: SHIPPED
Start: 2018-12-17 | End: 2020-04-01 | Stop reason: SDUPTHER

## 2018-12-26 RX ORDER — MELOXICAM 7.5 MG/1
7.5 TABLET ORAL DAILY
Qty: 30 TABLET | Refills: 2 | Status: SHIPPED | OUTPATIENT
Start: 2018-12-26 | End: 2019-02-11 | Stop reason: SDUPTHER

## 2019-01-11 ENCOUNTER — TELEPHONE (OUTPATIENT)
Dept: FAMILY MEDICINE CLINIC | Age: 54
End: 2019-01-11

## 2019-01-21 ENCOUNTER — OFFICE VISIT (OUTPATIENT)
Dept: FAMILY MEDICINE CLINIC | Age: 54
End: 2019-01-21
Payer: MEDICARE

## 2019-01-21 ENCOUNTER — HOSPITAL ENCOUNTER (OUTPATIENT)
Dept: GENERAL RADIOLOGY | Age: 54
Discharge: HOME | End: 2019-01-23
Payer: MEDICARE

## 2019-01-21 ENCOUNTER — HOSPITAL ENCOUNTER (OUTPATIENT)
Age: 54
Discharge: HOME | End: 2019-01-23
Payer: MEDICARE

## 2019-01-21 VITALS
HEART RATE: 90 BPM | OXYGEN SATURATION: 96 % | SYSTOLIC BLOOD PRESSURE: 136 MMHG | BODY MASS INDEX: 36.99 KG/M2 | TEMPERATURE: 97.7 F | RESPIRATION RATE: 16 BRPM | DIASTOLIC BLOOD PRESSURE: 86 MMHG | HEIGHT: 65 IN | WEIGHT: 222 LBS

## 2019-01-21 DIAGNOSIS — M89.8X1 PAIN OF RIGHT CLAVICLE: ICD-10-CM

## 2019-01-21 DIAGNOSIS — E78.00 PURE HYPERCHOLESTEROLEMIA: ICD-10-CM

## 2019-01-21 DIAGNOSIS — G47.00 INSOMNIA, UNSPECIFIED TYPE: ICD-10-CM

## 2019-01-21 DIAGNOSIS — M89.8X1 PAIN OF RIGHT CLAVICLE: Primary | ICD-10-CM

## 2019-01-21 DIAGNOSIS — R06.09 DYSPNEA ON EXERTION: ICD-10-CM

## 2019-01-21 PROCEDURE — G8482 FLU IMMUNIZE ORDER/ADMIN: HCPCS | Performed by: FAMILY MEDICINE

## 2019-01-21 PROCEDURE — 99214 OFFICE O/P EST MOD 30 MIN: CPT | Performed by: FAMILY MEDICINE

## 2019-01-21 PROCEDURE — 1036F TOBACCO NON-USER: CPT | Performed by: FAMILY MEDICINE

## 2019-01-21 PROCEDURE — 73000 X-RAY EXAM OF COLLAR BONE: CPT

## 2019-01-21 PROCEDURE — G8417 CALC BMI ABV UP PARAM F/U: HCPCS | Performed by: FAMILY MEDICINE

## 2019-01-21 PROCEDURE — 99212 OFFICE O/P EST SF 10 MIN: CPT | Performed by: FAMILY MEDICINE

## 2019-01-21 PROCEDURE — G8427 DOCREV CUR MEDS BY ELIG CLIN: HCPCS | Performed by: FAMILY MEDICINE

## 2019-01-21 PROCEDURE — 3017F COLORECTAL CA SCREEN DOC REV: CPT | Performed by: FAMILY MEDICINE

## 2019-02-11 ENCOUNTER — OFFICE VISIT (OUTPATIENT)
Dept: FAMILY MEDICINE CLINIC | Age: 54
End: 2019-02-11
Payer: MEDICARE

## 2019-02-11 VITALS
DIASTOLIC BLOOD PRESSURE: 78 MMHG | SYSTOLIC BLOOD PRESSURE: 122 MMHG | HEART RATE: 79 BPM | WEIGHT: 222 LBS | HEIGHT: 65 IN | TEMPERATURE: 97.7 F | OXYGEN SATURATION: 95 % | BODY MASS INDEX: 36.99 KG/M2

## 2019-02-11 DIAGNOSIS — J44.9 CHRONIC OBSTRUCTIVE PULMONARY DISEASE, UNSPECIFIED COPD TYPE (HCC): Primary | ICD-10-CM

## 2019-02-11 DIAGNOSIS — H93.13 TINNITUS OF BOTH EARS: ICD-10-CM

## 2019-02-11 DIAGNOSIS — R41.3 MEMORY CHANGE: ICD-10-CM

## 2019-02-11 DIAGNOSIS — E78.00 PURE HYPERCHOLESTEROLEMIA: ICD-10-CM

## 2019-02-11 PROCEDURE — G8427 DOCREV CUR MEDS BY ELIG CLIN: HCPCS | Performed by: FAMILY MEDICINE

## 2019-02-11 PROCEDURE — 3023F SPIROM DOC REV: CPT | Performed by: FAMILY MEDICINE

## 2019-02-11 PROCEDURE — G8926 SPIRO NO PERF OR DOC: HCPCS | Performed by: FAMILY MEDICINE

## 2019-02-11 PROCEDURE — 96160 PT-FOCUSED HLTH RISK ASSMT: CPT | Performed by: FAMILY MEDICINE

## 2019-02-11 PROCEDURE — G8482 FLU IMMUNIZE ORDER/ADMIN: HCPCS | Performed by: FAMILY MEDICINE

## 2019-02-11 PROCEDURE — 3017F COLORECTAL CA SCREEN DOC REV: CPT | Performed by: FAMILY MEDICINE

## 2019-02-11 PROCEDURE — 1036F TOBACCO NON-USER: CPT | Performed by: FAMILY MEDICINE

## 2019-02-11 PROCEDURE — 99212 OFFICE O/P EST SF 10 MIN: CPT | Performed by: FAMILY MEDICINE

## 2019-02-11 PROCEDURE — G8417 CALC BMI ABV UP PARAM F/U: HCPCS | Performed by: FAMILY MEDICINE

## 2019-02-11 PROCEDURE — 99213 OFFICE O/P EST LOW 20 MIN: CPT | Performed by: FAMILY MEDICINE

## 2019-02-11 RX ORDER — IPRATROPIUM BROMIDE AND ALBUTEROL SULFATE 2.5; .5 MG/3ML; MG/3ML
1 SOLUTION RESPIRATORY (INHALATION) EVERY 6 HOURS PRN
Qty: 360 ML | Refills: 1 | Status: SHIPPED | OUTPATIENT
Start: 2019-02-11 | End: 2019-03-18 | Stop reason: SDUPTHER

## 2019-02-11 RX ORDER — ALBUTEROL SULFATE 90 UG/1
2 AEROSOL, METERED RESPIRATORY (INHALATION) EVERY 6 HOURS PRN
Qty: 1 INHALER | Refills: 3 | Status: SHIPPED
Start: 2019-02-11 | End: 2020-07-19 | Stop reason: SDUPTHER

## 2019-02-11 RX ORDER — MELOXICAM 7.5 MG/1
7.5 TABLET ORAL DAILY
Qty: 30 TABLET | Refills: 2 | Status: SHIPPED | OUTPATIENT
Start: 2019-02-11 | End: 2019-05-08 | Stop reason: ALTCHOICE

## 2019-02-11 RX ORDER — ESCITALOPRAM OXALATE 10 MG/1
10 TABLET ORAL DAILY
Qty: 90 TABLET | Refills: 1 | Status: SHIPPED | OUTPATIENT
Start: 2019-02-11 | End: 2019-03-18 | Stop reason: SDUPTHER

## 2019-02-11 RX ORDER — ATORVASTATIN CALCIUM 20 MG/1
20 TABLET, FILM COATED ORAL NIGHTLY
Qty: 90 TABLET | Refills: 3 | Status: SHIPPED | OUTPATIENT
Start: 2019-02-11 | End: 2019-05-08 | Stop reason: ALTCHOICE

## 2019-02-11 RX ORDER — BUDESONIDE AND FORMOTEROL FUMARATE DIHYDRATE 160; 4.5 UG/1; UG/1
2 AEROSOL RESPIRATORY (INHALATION) 2 TIMES DAILY
Qty: 3 INHALER | Refills: 1 | Status: SHIPPED | OUTPATIENT
Start: 2019-02-11 | End: 2019-03-18

## 2019-02-11 ASSESSMENT — PATIENT HEALTH QUESTIONNAIRE - PHQ9
3. TROUBLE FALLING OR STAYING ASLEEP: 3
9. THOUGHTS THAT YOU WOULD BE BETTER OFF DEAD, OR OF HURTING YOURSELF: 1
4. FEELING TIRED OR HAVING LITTLE ENERGY: 3
SUM OF ALL RESPONSES TO PHQ QUESTIONS 1-9: 25
SUM OF ALL RESPONSES TO PHQ9 QUESTIONS 1 & 2: 6
7. TROUBLE CONCENTRATING ON THINGS, SUCH AS READING THE NEWSPAPER OR WATCHING TELEVISION: 3
5. POOR APPETITE OR OVEREATING: 3
8. MOVING OR SPEAKING SO SLOWLY THAT OTHER PEOPLE COULD HAVE NOTICED. OR THE OPPOSITE, BEING SO FIGETY OR RESTLESS THAT YOU HAVE BEEN MOVING AROUND A LOT MORE THAN USUAL: 3
1. LITTLE INTEREST OR PLEASURE IN DOING THINGS: 3
2. FEELING DOWN, DEPRESSED OR HOPELESS: 3
SUM OF ALL RESPONSES TO PHQ QUESTIONS 1-9: 25
6. FEELING BAD ABOUT YOURSELF - OR THAT YOU ARE A FAILURE OR HAVE LET YOURSELF OR YOUR FAMILY DOWN: 3
10. IF YOU CHECKED OFF ANY PROBLEMS, HOW DIFFICULT HAVE THESE PROBLEMS MADE IT FOR YOU TO DO YOUR WORK, TAKE CARE OF THINGS AT HOME, OR GET ALONG WITH OTHER PEOPLE: 1

## 2019-03-18 ENCOUNTER — OFFICE VISIT (OUTPATIENT)
Dept: FAMILY MEDICINE CLINIC | Age: 54
End: 2019-03-18
Payer: MEDICARE

## 2019-03-18 VITALS
BODY MASS INDEX: 37.32 KG/M2 | DIASTOLIC BLOOD PRESSURE: 81 MMHG | TEMPERATURE: 97.8 F | HEIGHT: 65 IN | HEART RATE: 81 BPM | OXYGEN SATURATION: 98 % | WEIGHT: 224 LBS | SYSTOLIC BLOOD PRESSURE: 132 MMHG

## 2019-03-18 DIAGNOSIS — M75.01 ADHESIVE CAPSULITIS OF RIGHT SHOULDER: ICD-10-CM

## 2019-03-18 DIAGNOSIS — M19.172 POST-TRAUMATIC ARTHRITIS OF LEFT ANKLE: ICD-10-CM

## 2019-03-18 DIAGNOSIS — F32.1 MODERATE SINGLE CURRENT EPISODE OF MAJOR DEPRESSIVE DISORDER (HCC): ICD-10-CM

## 2019-03-18 DIAGNOSIS — G56.03 BILATERAL CARPAL TUNNEL SYNDROME: ICD-10-CM

## 2019-03-18 DIAGNOSIS — S43.422S SPRAIN OF LEFT ROTATOR CUFF CAPSULE, SEQUELA: ICD-10-CM

## 2019-03-18 DIAGNOSIS — J43.2 CENTRILOBULAR EMPHYSEMA (HCC): Primary | ICD-10-CM

## 2019-03-18 PROCEDURE — G8482 FLU IMMUNIZE ORDER/ADMIN: HCPCS | Performed by: FAMILY MEDICINE

## 2019-03-18 PROCEDURE — 99212 OFFICE O/P EST SF 10 MIN: CPT | Performed by: FAMILY MEDICINE

## 2019-03-18 PROCEDURE — G8417 CALC BMI ABV UP PARAM F/U: HCPCS | Performed by: FAMILY MEDICINE

## 2019-03-18 PROCEDURE — G8427 DOCREV CUR MEDS BY ELIG CLIN: HCPCS | Performed by: FAMILY MEDICINE

## 2019-03-18 PROCEDURE — 99214 OFFICE O/P EST MOD 30 MIN: CPT | Performed by: FAMILY MEDICINE

## 2019-03-18 PROCEDURE — 1036F TOBACCO NON-USER: CPT | Performed by: FAMILY MEDICINE

## 2019-03-18 PROCEDURE — 3017F COLORECTAL CA SCREEN DOC REV: CPT | Performed by: FAMILY MEDICINE

## 2019-03-18 PROCEDURE — 3023F SPIROM DOC REV: CPT | Performed by: FAMILY MEDICINE

## 2019-03-18 PROCEDURE — G8926 SPIRO NO PERF OR DOC: HCPCS | Performed by: FAMILY MEDICINE

## 2019-03-18 RX ORDER — IPRATROPIUM BROMIDE AND ALBUTEROL SULFATE 2.5; .5 MG/3ML; MG/3ML
1 SOLUTION RESPIRATORY (INHALATION) EVERY 6 HOURS PRN
Qty: 360 ML | Refills: 1 | Status: SHIPPED | OUTPATIENT
Start: 2019-03-18

## 2019-03-18 RX ORDER — ESCITALOPRAM OXALATE 10 MG/1
10 TABLET ORAL DAILY
Qty: 90 TABLET | Refills: 1 | Status: SHIPPED | OUTPATIENT
Start: 2019-03-18 | End: 2019-05-08 | Stop reason: ALTCHOICE

## 2019-03-22 ENCOUNTER — TELEPHONE (OUTPATIENT)
Dept: FAMILY MEDICINE CLINIC | Age: 54
End: 2019-03-22

## 2019-03-22 DIAGNOSIS — R91.8 PULMONARY NODULES: Primary | Chronic | ICD-10-CM

## 2019-03-23 ENCOUNTER — HOSPITAL ENCOUNTER (OUTPATIENT)
Age: 54
Discharge: HOME | End: 2019-03-23
Payer: MEDICARE

## 2019-03-23 DIAGNOSIS — E78.00 PURE HYPERCHOLESTEROLEMIA: ICD-10-CM

## 2019-03-23 LAB
ALBUMIN SERPL-MCNC: 4.3 G/DL (ref 3.5–5.2)
ALP BLD-CCNC: 119 U/L (ref 35–104)
ALT SERPL-CCNC: 22 U/L (ref 0–32)
ANION GAP SERPL CALCULATED.3IONS-SCNC: 12 MMOL/L (ref 7–16)
AST SERPL-CCNC: 18 U/L (ref 0–31)
BILIRUB SERPL-MCNC: 0.4 MG/DL (ref 0–1.2)
BUN BLDV-MCNC: 11 MG/DL (ref 6–20)
CALCIUM SERPL-MCNC: 9.2 MG/DL (ref 8.6–10.2)
CHLORIDE BLD-SCNC: 101 MMOL/L (ref 98–107)
CHOLESTEROL, TOTAL: 182 MG/DL (ref 0–199)
CO2: 26 MMOL/L (ref 22–29)
CREAT SERPL-MCNC: 0.6 MG/DL (ref 0.5–1)
GFR AFRICAN AMERICAN: >60
GFR NON-AFRICAN AMERICAN: >60 ML/MIN/1.73
GLUCOSE BLD-MCNC: 72 MG/DL (ref 74–99)
HBA1C MFR BLD: 5.6 % (ref 4–5.6)
HCT VFR BLD CALC: 47.3 % (ref 34–48)
HDLC SERPL-MCNC: 43 MG/DL
HEMOGLOBIN: 15.6 G/DL (ref 11.5–15.5)
LDL CHOLESTEROL CALCULATED: 114 MG/DL (ref 0–99)
MCH RBC QN AUTO: 31.8 PG (ref 26–35)
MCHC RBC AUTO-ENTMCNC: 33 % (ref 32–34.5)
MCV RBC AUTO: 96.3 FL (ref 80–99.9)
PDW BLD-RTO: 12.9 FL (ref 11.5–15)
PLATELET # BLD: 354 E9/L (ref 130–450)
PMV BLD AUTO: 8.7 FL (ref 7–12)
POTASSIUM SERPL-SCNC: 4.3 MMOL/L (ref 3.5–5)
RBC # BLD: 4.91 E12/L (ref 3.5–5.5)
SODIUM BLD-SCNC: 139 MMOL/L (ref 132–146)
TOTAL PROTEIN: 7.5 G/DL (ref 6.4–8.3)
TRIGL SERPL-MCNC: 124 MG/DL (ref 0–149)
TSH SERPL DL<=0.05 MIU/L-ACNC: 0.76 UIU/ML (ref 0.27–4.2)
VLDLC SERPL CALC-MCNC: 25 MG/DL
WBC # BLD: 8.1 E9/L (ref 4.5–11.5)

## 2019-03-23 PROCEDURE — 80061 LIPID PANEL: CPT

## 2019-03-23 PROCEDURE — 80053 COMPREHEN METABOLIC PANEL: CPT

## 2019-03-23 PROCEDURE — 84443 ASSAY THYROID STIM HORMONE: CPT

## 2019-03-23 PROCEDURE — 83036 HEMOGLOBIN GLYCOSYLATED A1C: CPT

## 2019-03-23 PROCEDURE — 85027 COMPLETE CBC AUTOMATED: CPT

## 2019-03-23 PROCEDURE — 36415 COLL VENOUS BLD VENIPUNCTURE: CPT

## 2019-04-02 ENCOUNTER — HOSPITAL ENCOUNTER (OUTPATIENT)
Dept: CT IMAGING | Age: 54
Discharge: HOME | End: 2019-04-04

## 2019-04-02 DIAGNOSIS — R91.8 PULMONARY NODULES: Chronic | ICD-10-CM

## 2019-04-02 PROCEDURE — 71260 CT THORAX DX C+: CPT

## 2019-04-02 PROCEDURE — 6360000004 HC RX CONTRAST MEDICATION: Performed by: RADIOLOGY

## 2019-04-02 PROCEDURE — 2580000003 HC RX 258: Performed by: RADIOLOGY

## 2019-04-02 RX ORDER — SODIUM CHLORIDE 0.9 % (FLUSH) 0.9 %
10 SYRINGE (ML) INJECTION ONCE
Status: COMPLETED | OUTPATIENT
Start: 2019-04-02 | End: 2019-04-02

## 2019-04-02 RX ADMIN — IOPAMIDOL 90 ML: 755 INJECTION, SOLUTION INTRAVENOUS at 08:20

## 2019-04-02 RX ADMIN — Medication 10 ML: at 08:20

## 2019-04-03 ENCOUNTER — TELEPHONE (OUTPATIENT)
Dept: FAMILY MEDICINE CLINIC | Age: 54
End: 2019-04-03

## 2019-04-03 DIAGNOSIS — E04.1 THYROID NODULE: Primary | ICD-10-CM

## 2019-04-03 NOTE — TELEPHONE ENCOUNTER
I called patient to give results of CT. Called once, left VM to call us back. Called again, and she answered. Discussed lung CT findings, stable overall. She follows with pulm as well. Thyroid nodule seen on CT, discussed need to investigate further, and she understands. Will order thyroid US to follow up. Questions answered. She will call with further questions or concerns.

## 2019-04-15 ENCOUNTER — OFFICE VISIT (OUTPATIENT)
Dept: FAMILY MEDICINE CLINIC | Age: 54
End: 2019-04-15

## 2019-04-15 VITALS
OXYGEN SATURATION: 98 % | BODY MASS INDEX: 36.65 KG/M2 | SYSTOLIC BLOOD PRESSURE: 128 MMHG | WEIGHT: 220 LBS | DIASTOLIC BLOOD PRESSURE: 79 MMHG | TEMPERATURE: 98.1 F | HEIGHT: 65 IN | HEART RATE: 76 BPM

## 2019-04-15 DIAGNOSIS — J43.2 CENTRILOBULAR EMPHYSEMA (HCC): ICD-10-CM

## 2019-04-15 DIAGNOSIS — G56.03 BILATERAL CARPAL TUNNEL SYNDROME: ICD-10-CM

## 2019-04-15 DIAGNOSIS — K21.9 GASTROESOPHAGEAL REFLUX DISEASE, ESOPHAGITIS PRESENCE NOT SPECIFIED: Primary | ICD-10-CM

## 2019-04-15 DIAGNOSIS — E04.1 THYROID NODULE: ICD-10-CM

## 2019-04-15 DIAGNOSIS — M19.172 POST-TRAUMATIC ARTHRITIS OF LEFT ANKLE: ICD-10-CM

## 2019-04-15 PROCEDURE — 99213 OFFICE O/P EST LOW 20 MIN: CPT | Performed by: FAMILY MEDICINE

## 2019-04-15 PROCEDURE — 99212 OFFICE O/P EST SF 10 MIN: CPT | Performed by: FAMILY MEDICINE

## 2019-04-15 RX ORDER — FAMOTIDINE 20 MG/1
20 TABLET, FILM COATED ORAL 2 TIMES DAILY
Qty: 180 TABLET | Refills: 1 | Status: SHIPPED | OUTPATIENT
Start: 2019-04-15

## 2019-04-15 NOTE — PROGRESS NOTES
CC:  Follow up COPD/restrictive lung disease    HPI:  48 y.o. female presents for follow up. COPD/restrictive lung disease. Doing breathing treatments at night, had been less frequent, but now more often in the evenings, 2-3 times per week. Worse with humidity. Chronic cough, improved after quitting smoking last May, but now increasing. Deep cough, frequent throughout the day. Not sure when she will see pulm again. No longer on nicotine replacement. Acid in stomach couple times per week, better with milk or Mylanta. Ghost peppers; likes spicy foods, which worsens symptoms. No M/H, no bleeding. No weight change. Numbness and tingling into bilateral hands, palm side, 1st three digits. Unchanged. Braces at night have no helped. Did not use braces during work, as she is not allowed to. Shakes hands to help symptoms. Has thyroid US in 2 days. Depression, stable. No new symptoms or concerns. Current medication controls symptoms. Does not express SI or HI.       Patient Active Problem List    Diagnosis Date Noted    Thyroid nodule 04/03/2019    Tinnitus of both ears 02/11/2019    Fall down stairs 12/10/2018    PMB (postmenopausal bleeding) 09/06/2018    Shortness of breath     Pulmonary nodules 05/16/2018    Bee sting allergy 05/04/2018    Post-traumatic arthritis of left ankle 04/24/2018    Bilateral carpal tunnel syndrome 03/26/2018    Moderate single current episode of major depressive disorder ([facility] [address] 03/26/2018    Biceps tendonitis of both shoulders 05/12/2016    Right shoulder pain 11/05/2013    Hidradenitis suppurativa 04/17/2012    Intertrigo 04/17/2012    Onychomycosis 04/17/2012    Adhesive capsulitis of right shoulder     Hypertension     Hyperlipidemia     Tobacco abuse     COPD (chronic obstructive pulmonary disease) (HCC)     Allergic rhinosinusitis     Rotator cuff (capsule) sprain 07/01/2011       Current Outpatient Medications on File Prior to Visit   Medication Sig Dispense Refill    escitalopram (LEXAPRO) 10 MG tablet Take 1 tablet by mouth daily 90 tablet 1    tiotropium (SPIRIVA HANDIHALER) 18 MCG inhalation capsule Inhale 1 capsule into the lungs daily 90 capsule 1    ipratropium-albuterol (DUONEB) 0.5-2.5 (3) MG/3ML SOLN nebulizer solution Inhale 3 mLs into the lungs every 6 hours as needed for Shortness of Breath 360 mL 1    meloxicam (MOBIC) 7.5 MG tablet Take 1 tablet by mouth daily 30 tablet 2    albuterol sulfate HFA (PROVENTIL HFA) 108 (90 Base) MCG/ACT inhaler Inhale 2 puffs into the lungs every 6 hours as needed for Wheezing 1 Inhaler 3    atorvastatin (LIPITOR) 20 MG tablet Take 1 tablet by mouth nightly 90 tablet 3    loratadine (CLARITIN) 10 MG tablet Take 1 tablet by mouth daily 90 tablet 3    EPINEPHrine (EPIPEN 2-CAMILLE) 0.3 MG/0.3ML SOAJ injection Use as directed for allergic reaction 2 each 1    acetaminophen (TYLENOL) 500 MG tablet Take 500 mg by mouth every 4 hours as needed for Pain       No current facility-administered medications on file prior to visit. Allergies   Allergen Reactions    Bee Venom     Pcn [Penicillins] Nausea And Vomiting       Social History     Tobacco Use    Smoking status: Former Smoker     Packs/day: 1.00     Years: 36.00     Pack years: 36.00     Types: Cigarettes     Last attempt to quit: 2018     Years since quittin.9    Smokeless tobacco: Never Used   Substance Use Topics    Alcohol use: No     Alcohol/week: 0.0 oz     Comment: Previous alcoholism, 24 years clean.  Drug use: No       ROS:   Review of Systems - as above     Physical Exam:    VS:  Blood pressure 128/79, pulse 76, temperature 98.1 °F (36.7 °C), temperature source Oral, height 5' 5\" (1.651 m), weight 220 lb (99.8 kg), last menstrual period 2012, SpO2 98 %, not currently breastfeeding.     General Appearance:  awake, alert, oriented, in no acute distress and well developed, well nourished  Head/face: NCAT  Eyes:  EOMI and Sclera nonicteric  Neck:  neck- supple, no mass, non-tender  Lungs:  Normal expansion. Clear to auscultation. No rales, rhonchi, or wheezing. Heart:  Heart sounds are normal.  Regular rate and rhythm without murmur, gallop or rub. Abdomen:  Soft, NT, ND   Extremities: Extremities warm to touch, pink, with no edema. , pulses present in all extremities and left ankle with tenderness to light palpation, limited ROM is stable. Positive bilateral Phalen's. Psych: appropriate affect, coherent thought processes, no flight of ideas, no delusions or hallucinations apparent, speech not pressured, no suicidal or homicidal ideation or intent, appropriate insight and judgment intact    Assessments:      Diagnosis Orders   1. Gastroesophageal reflux disease, esophagitis presence not specified     2. Centrilobular emphysema ([facility] [address]     3. Thyroid nodule     4. Post-traumatic arthritis of left ankle     5. Bilateral carpal tunnel syndrome         Plans:    As Above. Please see Patient Instructions for further counseling and information given. RTO 3 months or sooner prn. Lifestyle modifications for ANTELMO symptoms. Advised braces for CTS for now; referral to PM&R and NCV pending. She understands. Call if symptoms worsen or persist or new symptoms develop. Await thyroid US. Advised to follow up with pulm as directed, and she expressed understanding. Advised following with a podiatrist of her choice to discuss further evaluation and treatment of chronic ankle pain. She understands. Call with further symptoms or concerns. Advised to please be adherent to the treatment plans discussed today, and please call with any questions or concerns, letting the office know of any reasons that the plans may not be followed. The risks of untreated conditions include worsening illness, injury, disability, and possibly, death.  Please call if symptoms change in any way, worsen, or

## 2019-04-17 ENCOUNTER — HOSPITAL ENCOUNTER (OUTPATIENT)
Dept: ULTRASOUND IMAGING | Age: 54
Discharge: HOME | End: 2019-04-19

## 2019-04-17 ENCOUNTER — TELEPHONE (OUTPATIENT)
Dept: FAMILY MEDICINE CLINIC | Age: 54
End: 2019-04-17

## 2019-04-17 DIAGNOSIS — E04.1 THYROID NODULE: ICD-10-CM

## 2019-04-17 DIAGNOSIS — E04.1 THYROID NODULE: Primary | ICD-10-CM

## 2019-04-17 PROCEDURE — 76536 US EXAM OF HEAD AND NECK: CPT

## 2019-04-17 NOTE — TELEPHONE ENCOUNTER
Please let her know that her US showed several thyroid nodules. It is recommended that she have a biopsy of at least one of the nodules to evaluate them further. The biopsy will be done by an interventional radiologist who will use ultrasound to insert a needle into the correct spot to take a sample of the thyroid. I will order the test.  Please call us with questions or concerns. She should avoid Mobic for about one week before this test if possible. Thank you!

## 2019-05-08 ENCOUNTER — OFFICE VISIT (OUTPATIENT)
Dept: FAMILY MEDICINE CLINIC | Age: 54
End: 2019-05-08

## 2019-05-08 VITALS
TEMPERATURE: 98.3 F | DIASTOLIC BLOOD PRESSURE: 84 MMHG | HEIGHT: 65 IN | HEART RATE: 97 BPM | OXYGEN SATURATION: 96 % | RESPIRATION RATE: 16 BRPM | SYSTOLIC BLOOD PRESSURE: 130 MMHG | BODY MASS INDEX: 37.49 KG/M2 | WEIGHT: 225 LBS

## 2019-05-08 DIAGNOSIS — Z59.89 DOES NOT HAVE HEALTH INSURANCE: ICD-10-CM

## 2019-05-08 DIAGNOSIS — F32.2 CURRENT SEVERE EPISODE OF MAJOR DEPRESSIVE DISORDER WITHOUT PSYCHOTIC FEATURES WITHOUT PRIOR EPISODE (HCC): ICD-10-CM

## 2019-05-08 DIAGNOSIS — E78.00 PURE HYPERCHOLESTEROLEMIA: ICD-10-CM

## 2019-05-08 DIAGNOSIS — Z13.31 POSITIVE DEPRESSION SCREENING: ICD-10-CM

## 2019-05-08 PROCEDURE — G8431 POS CLIN DEPRES SCRN F/U DOC: HCPCS | Performed by: STUDENT IN AN ORGANIZED HEALTH CARE EDUCATION/TRAINING PROGRAM

## 2019-05-08 PROCEDURE — 99213 OFFICE O/P EST LOW 20 MIN: CPT | Performed by: STUDENT IN AN ORGANIZED HEALTH CARE EDUCATION/TRAINING PROGRAM

## 2019-05-08 PROCEDURE — 99212 OFFICE O/P EST SF 10 MIN: CPT | Performed by: STUDENT IN AN ORGANIZED HEALTH CARE EDUCATION/TRAINING PROGRAM

## 2019-05-08 RX ORDER — PRAVASTATIN SODIUM 40 MG
40 TABLET ORAL DAILY
Qty: 30 TABLET | Refills: 0 | Status: SHIPPED | OUTPATIENT
Start: 2019-05-08 | End: 2019-08-06 | Stop reason: SDUPTHER

## 2019-05-08 RX ORDER — CITALOPRAM 20 MG/1
20 TABLET ORAL DAILY
Qty: 30 TABLET | Refills: 0 | Status: SHIPPED | OUTPATIENT
Start: 2019-05-08 | End: 2019-06-10 | Stop reason: SDUPTHER

## 2019-05-08 SDOH — ECONOMIC STABILITY - INCOME SECURITY: OTHER PROBLEMS RELATED TO HOUSING AND ECONOMIC CIRCUMSTANCES: Z59.89

## 2019-05-08 NOTE — PROGRESS NOTES
CUFF REPAIR  10/23/2012    Dr. Ani Donaldson at [facility] [place]. Family History   Problem Relation Age of Onset   [facility] Cancer Mother 37        CA in shoulder, lungs    High Blood Pressure Mother     Heart Disease Mother     Mental Illness Mother     High Cholesterol Mother     Arthritis Mother     Heart Attack Mother 43    Cancer Father 64        Throat CA    Substance Abuse Father         Alcohol, throat CA    High Blood Pressure Father     High Cholesterol Father     Diabetes Maternal Aunt        Social History     Socioeconomic History    Marital status: Legally      Spouse name: Not on file    Number of children: Not on file    Years of education: Not on file    Highest education level: Not on file   Occupational History    Occupation: OFF WORK -    Social Needs    Financial resource strain: Not on file    Food insecurity:     Worry: Not on file     Inability: Not on file    Transportation needs:     Medical: Not on file     Non-medical: Not on file   Tobacco Use    Smoking status: Former Smoker     Packs/day: 1.00     Years: 36.00     Pack years: 36.00     Types: Cigarettes     Last attempt to quit: 2018     Years since quittin.9    Smokeless tobacco: Never Used   Substance and Sexual Activity    Alcohol use: No     Alcohol/week: 0.0 oz     Comment: Previous alcoholism, 24 years clean.       Drug use: No    Sexual activity: Not Currently     Partners: Male   Lifestyle    Physical activity:     Days per week: Not on file     Minutes per session: Not on file    Stress: Not on file   Relationships    Social connections:     Talks on phone: Not on file     Gets together: Not on file     Attends Taoism service: Not on file     Active member of club or organization: Not on file     Attends meetings of clubs or organizations: Not on file     Relationship status: Not on file    Intimate partner violence:     Fear of current or ex partner: Not on file     Emotionally abused: Not on file     Physically abused: Not on file     Forced sexual activity: Not on file   Other Topics Concern    Not on file   Social History Narrative    Not on file       Current Outpatient Medications   Medication Sig Dispense Refill    tiotropium (SPIRIVA HANDIHALER) 18 MCG inhalation capsule Inhale 1 capsule into the lungs daily 90 capsule 1    ipratropium-albuterol (DUONEB) 0.5-2.5 (3) MG/3ML SOLN nebulizer solution Inhale 3 mLs into the lungs every 6 hours as needed for Shortness of Breath 360 mL 1    albuterol sulfate HFA (PROVENTIL HFA) 108 (90 Base) MCG/ACT inhaler Inhale 2 puffs into the lungs every 6 hours as needed for Wheezing 1 Inhaler 3    famotidine (PEPCID) 20 MG tablet Take 1 tablet by mouth 2 times daily 180 tablet 1    escitalopram (LEXAPRO) 10 MG tablet Take 1 tablet by mouth daily 90 tablet 1    meloxicam (MOBIC) 7.5 MG tablet Take 1 tablet by mouth daily 30 tablet 2    atorvastatin (LIPITOR) 20 MG tablet Take 1 tablet by mouth nightly 90 tablet 3    loratadine (CLARITIN) 10 MG tablet Take 1 tablet by mouth daily 90 tablet 3    EPINEPHrine (EPIPEN 2-CAMILLE) 0.3 MG/0.3ML SOAJ injection Use as directed for allergic reaction 2 each 1    acetaminophen (TYLENOL) 500 MG tablet Take 500 mg by mouth every 4 hours as needed for Pain       No current facility-administered medications for this visit. Allergies: Bee venom and Pcn [penicillins]    Review of Systems   Constitutional: Negative for chills, diaphoresis, fatigue and fever. Respiratory: Negative for cough and shortness of breath. Cardiovascular: Negative for chest pain and palpitations. Psychiatric/Behavioral: Positive for dysphoric mood. Negative for sleep disturbance and suicidal ideas. The patient is nervous/anxious.         /84 (Site: Left Upper Arm, Position: Sitting, Cuff Size: Medium Adult)   Pulse 97   Temp 98.3 °F (36.8 °C) (Oral)   Resp 16   Ht 5' 5\" (1.651 m)   Wt 225 lb (102.1 kg)   LMP 09/13/2012 SpO2 96%   BMI 37.44 kg/m²     Physical Exam   Constitutional: She appears well-developed and well-nourished. No distress. Cardiovascular: Normal rate, regular rhythm, normal heart sounds and intact distal pulses. Exam reveals no gallop and no friction rub. No murmur heard. Pulmonary/Chest: Effort normal and breath sounds normal. She has no wheezes. She has no rales. Abdominal: Soft. Bowel sounds are normal. She exhibits no distension and no mass. There is no tenderness. Musculoskeletal: She exhibits no edema. Skin: She is not diaphoretic. Assessment and Plan:  Artemio Best was seen today for other. Diagnoses and all orders for this visit:    Positive depression screening  -     Positive Screen for Clinical Depression with a Documented Follow-up Plan   On the basis of positive PHQ-9 screening ( ), the following plan was implemented: change escitalopram to citalopram for better affordability. Patient will follow-up in 4 week(s) with PCP. Current severe episode of major depressive disorder without psychotic features without prior episode ([facility] [address]  Will switch patient to citalopram for better affordability. Patient will call if still not affordable. Will otherwise follow up in 2-4 weeks for re-evaluation of mood. -     Positive Screen for Clinical Depression with a Documented Follow-up Plan   -     citalopram (CELEXA) 20 MG tablet; Take 1 tablet by mouth daily    Pure hypercholesterolemia  Will switch to pravastatin for better affordability. Will re-check lipid panel at next visit. -     pravastatin (PRAVACHOL) 40 MG tablet; Take 1 tablet by mouth daily    Does not have health insurance  Will consult social work to help connect patient to resources that will help her obtain affordable insurance. Alyssa Beth LS ()      Call or go to ED immediately if symptoms worsen or persist.  Return in about 1 month (around 6/5/2019) for Exam following depression, anxiety. , or sooner if necessary. All questions answered. Bryce Kraus MD  Family Medicine Resident, PGY-3

## 2019-05-08 NOTE — PROGRESS NOTES
S: 48 y.o. female with worsening depression symptoms. Has not been able to afford Lexapro or atorvastatin. Using OTC loratadine and analgesics. Has plenty of inhalers. Working with [employer], looking for a new job, but can't leave. CTS symptoms have been worse. Awaiting thyroid biopsy results. No SI or HI.    O: VS: /84 (Site: Left Upper Arm, Position: Sitting, Cuff Size: Medium Adult)   Pulse 97   Temp 98.3 °F (36.8 °C) (Oral)   Resp 16   Ht 5' 5\" (1.651 m)   Wt 225 lb (102.1 kg)   LMP 09/13/2012   SpO2 96%   BMI 37.44 kg/m²    General: NAD, appropriate affect and grooming   CV:  RRR, no gallops, rubs, or murmurs   Resp: CTAB   Abd:  Soft, nontender   Ext:  No edema  Impression: Depression  Plan: Plan to start citalopram.  Financial resources. Consider pravastatin. Close follow up in the next 2-4 weeks or sooner prn. Await thyroid biopsy and NCV. Braces for wrists. Attending Physician Statement  I have discussed the case, including pertinent history and exam findings with the resident. I agree with the documented assessment and plan.

## 2019-05-09 ASSESSMENT — ENCOUNTER SYMPTOMS
COUGH: 0
SHORTNESS OF BREATH: 0

## 2019-05-23 ENCOUNTER — HOSPITAL ENCOUNTER (OUTPATIENT)
Dept: NEUROLOGY | Age: 54
Discharge: HOME | End: 2019-05-23

## 2019-05-23 VITALS — WEIGHT: 220 LBS | HEIGHT: 65 IN | BODY MASS INDEX: 36.65 KG/M2

## 2019-05-23 PROCEDURE — 95913 NRV CNDJ TEST 13/> STUDIES: CPT

## 2019-05-23 PROCEDURE — 95912 NRV CNDJ TEST 11-12 STUDIES: CPT | Performed by: PSYCHIATRY & NEUROLOGY

## 2019-05-24 NOTE — PROCEDURES
[address]   Electrodiagnostic Laboratory  [facility]        Full Name: Kyree Barraza Gender: Female  MRN: [id number] YOB: 1965  Location[de-identified] Outpt. Visit Date: 5/23/2019 10:03  Age: 48 Years 10 Months Old  Examining Physician: Dr. Coral Ferrer  Referring Physician: Dr. Bill Mayorga  Technician: Tona Situ   Height: 5 feet 5 inch  Weight: 220 lbs  Notes: Bilat. CTS G56.03      Motor NCS      Nerve / Sites Lat. Amplitude Distance Lat Diff Velocity Temp. Amp. 1-2    ms mV cm ms m/s °C %   R Median - APB      Wrist 4.53 11.3 8   34.4 100      Elbow 8.13 10.4 20 3.59 56 34.4 91.6   L Median - APB      Wrist 4.58 9.7 8   33.1 100      Elbow 7.97 8.9 19 3.39 56 33.1 91.4   R Ulnar - ADM      Wrist 2.50 12.4 8   33.9 100      B. Elbow 5.21 10.7 17 2.71 63 33.5 86.5      A. Elbow 6.46 10.7 10 1.25 80 33.4 86.5   L Ulnar - ADM      Wrist 2.40 10.1 8   33 100      B. Elbow 5.05 9.6 17 2.66 64 33.1 95.2      A. Elbow 6.41 9.5 10 1.35 74 33.1 94.6       Sensory NCS      Nerve / Sites Onset Lat Peak Lat PP Amp Distance Velocity Temp.    ms ms µV cm m/s °C   L Median - Digit II (Antidromic)      Mid Palm 1.51 2.40 14.8 7 46 33.5      Wrist 3.75 4.48 13.5 14 37 33.5   R Median - Digit II (Antidromic)      Mid Palm 1.25 1.88 20.4 7 56 33.6      Wrist 3.91 4.58 15.5 14 36 33.8   L Ulnar - Digit V (Antidromic)      Wrist 2.34 2.92 24.9 14 60 33.5   R Ulnar - Digit V (Antidromic)      Wrist 2.14 2.92 25.3 14 66 33.5   R Radial - Anatomical snuff box (Forearm)      Forearm 1.67 2.24 27.6 10 60 33.3   L Radial - Anatomical snuff box (Forearm)      Forearm 1.56 2.03 30.5 10 64 33.1       Combined Sensory Index      Nerve / Sites Rec. Site Peak Lat NP Amp PP Amp Segments Peak Diff Temp.      ms µV µV  ms °C   R Median - CSI      Median Thumb 4.48 2.5 24.3 Median - Radial 2.24 33.5      Radial Thumb 2.24 9.8 10.8 Median - Ulnar 1.20 33.6      Median Ring 4.43 5.6 10.7 Median palm - Ulnar palm 1.25 34 Ulnar Ring 3.23 7.5 12.3         Median palm Wrist 2.86 25.8 35.1         Ulnar palm Wrist 1.61 17.5 19.8         CSI     CSI 4.69    L Median - CSI      Median Thumb 4.53 1.4 14.7 Median - Radial 2.24 33.4      Radial Thumb 2.29 7.6 9.3 Median - Ulnar 1.46 33.4      Median Ring 4.58 8.9 14.0 Median palm - Ulnar palm 1.67 33.3      Ulnar Ring 3.13 9.2 9.8         Median palm Wrist 3.18 26.2 27.9         Ulnar palm Wrist 1.51 22.6 19.9         CSI     CSI 5.36            F  Wave      Nerve F Lat M Lat F-M Lat    ms ms ms   R Median - APB 27.4 4.5 22.9   R Ulnar - ADM 24.8 2.4 22.3   L Median - APB 27.6 4.6 23.0   L Ulnar - ADM 24.9 2.6 22.4       Nerve conduction studies in both arms disclosed the following abnormalities---minimal prolongation of the distal motor latencies of both median nerves at the wrists. Both median nerve distal motor latencies were also slightly delayed. Both median nerve CSI values were markedly elevated. The F wave latencies of both median nerves were also prolonged. These findings were compared to the referential values in this laboratory, available upon request.    Nerve conduction studies in both arms disclosed evidence diagnostic bilateral median neuropathies at/or distal to the wrists---of moderate severity. These findings were consistent with carpal tunnel syndrome. There were no other peripheral neuropathies. Motor radiculopathies or intracanalicular lesions cannot be determined unless needle testing be performed. Sensory radiculopathies cannot be evaluated by electrodiagnostic means. Clinical correlation was highly advised.

## 2019-06-10 ENCOUNTER — OFFICE VISIT (OUTPATIENT)
Dept: FAMILY MEDICINE CLINIC | Age: 54
End: 2019-06-10

## 2019-06-10 ENCOUNTER — HOSPITAL ENCOUNTER (OUTPATIENT)
Age: 54
Discharge: HOME | End: 2019-06-12

## 2019-06-10 VITALS
DIASTOLIC BLOOD PRESSURE: 80 MMHG | HEIGHT: 65 IN | BODY MASS INDEX: 36.82 KG/M2 | RESPIRATION RATE: 16 BRPM | HEART RATE: 75 BPM | SYSTOLIC BLOOD PRESSURE: 135 MMHG | OXYGEN SATURATION: 94 % | TEMPERATURE: 98 F | WEIGHT: 221 LBS

## 2019-06-10 DIAGNOSIS — F32.2 CURRENT SEVERE EPISODE OF MAJOR DEPRESSIVE DISORDER WITHOUT PSYCHOTIC FEATURES WITHOUT PRIOR EPISODE (HCC): ICD-10-CM

## 2019-06-10 DIAGNOSIS — E78.00 PURE HYPERCHOLESTEROLEMIA: ICD-10-CM

## 2019-06-10 DIAGNOSIS — G56.03 BILATERAL CARPAL TUNNEL SYNDROME: ICD-10-CM

## 2019-06-10 LAB
CHOLESTEROL, TOTAL: 235 MG/DL (ref 0–199)
HDLC SERPL-MCNC: 38 MG/DL
LDL CHOLESTEROL CALCULATED: 161 MG/DL (ref 0–99)
TRIGL SERPL-MCNC: 179 MG/DL (ref 0–149)
VLDLC SERPL CALC-MCNC: 36 MG/DL

## 2019-06-10 PROCEDURE — 99213 OFFICE O/P EST LOW 20 MIN: CPT | Performed by: STUDENT IN AN ORGANIZED HEALTH CARE EDUCATION/TRAINING PROGRAM

## 2019-06-10 PROCEDURE — 36415 COLL VENOUS BLD VENIPUNCTURE: CPT | Performed by: FAMILY MEDICINE

## 2019-06-10 PROCEDURE — 80061 LIPID PANEL: CPT

## 2019-06-10 PROCEDURE — 99212 OFFICE O/P EST SF 10 MIN: CPT | Performed by: STUDENT IN AN ORGANIZED HEALTH CARE EDUCATION/TRAINING PROGRAM

## 2019-06-10 PROCEDURE — 36415 COLL VENOUS BLD VENIPUNCTURE: CPT

## 2019-06-10 RX ORDER — CITALOPRAM 20 MG/1
20 TABLET ORAL DAILY
Qty: 30 TABLET | Refills: 3 | Status: SHIPPED | OUTPATIENT
Start: 2019-06-10 | End: 2019-08-06 | Stop reason: SDUPTHER

## 2019-06-10 ASSESSMENT — ENCOUNTER SYMPTOMS
SHORTNESS OF BREATH: 0
COUGH: 0

## 2019-06-10 NOTE — PROGRESS NOTES
 Heart Attack Mother 43    Cancer Father 64        Throat CA    Substance Abuse Father         Alcohol, throat CA    High Blood Pressure Father     High Cholesterol Father     Diabetes Maternal Aunt        Social History     Socioeconomic History    Marital status: Legally      Spouse name: Not on file    Number of children: Not on file    Years of education: Not on file    Highest education level: Not on file   Occupational History    Occupation: OFF WORK -    Social Needs    Financial resource strain: Not on file    Food insecurity:     Worry: Not on file     Inability: Not on file    Transportation needs:     Medical: Not on file     Non-medical: Not on file   Tobacco Use    Smoking status: Former Smoker     Packs/day: 1.00     Years: 36.00     Pack years: 36.00     Types: Cigarettes     Last attempt to quit: 2018     Years since quittin.0    Smokeless tobacco: Never Used   Substance and Sexual Activity    Alcohol use: No     Alcohol/week: 0.0 oz     Comment: Previous alcoholism, 24 years clean.       Drug use: No    Sexual activity: Not Currently     Partners: Male   Lifestyle    Physical activity:     Days per week: Not on file     Minutes per session: Not on file    Stress: Not on file   Relationships    Social connections:     Talks on phone: Not on file     Gets together: Not on file     Attends Orthodoxy service: Not on file     Active member of club or organization: Not on file     Attends meetings of clubs or organizations: Not on file     Relationship status: Not on file    Intimate partner violence:     Fear of current or ex partner: Not on file     Emotionally abused: Not on file     Physically abused: Not on file     Forced sexual activity: Not on file   Other Topics Concern    Not on file   Social History Narrative    Not on file       Current Outpatient Medications   Medication Sig Dispense Refill    citalopram (CELEXA) 20 MG tablet Take 1 tablet by mouth daily 30 tablet 0    pravastatin (PRAVACHOL) 40 MG tablet Take 1 tablet by mouth daily 30 tablet 0    famotidine (PEPCID) 20 MG tablet Take 1 tablet by mouth 2 times daily 180 tablet 1    tiotropium (SPIRIVA HANDIHALER) 18 MCG inhalation capsule Inhale 1 capsule into the lungs daily 90 capsule 1    ipratropium-albuterol (DUONEB) 0.5-2.5 (3) MG/3ML SOLN nebulizer solution Inhale 3 mLs into the lungs every 6 hours as needed for Shortness of Breath 360 mL 1    albuterol sulfate HFA (PROVENTIL HFA) 108 (90 Base) MCG/ACT inhaler Inhale 2 puffs into the lungs every 6 hours as needed for Wheezing 1 Inhaler 3    loratadine (CLARITIN) 10 MG tablet Take 1 tablet by mouth daily 90 tablet 3    EPINEPHrine (EPIPEN 2-CAMILLE) 0.3 MG/0.3ML SOAJ injection Use as directed for allergic reaction 2 each 1    acetaminophen (TYLENOL) 500 MG tablet Take 500 mg by mouth every 4 hours as needed for Pain       No current facility-administered medications for this visit. Allergies: Bee venom and Pcn [penicillins]    Review of Systems   Constitutional: Negative for chills, diaphoresis, fatigue and fever. Respiratory: Negative for cough and shortness of breath. Cardiovascular: Negative for chest pain and palpitations. Neurological: Positive for numbness (bilateral hands). Negative for weakness. Psychiatric/Behavioral: Negative for dysphoric mood, sleep disturbance and suicidal ideas. The patient is not nervous/anxious. /80 (Site: Right Upper Arm, Position: Sitting, Cuff Size: Large Adult)   Pulse 75   Temp 98 °F (36.7 °C) (Oral)   Resp 16   Ht 5' 5\" (1.651 m)   Wt 221 lb (100.2 kg)   LMP 09/13/2012   SpO2 94%   BMI 36.78 kg/m²     Physical Exam   Constitutional: She appears well-developed and well-nourished. No distress. Cardiovascular: Normal rate, regular rhythm, normal heart sounds and intact distal pulses. Exam reveals no gallop and no friction rub. No murmur heard.   Pulmonary/Chest: Effort normal and breath sounds normal. She has no wheezes. She has no rales. Musculoskeletal: She exhibits no edema. Skin: She is not diaphoretic. Assessment and Plan:  Ramses Whittaker was seen today for depression and medication refill. Diagnoses and all orders for this visit:    Bilateral carpal tunnel syndrome  Discussed options with patient, patient prefers surgical consultation at this time. Patient prefers not to try carpal tunnel injections, as she has had other injections in multiple joints without help. Referral placed to hand surgeon. -     Vania Lynn MD, Orthopaedics and Rehabilitation, [facility]    Current severe episode of major depressive disorder without psychotic features without prior episode [facility])  Patient doing well on citalopram.  New script with refills sent today. -     citalopram (CELEXA) 20 MG tablet; Take 1 tablet by mouth daily    Pure hypercholesterolemia  Will re-check lipid panel today to see how patient's lipids are doing on pravastatin. Will modify therapy if indicated, otherwise will continue pravastatin therapy. -     LIPID PANEL; Future      Call or go to ED immediately if symptoms worsen or persist.  Return in about 2 months (around 8/10/2019) for Health Maintenance Exam., or sooner if necessary. Patient already scheduled to see her PCP in early August.    All questions answered. Bryce Ocampo MD  Family Medicine Resident, PGY-3

## 2019-06-10 NOTE — PROGRESS NOTES
Attending Physician Statement    S:   Chief Complaint   Patient presents with    Depression    Medication Refill      Depression, hyperlipidemia. Taking citalopram and pravastatin. No side-effects. Mood is good. Does have moderate carpal tunnel. Wears splints, but still having pain/numbness  O: Blood pressure 135/80, pulse 75, temperature 98 °F (36.7 °C), temperature source Oral, resp. rate 16, height 5' 5\" (1.651 m), weight 221 lb (100.2 kg), last menstrual period 09/13/2012, SpO2 94 %, not currently breastfeeding. Exam:   Heart - RRR   Lungs - clear   No edema  A: As above  P:  Continue meds. Recheck lipids   Refer to hand surgery (patient choice)   Follow-up as ordered    I have discussed the case, including pertinent history and exam findings with the resident. I agree with the documented assessment and plan.

## 2019-06-17 ENCOUNTER — TELEPHONE (OUTPATIENT)
Dept: FAMILY MEDICINE CLINIC | Age: 54
End: 2019-06-17

## 2019-06-17 NOTE — TELEPHONE ENCOUNTER
Please let her know that, while we can give limits to the number of days per week that she would be able to work with her medical conditions, it would not be medically justifiable to specify which days she can work. (For example, I can say that she is not able to work more than 5 days per week. I cannot say that she must be off on Saturdays and Sundays specifically.) Letter released in [address] (I believe). Will also print and sign. Thank you!

## 2019-08-06 ENCOUNTER — OFFICE VISIT (OUTPATIENT)
Dept: FAMILY MEDICINE CLINIC | Age: 54
End: 2019-08-06

## 2019-08-06 VITALS
SYSTOLIC BLOOD PRESSURE: 126 MMHG | HEIGHT: 65 IN | OXYGEN SATURATION: 96 % | TEMPERATURE: 98.1 F | RESPIRATION RATE: 18 BRPM | WEIGHT: 222 LBS | BODY MASS INDEX: 36.99 KG/M2 | HEART RATE: 93 BPM | DIASTOLIC BLOOD PRESSURE: 80 MMHG

## 2019-08-06 DIAGNOSIS — F32.2 CURRENT SEVERE EPISODE OF MAJOR DEPRESSIVE DISORDER WITHOUT PSYCHOTIC FEATURES WITHOUT PRIOR EPISODE (HCC): ICD-10-CM

## 2019-08-06 DIAGNOSIS — G89.29 CHRONIC RIGHT SHOULDER PAIN: ICD-10-CM

## 2019-08-06 DIAGNOSIS — J44.9 CHRONIC OBSTRUCTIVE PULMONARY DISEASE, UNSPECIFIED COPD TYPE (HCC): Primary | ICD-10-CM

## 2019-08-06 DIAGNOSIS — M25.511 CHRONIC RIGHT SHOULDER PAIN: ICD-10-CM

## 2019-08-06 DIAGNOSIS — E04.1 THYROID NODULE: ICD-10-CM

## 2019-08-06 DIAGNOSIS — M79.605 LEFT LEG PAIN: ICD-10-CM

## 2019-08-06 DIAGNOSIS — E78.00 PURE HYPERCHOLESTEROLEMIA: ICD-10-CM

## 2019-08-06 PROCEDURE — 99214 OFFICE O/P EST MOD 30 MIN: CPT | Performed by: FAMILY MEDICINE

## 2019-08-06 PROCEDURE — 99212 OFFICE O/P EST SF 10 MIN: CPT | Performed by: FAMILY MEDICINE

## 2019-08-06 RX ORDER — NAPROXEN 250 MG/1
500 TABLET ORAL 2 TIMES DAILY WITH MEALS
COMMUNITY
End: 2019-08-06 | Stop reason: ALTCHOICE

## 2019-08-06 RX ORDER — CITALOPRAM 20 MG/1
20 TABLET ORAL DAILY
Qty: 30 TABLET | Refills: 3 | Status: SHIPPED | OUTPATIENT
Start: 2019-08-06 | End: 2019-12-04

## 2019-08-06 RX ORDER — PRAVASTATIN SODIUM 40 MG
40 TABLET ORAL DAILY
Qty: 90 TABLET | Refills: 1 | Status: SHIPPED
Start: 2019-08-06 | End: 2020-07-19 | Stop reason: SDUPTHER

## 2019-08-06 NOTE — PROGRESS NOTES
Hidradenitis suppurativa 2012    Intertrigo 2012    Onychomycosis 2012    Adhesive capsulitis of right shoulder     Hypertension     Tobacco abuse     COPD (chronic obstructive pulmonary disease) (HCC)     Allergic rhinosinusitis     Rotator cuff (capsule) sprain 2011       Current Outpatient Medications on File Prior to Visit   Medication Sig Dispense Refill    naproxen (NAPROSYN) 250 MG tablet Take 500 mg by mouth 2 times daily (with meals)      famotidine (PEPCID) 20 MG tablet Take 1 tablet by mouth 2 times daily 180 tablet 1    tiotropium (SPIRIVA HANDIHALER) 18 MCG inhalation capsule Inhale 1 capsule into the lungs daily 90 capsule 1    ipratropium-albuterol (DUONEB) 0.5-2.5 (3) MG/3ML SOLN nebulizer solution Inhale 3 mLs into the lungs every 6 hours as needed for Shortness of Breath 360 mL 1    albuterol sulfate HFA (PROVENTIL HFA) 108 (90 Base) MCG/ACT inhaler Inhale 2 puffs into the lungs every 6 hours as needed for Wheezing 1 Inhaler 3    loratadine (CLARITIN) 10 MG tablet Take 1 tablet by mouth daily 90 tablet 3    EPINEPHrine (EPIPEN 2-CAMILLE) 0.3 MG/0.3ML SOAJ injection Use as directed for allergic reaction 2 each 1    acetaminophen (TYLENOL) 500 MG tablet Take 500 mg by mouth every 4 hours as needed for Pain       No current facility-administered medications on file prior to visit. Allergies   Allergen Reactions    Bee Venom     Pcn [Penicillins] Nausea And Vomiting       Social History     Tobacco Use    Smoking status: Former Smoker     Packs/day: 1.00     Years: 36.00     Pack years: 36.00     Types: Cigarettes     Last attempt to quit: 2018     Years since quittin.2    Smokeless tobacco: Never Used   Substance Use Topics    Alcohol use: No     Alcohol/week: 0.0 standard drinks     Comment: Previous alcoholism, 24 years clean.       Drug use: No       ROS:   Review of Systems - General ROS: negative for - chills or fever  Respiratory ROS: 4. Pure hypercholesterolemia  pravastatin (PRAVACHOL) 40 MG tablet   5. Chronic right shoulder pain  diclofenac sodium (VOLTAREN) 1 % GEL   6. Thyroid nodule  US GUIDED NEEDLE PLACEMENT       Plans:    As Above. Please see Patient Instructions for further counseling and information given. Same medications. Hold Naproxen. XR of left ankle and tib/fib. Supportive measures, as above. Avoid systemic NSAIDs. May take Tylenol orally as directed. May use topical Voltaren sparingly. RTO 1 month or sooner prn. Letter recommending restricting work to 4 days per week for now per her request.  Follow COPD symptoms. Try to limit use of albuterol after work if possible, but follow symptoms closely. Obtain thyroid biopsy of nodules and follow dysphagia symptoms. Sees ortho hand soon to discuss CTS. Advised to please be adherent to the treatment plans discussed today, and please call with any questions or concerns, letting the office know of any reasons that the plans may not be followed. The risks of untreated conditions include worsening illness, injury, disability, and possibly, death. Please call if symptoms change in any way, worsen, or fail to completely resolve, as this could necessitate a change to treatment plans. Patient and/or caregiver expressed understanding. Indications and proper use of medication(s) reviewed. Potential side-effects and risks of medication(s) also explained. Patient and/or caregiver was instructed to call if any new symptoms develop prior to next visit.

## 2019-08-09 ENCOUNTER — TELEPHONE (OUTPATIENT)
Dept: ORTHOPEDIC SURGERY | Age: 54
End: 2019-08-09

## 2019-08-09 DIAGNOSIS — R52 PAIN: Primary | ICD-10-CM

## 2019-08-12 ENCOUNTER — OFFICE VISIT (OUTPATIENT)
Dept: ORTHOPEDIC SURGERY | Age: 54
End: 2019-08-12

## 2019-08-12 VITALS — HEART RATE: 84 BPM | DIASTOLIC BLOOD PRESSURE: 91 MMHG | RESPIRATION RATE: 18 BRPM | SYSTOLIC BLOOD PRESSURE: 133 MMHG

## 2019-08-12 DIAGNOSIS — G56.03 BILATERAL CARPAL TUNNEL SYNDROME: Primary | ICD-10-CM

## 2019-08-12 PROCEDURE — 99204 OFFICE O/P NEW MOD 45 MIN: CPT | Performed by: ORTHOPAEDIC SURGERY

## 2019-08-29 ENCOUNTER — HOSPITAL ENCOUNTER (OUTPATIENT)
Dept: ULTRASOUND IMAGING | Age: 54
Discharge: HOME | End: 2019-08-31

## 2019-08-29 DIAGNOSIS — E04.1 THYROID NODULE: ICD-10-CM

## 2019-08-29 PROCEDURE — 88305 TISSUE EXAM BY PATHOLOGIST: CPT

## 2019-08-29 PROCEDURE — 88173 CYTOPATH EVAL FNA REPORT: CPT

## 2019-08-29 PROCEDURE — 10005 FNA BX W/US GDN 1ST LES: CPT

## 2019-09-09 PROBLEM — E04.1 THYROID NODULE: Chronic | Status: ACTIVE | Noted: 2019-04-03

## 2019-09-17 ENCOUNTER — OFFICE VISIT (OUTPATIENT)
Dept: FAMILY MEDICINE CLINIC | Age: 54
End: 2019-09-17

## 2019-09-17 VITALS
DIASTOLIC BLOOD PRESSURE: 75 MMHG | BODY MASS INDEX: 36.99 KG/M2 | SYSTOLIC BLOOD PRESSURE: 125 MMHG | OXYGEN SATURATION: 95 % | WEIGHT: 222 LBS | HEIGHT: 65 IN | HEART RATE: 92 BPM | TEMPERATURE: 98 F

## 2019-09-17 DIAGNOSIS — J44.9 CHRONIC OBSTRUCTIVE PULMONARY DISEASE, UNSPECIFIED COPD TYPE (HCC): Primary | ICD-10-CM

## 2019-09-17 DIAGNOSIS — M19.172 POST-TRAUMATIC ARTHRITIS OF LEFT ANKLE: ICD-10-CM

## 2019-09-17 DIAGNOSIS — R13.19 ESOPHAGEAL DYSPHAGIA: ICD-10-CM

## 2019-09-17 DIAGNOSIS — N20.0 NEPHROLITHIASIS: ICD-10-CM

## 2019-09-17 PROCEDURE — 99214 OFFICE O/P EST MOD 30 MIN: CPT | Performed by: FAMILY MEDICINE

## 2019-09-17 PROCEDURE — 99212 OFFICE O/P EST SF 10 MIN: CPT | Performed by: FAMILY MEDICINE

## 2019-09-17 NOTE — PROGRESS NOTES
ROS: stable cough and dyspnea with COPD  Cardiovascular ROS: no chest pain or dyspnea on exertion  Gastrointestinal ROS: no abdominal pain, change in bowel habits, or black or bloody stools  Genito-Urinary ROS: no dysuria, trouble voiding, or hematuria    Physical Exam:    VS:  Blood pressure 125/75, pulse 92, temperature 98 °F (36.7 °C), temperature source Oral, height 5' 5\" (1.651 m), weight 222 lb (100.7 kg), last menstrual period 09/13/2012, SpO2 95 %, not currently breastfeeding. General Appearance:  awake, alert, oriented, in no acute distress and well developed, well nourished  Head/face:  NCAT  Eyes:  EOMI and Sclera nonicteric  Neck:  neck- supple, no mass, non-tender  Lungs:  Normal expansion. Clear to auscultation. No rales, rhonchi, or wheezing. Heart:  Heart regular rate and rhythm  Abdomen:  Soft, NT, ND   Extremities: pulses present in all extremities, trace pedal edema and left ankle with some diffuse soft tissue swelling anteriorly and laterally. ROM stable overall, chronically decreased due to pain. Able to bear weight. No significant effusion, no erythema or warmth. No laxity. Assessments:      Diagnosis Orders   1. Chronic obstructive pulmonary disease, unspecified COPD type ([address]     2. Esophageal dysphagia  Mane Martínez MD, General Surgery, [facility]   3. Post-traumatic arthritis of left ankle  XR ANKLE LEFT (MIN 3 VIEWS)   4. Nephrolithiasis  US RETROPERITONEAL COMPLETE       Plans:    As Above. Please see Patient Instructions for further counseling and information given. Advised to follow up with pulm as directed. Same medications for now otherwise. RTO 1 month or sooner prn. Advised to please be adherent to the treatment plans discussed today, and please call with any questions or concerns, letting the office know of any reasons that the plans may not be followed.   The risks of untreated conditions include worsening illness, injury, disability, and possibly, death. Please call if symptoms change in any way, worsen, or fail to completely resolve, as this could necessitate a change to treatment plans. Patient and/or caregiver expressed understanding. Indications and proper use of medication(s) reviewed. Potential side-effects and risks of medication(s) also explained. Patient and/or caregiver was instructed to call if any new symptoms develop prior to next visit. Refer for Gen Surg. EGD. US renal.  Increase plain water intake.

## 2019-09-24 ENCOUNTER — PREP FOR PROCEDURE (OUTPATIENT)
Dept: ORTHOPEDIC SURGERY | Age: 54
End: 2019-09-24

## 2019-09-24 RX ORDER — SODIUM CHLORIDE 0.9 % (FLUSH) 0.9 %
10 SYRINGE (ML) INJECTION EVERY 12 HOURS SCHEDULED
Status: CANCELLED | OUTPATIENT
Start: 2019-09-24

## 2019-09-24 RX ORDER — SODIUM CHLORIDE 9 MG/ML
INJECTION, SOLUTION INTRAVENOUS CONTINUOUS
Status: CANCELLED | OUTPATIENT
Start: 2019-09-24

## 2019-09-24 RX ORDER — SODIUM CHLORIDE 0.9 % (FLUSH) 0.9 %
10 SYRINGE (ML) INJECTION PRN
Status: CANCELLED | OUTPATIENT
Start: 2019-09-24

## 2019-09-30 ENCOUNTER — HOSPITAL ENCOUNTER (EMERGENCY)
Age: 54
Discharge: HOME | End: 2019-09-30
Attending: EMERGENCY MEDICINE
Payer: COMMERCIAL

## 2019-09-30 ENCOUNTER — APPOINTMENT (OUTPATIENT)
Dept: GENERAL RADIOLOGY | Age: 54
End: 2019-09-30

## 2019-09-30 VITALS
TEMPERATURE: 97.4 F | OXYGEN SATURATION: 99 % | HEIGHT: 65 IN | BODY MASS INDEX: 36.99 KG/M2 | RESPIRATION RATE: 23 BRPM | WEIGHT: 222 LBS | HEART RATE: 85 BPM | DIASTOLIC BLOOD PRESSURE: 100 MMHG | SYSTOLIC BLOOD PRESSURE: 153 MMHG

## 2019-09-30 DIAGNOSIS — Z77.29 EXPOSURE TO NATURAL GAS: ICD-10-CM

## 2019-09-30 DIAGNOSIS — R06.00 DYSPNEA, UNSPECIFIED TYPE: Primary | ICD-10-CM

## 2019-09-30 LAB
ALBUMIN SERPL-MCNC: 4 G/DL (ref 3.5–5.2)
ALP BLD-CCNC: 92 U/L (ref 35–104)
ALT SERPL-CCNC: 29 U/L (ref 0–32)
ANION GAP SERPL CALCULATED.3IONS-SCNC: 10 MMOL/L (ref 7–16)
AST SERPL-CCNC: 21 U/L (ref 0–31)
B.E.: 1.6 MMOL/L (ref -3–3)
BASOPHILS ABSOLUTE: 0.06 E9/L (ref 0–0.2)
BASOPHILS RELATIVE PERCENT: 0.7 % (ref 0–2)
BILIRUB SERPL-MCNC: 0.3 MG/DL (ref 0–1.2)
BUN BLDV-MCNC: 10 MG/DL (ref 6–20)
CALCIUM SERPL-MCNC: 9.3 MG/DL (ref 8.6–10.2)
CHLORIDE BLD-SCNC: 105 MMOL/L (ref 98–107)
CO2: 26 MMOL/L (ref 22–29)
COHB: 0.3 % (ref 0–1.5)
CREAT SERPL-MCNC: 0.7 MG/DL (ref 0.5–1)
CRITICAL: ABNORMAL
DATE ANALYZED: ABNORMAL
DATE OF COLLECTION: ABNORMAL
EOSINOPHILS ABSOLUTE: 0.19 E9/L (ref 0.05–0.5)
EOSINOPHILS RELATIVE PERCENT: 2.3 % (ref 0–6)
GFR AFRICAN AMERICAN: >60
GFR NON-AFRICAN AMERICAN: >60 ML/MIN/1.73
GLUCOSE BLD-MCNC: 102 MG/DL (ref 74–99)
HCO3: 26.3 MMOL/L (ref 22–26)
HCT VFR BLD CALC: 45.3 % (ref 34–48)
HEMOGLOBIN: 14.6 G/DL (ref 11.5–15.5)
HHB: 1.9 % (ref 0–5)
IMMATURE GRANULOCYTES #: 0.02 E9/L
IMMATURE GRANULOCYTES %: 0.2 % (ref 0–5)
LAB: ABNORMAL
LYMPHOCYTES ABSOLUTE: 2.77 E9/L (ref 1.5–4)
LYMPHOCYTES RELATIVE PERCENT: 34.2 % (ref 20–42)
Lab: ABNORMAL
MCH RBC QN AUTO: 31.3 PG (ref 26–35)
MCHC RBC AUTO-ENTMCNC: 32.2 % (ref 32–34.5)
MCV RBC AUTO: 97.2 FL (ref 80–99.9)
METHB: 0.4 % (ref 0–1.5)
MODE: ABNORMAL
MONOCYTES ABSOLUTE: 0.46 E9/L (ref 0.1–0.95)
MONOCYTES RELATIVE PERCENT: 5.7 % (ref 2–12)
NEUTROPHILS ABSOLUTE: 4.59 E9/L (ref 1.8–7.3)
NEUTROPHILS RELATIVE PERCENT: 56.9 % (ref 43–80)
O2 CONTENT: 25.1 ML/DL
O2 SATURATION: 98.1 % (ref 92–98.5)
O2HB: 97.4 % (ref 94–97)
OPERATOR ID: 359
PATIENT TEMP: 37 C
PCO2: 41.5 MMHG (ref 35–45)
PDW BLD-RTO: 13.1 FL (ref 11.5–15)
PH BLOOD GAS: 7.42 (ref 7.35–7.45)
PLATELET # BLD: 339 E9/L (ref 130–450)
PMV BLD AUTO: 8.6 FL (ref 7–12)
PO2: 104.1 MMHG (ref 60–100)
POTASSIUM SERPL-SCNC: 3.8 MMOL/L (ref 3.5–5)
RBC # BLD: 4.66 E12/L (ref 3.5–5.5)
SODIUM BLD-SCNC: 141 MMOL/L (ref 132–146)
SOURCE, BLOOD GAS: ABNORMAL
THB: 18.3 G/DL (ref 11.5–16.5)
TIME ANALYZED: 1640
TOTAL PROTEIN: 7.6 G/DL (ref 6.4–8.3)
TROPONIN: <0.01 NG/ML (ref 0–0.03)
WBC # BLD: 8.1 E9/L (ref 4.5–11.5)

## 2019-09-30 PROCEDURE — 94640 AIRWAY INHALATION TREATMENT: CPT

## 2019-09-30 PROCEDURE — 85025 COMPLETE CBC W/AUTO DIFF WBC: CPT

## 2019-09-30 PROCEDURE — 99285 EMERGENCY DEPT VISIT HI MDM: CPT

## 2019-09-30 PROCEDURE — 80053 COMPREHEN METABOLIC PANEL: CPT

## 2019-09-30 PROCEDURE — 36415 COLL VENOUS BLD VENIPUNCTURE: CPT

## 2019-09-30 PROCEDURE — 82805 BLOOD GASES W/O2 SATURATION: CPT

## 2019-09-30 PROCEDURE — 94664 DEMO&/EVAL PT USE INHALER: CPT

## 2019-09-30 PROCEDURE — 6370000000 HC RX 637 (ALT 250 FOR IP): Performed by: EMERGENCY MEDICINE

## 2019-09-30 PROCEDURE — 84484 ASSAY OF TROPONIN QUANT: CPT

## 2019-09-30 PROCEDURE — 71045 X-RAY EXAM CHEST 1 VIEW: CPT

## 2019-09-30 PROCEDURE — 93005 ELECTROCARDIOGRAM TRACING: CPT | Performed by: EMERGENCY MEDICINE

## 2019-09-30 RX ORDER — IPRATROPIUM BROMIDE AND ALBUTEROL SULFATE 2.5; .5 MG/3ML; MG/3ML
1 SOLUTION RESPIRATORY (INHALATION) ONCE
Status: COMPLETED | OUTPATIENT
Start: 2019-09-30 | End: 2019-09-30

## 2019-09-30 RX ADMIN — IPRATROPIUM BROMIDE AND ALBUTEROL SULFATE 1 AMPULE: .5; 3 SOLUTION RESPIRATORY (INHALATION) at 16:15

## 2019-10-01 LAB
EKG ATRIAL RATE: 71 BPM
EKG P AXIS: 32 DEGREES
EKG P-R INTERVAL: 176 MS
EKG Q-T INTERVAL: 406 MS
EKG QRS DURATION: 88 MS
EKG QTC CALCULATION (BAZETT): 441 MS
EKG R AXIS: 10 DEGREES
EKG T AXIS: 44 DEGREES
EKG VENTRICULAR RATE: 71 BPM

## 2019-10-01 PROCEDURE — 93010 ELECTROCARDIOGRAM REPORT: CPT | Performed by: INTERNAL MEDICINE

## 2019-10-02 ENCOUNTER — ANESTHESIA (OUTPATIENT)
Dept: OPERATING ROOM | Age: 54
End: 2019-10-02

## 2019-10-02 ENCOUNTER — ANESTHESIA EVENT (OUTPATIENT)
Dept: OPERATING ROOM | Age: 54
End: 2019-10-02

## 2019-10-02 ENCOUNTER — HOSPITAL ENCOUNTER (OUTPATIENT)
Age: 54
Setting detail: OUTPATIENT SURGERY
Discharge: HOME | End: 2019-10-02
Attending: ORTHOPAEDIC SURGERY | Admitting: ORTHOPAEDIC SURGERY

## 2019-10-02 VITALS
HEIGHT: 65 IN | WEIGHT: 222 LBS | SYSTOLIC BLOOD PRESSURE: 122 MMHG | TEMPERATURE: 97.8 F | DIASTOLIC BLOOD PRESSURE: 70 MMHG | BODY MASS INDEX: 36.99 KG/M2 | OXYGEN SATURATION: 98 % | HEART RATE: 72 BPM | RESPIRATION RATE: 18 BRPM

## 2019-10-02 VITALS — DIASTOLIC BLOOD PRESSURE: 66 MMHG | SYSTOLIC BLOOD PRESSURE: 125 MMHG | OXYGEN SATURATION: 99 %

## 2019-10-02 DIAGNOSIS — G89.18 POST-OPERATIVE PAIN: Primary | ICD-10-CM

## 2019-10-02 PROCEDURE — 6360000002 HC RX W HCPCS: Performed by: NURSE ANESTHETIST, CERTIFIED REGISTERED

## 2019-10-02 PROCEDURE — 2500000003 HC RX 250 WO HCPCS: Performed by: PHYSICIAN ASSISTANT

## 2019-10-02 PROCEDURE — 29848 WRIST ENDOSCOPY/SURGERY: CPT | Performed by: ORTHOPAEDIC SURGERY

## 2019-10-02 PROCEDURE — 2580000003 HC RX 258: Performed by: PHYSICIAN ASSISTANT

## 2019-10-02 PROCEDURE — 26055 INCISE FINGER TENDON SHEATH: CPT | Performed by: ORTHOPAEDIC SURGERY

## 2019-10-02 PROCEDURE — 3600000012 HC SURGERY LEVEL 2 ADDTL 15MIN: Performed by: ORTHOPAEDIC SURGERY

## 2019-10-02 PROCEDURE — 3600000002 HC SURGERY LEVEL 2 BASE: Performed by: ORTHOPAEDIC SURGERY

## 2019-10-02 PROCEDURE — 2500000003 HC RX 250 WO HCPCS: Performed by: ORTHOPAEDIC SURGERY

## 2019-10-02 PROCEDURE — 3700000000 HC ANESTHESIA ATTENDED CARE: Performed by: ORTHOPAEDIC SURGERY

## 2019-10-02 PROCEDURE — 2720000010 HC SURG SUPPLY STERILE: Performed by: ORTHOPAEDIC SURGERY

## 2019-10-02 PROCEDURE — 7100000011 HC PHASE II RECOVERY - ADDTL 15 MIN: Performed by: ORTHOPAEDIC SURGERY

## 2019-10-02 PROCEDURE — 2709999900 HC NON-CHARGEABLE SUPPLY: Performed by: ORTHOPAEDIC SURGERY

## 2019-10-02 PROCEDURE — 7100000010 HC PHASE II RECOVERY - FIRST 15 MIN: Performed by: ORTHOPAEDIC SURGERY

## 2019-10-02 PROCEDURE — 2500000003 HC RX 250 WO HCPCS: Performed by: NURSE ANESTHETIST, CERTIFIED REGISTERED

## 2019-10-02 PROCEDURE — 3700000001 HC ADD 15 MINUTES (ANESTHESIA): Performed by: ORTHOPAEDIC SURGERY

## 2019-10-02 RX ORDER — FENTANYL CITRATE 50 UG/ML
INJECTION, SOLUTION INTRAMUSCULAR; INTRAVENOUS PRN
Status: DISCONTINUED | OUTPATIENT
Start: 2019-10-02 | End: 2019-10-02 | Stop reason: SDUPTHER

## 2019-10-02 RX ORDER — LIDOCAINE HYDROCHLORIDE 20 MG/ML
INJECTION, SOLUTION EPIDURAL; INFILTRATION; INTRACAUDAL; PERINEURAL PRN
Status: DISCONTINUED | OUTPATIENT
Start: 2019-10-02 | End: 2019-10-02 | Stop reason: SDUPTHER

## 2019-10-02 RX ORDER — HYDROCODONE BITARTRATE AND ACETAMINOPHEN 5; 325 MG/1; MG/1
1 TABLET ORAL EVERY 6 HOURS PRN
Qty: 12 TABLET | Refills: 0 | Status: SHIPPED | OUTPATIENT
Start: 2019-10-02 | End: 2019-10-09

## 2019-10-02 RX ORDER — CLINDAMYCIN PHOSPHATE 900 MG/50ML
900 INJECTION INTRAVENOUS
Status: COMPLETED | OUTPATIENT
Start: 2019-10-02 | End: 2019-10-02

## 2019-10-02 RX ORDER — MIDAZOLAM HYDROCHLORIDE 1 MG/ML
INJECTION INTRAMUSCULAR; INTRAVENOUS PRN
Status: DISCONTINUED | OUTPATIENT
Start: 2019-10-02 | End: 2019-10-02 | Stop reason: SDUPTHER

## 2019-10-02 RX ORDER — SODIUM CHLORIDE 9 MG/ML
INJECTION, SOLUTION INTRAVENOUS CONTINUOUS
Status: DISCONTINUED | OUTPATIENT
Start: 2019-10-02 | End: 2019-10-02 | Stop reason: HOSPADM

## 2019-10-02 RX ORDER — SODIUM CHLORIDE 0.9 % (FLUSH) 0.9 %
10 SYRINGE (ML) INJECTION EVERY 12 HOURS SCHEDULED
Status: DISCONTINUED | OUTPATIENT
Start: 2019-10-02 | End: 2019-10-02 | Stop reason: HOSPADM

## 2019-10-02 RX ORDER — PROPOFOL 10 MG/ML
INJECTION, EMULSION INTRAVENOUS CONTINUOUS PRN
Status: DISCONTINUED | OUTPATIENT
Start: 2019-10-02 | End: 2019-10-02 | Stop reason: SDUPTHER

## 2019-10-02 RX ORDER — LIDOCAINE HYDROCHLORIDE AND EPINEPHRINE 10; 10 MG/ML; UG/ML
INJECTION, SOLUTION INFILTRATION; PERINEURAL PRN
Status: DISCONTINUED | OUTPATIENT
Start: 2019-10-02 | End: 2019-10-02 | Stop reason: ALTCHOICE

## 2019-10-02 RX ORDER — SODIUM CHLORIDE 0.9 % (FLUSH) 0.9 %
10 SYRINGE (ML) INJECTION PRN
Status: DISCONTINUED | OUTPATIENT
Start: 2019-10-02 | End: 2019-10-02 | Stop reason: HOSPADM

## 2019-10-02 RX ORDER — PROPOFOL 10 MG/ML
INJECTION, EMULSION INTRAVENOUS PRN
Status: DISCONTINUED | OUTPATIENT
Start: 2019-10-02 | End: 2019-10-02 | Stop reason: SDUPTHER

## 2019-10-02 RX ADMIN — PROPOFOL 100 MG: 10 INJECTION, EMULSION INTRAVENOUS at 07:55

## 2019-10-02 RX ADMIN — MIDAZOLAM HYDROCHLORIDE 2 MG: 1 INJECTION, SOLUTION INTRAMUSCULAR; INTRAVENOUS at 07:52

## 2019-10-02 RX ADMIN — FENTANYL CITRATE 50 MCG: 50 INJECTION, SOLUTION INTRAMUSCULAR; INTRAVENOUS at 07:55

## 2019-10-02 RX ADMIN — SODIUM CHLORIDE: 9 INJECTION, SOLUTION INTRAVENOUS at 07:52

## 2019-10-02 RX ADMIN — FENTANYL CITRATE 50 MCG: 50 INJECTION, SOLUTION INTRAMUSCULAR; INTRAVENOUS at 07:52

## 2019-10-02 RX ADMIN — CLINDAMYCIN IN 5 PERCENT DEXTROSE 900 MG: 18 INJECTION, SOLUTION INTRAVENOUS at 07:52

## 2019-10-02 RX ADMIN — LIDOCAINE HYDROCHLORIDE 40 MG: 20 INJECTION, SOLUTION EPIDURAL; INFILTRATION; INTRACAUDAL; PERINEURAL at 07:52

## 2019-10-02 RX ADMIN — PROPOFOL 50 MCG/KG/MIN: 10 INJECTION, EMULSION INTRAVENOUS at 07:55

## 2019-10-02 ASSESSMENT — PULMONARY FUNCTION TESTS
PIF_VALUE: 1
PIF_VALUE: 0
PIF_VALUE: 1
PIF_VALUE: 0
PIF_VALUE: 1
PIF_VALUE: 0
PIF_VALUE: 0
PIF_VALUE: 1

## 2019-10-02 ASSESSMENT — ENCOUNTER SYMPTOMS: SHORTNESS OF BREATH: 1

## 2019-10-02 ASSESSMENT — PAIN SCALES - GENERAL: PAINLEVEL_OUTOF10: 0

## 2019-10-07 ENCOUNTER — OFFICE VISIT (OUTPATIENT)
Dept: SURGERY | Age: 54
End: 2019-10-07

## 2019-10-07 VITALS
OXYGEN SATURATION: 95 % | BODY MASS INDEX: 37.49 KG/M2 | RESPIRATION RATE: 14 BRPM | SYSTOLIC BLOOD PRESSURE: 130 MMHG | HEIGHT: 65 IN | DIASTOLIC BLOOD PRESSURE: 68 MMHG | WEIGHT: 225 LBS | HEART RATE: 74 BPM

## 2019-10-07 DIAGNOSIS — K21.9 GASTROESOPHAGEAL REFLUX DISEASE, ESOPHAGITIS PRESENCE NOT SPECIFIED: ICD-10-CM

## 2019-10-07 DIAGNOSIS — R13.10 SWALLOWING DYSFUNCTION: Primary | ICD-10-CM

## 2019-10-07 PROCEDURE — 99212 OFFICE O/P EST SF 10 MIN: CPT | Performed by: SURGERY

## 2019-10-07 PROCEDURE — 99202 OFFICE O/P NEW SF 15 MIN: CPT | Performed by: SURGERY

## 2019-10-07 RX ORDER — IBUPROFEN 800 MG/1
800 TABLET ORAL EVERY 6 HOURS PRN
COMMUNITY
End: 2020-08-10 | Stop reason: ALTCHOICE

## 2019-10-07 ASSESSMENT — ENCOUNTER SYMPTOMS
ABDOMINAL PAIN: 0
CHOKING: 0
SHORTNESS OF BREATH: 0
ABDOMINAL DISTENTION: 0
TROUBLE SWALLOWING: 1

## 2019-10-08 ENCOUNTER — HOSPITAL ENCOUNTER (OUTPATIENT)
Dept: ULTRASOUND IMAGING | Age: 54
Discharge: HOME | End: 2019-10-10

## 2019-10-08 DIAGNOSIS — N20.0 NEPHROLITHIASIS: ICD-10-CM

## 2019-10-08 PROCEDURE — 76770 US EXAM ABDO BACK WALL COMP: CPT

## 2019-10-10 ENCOUNTER — OFFICE VISIT (OUTPATIENT)
Dept: ORTHOPEDIC SURGERY | Age: 54
End: 2019-10-10

## 2019-10-10 VITALS
SYSTOLIC BLOOD PRESSURE: 136 MMHG | TEMPERATURE: 97.8 F | RESPIRATION RATE: 20 BRPM | HEIGHT: 65 IN | HEART RATE: 72 BPM | BODY MASS INDEX: 36.99 KG/M2 | DIASTOLIC BLOOD PRESSURE: 98 MMHG | WEIGHT: 222 LBS

## 2019-10-10 DIAGNOSIS — R52 PAIN: Primary | ICD-10-CM

## 2019-10-10 DIAGNOSIS — G56.03 BILATERAL CARPAL TUNNEL SYNDROME: ICD-10-CM

## 2019-10-10 PROCEDURE — 99024 POSTOP FOLLOW-UP VISIT: CPT | Performed by: ORTHOPAEDIC SURGERY

## 2019-10-14 ENCOUNTER — PREP FOR PROCEDURE (OUTPATIENT)
Dept: ORTHOPEDIC SURGERY | Age: 54
End: 2019-10-14

## 2019-10-14 RX ORDER — SODIUM CHLORIDE 0.9 % (FLUSH) 0.9 %
10 SYRINGE (ML) INJECTION PRN
Status: CANCELLED | OUTPATIENT
Start: 2019-10-14

## 2019-10-14 RX ORDER — SODIUM CHLORIDE 0.9 % (FLUSH) 0.9 %
10 SYRINGE (ML) INJECTION EVERY 12 HOURS SCHEDULED
Status: CANCELLED | OUTPATIENT
Start: 2019-10-14

## 2019-10-14 RX ORDER — SODIUM CHLORIDE 9 MG/ML
INJECTION, SOLUTION INTRAVENOUS CONTINUOUS
Status: CANCELLED | OUTPATIENT
Start: 2019-10-14

## 2019-10-16 ENCOUNTER — OFFICE VISIT (OUTPATIENT)
Dept: FAMILY MEDICINE CLINIC | Age: 54
End: 2019-10-16

## 2019-10-16 VITALS
WEIGHT: 227.9 LBS | DIASTOLIC BLOOD PRESSURE: 70 MMHG | HEIGHT: 65 IN | OXYGEN SATURATION: 96 % | SYSTOLIC BLOOD PRESSURE: 142 MMHG | TEMPERATURE: 98.1 F | RESPIRATION RATE: 16 BRPM | BODY MASS INDEX: 37.97 KG/M2 | HEART RATE: 91 BPM

## 2019-10-16 DIAGNOSIS — J44.9 CHRONIC OBSTRUCTIVE PULMONARY DISEASE, UNSPECIFIED COPD TYPE (HCC): ICD-10-CM

## 2019-10-16 PROCEDURE — 99213 OFFICE O/P EST LOW 20 MIN: CPT | Performed by: STUDENT IN AN ORGANIZED HEALTH CARE EDUCATION/TRAINING PROGRAM

## 2019-10-16 PROCEDURE — 99212 OFFICE O/P EST SF 10 MIN: CPT | Performed by: STUDENT IN AN ORGANIZED HEALTH CARE EDUCATION/TRAINING PROGRAM

## 2019-10-19 ASSESSMENT — ENCOUNTER SYMPTOMS
WHEEZING: 0
NAUSEA: 0
BACK PAIN: 0
RHINORRHEA: 0
COLOR CHANGE: 0
CONSTIPATION: 0
ABDOMINAL DISTENTION: 0
SHORTNESS OF BREATH: 0
SORE THROAT: 0
ABDOMINAL PAIN: 0
EYE DISCHARGE: 0
EYE REDNESS: 0
COUGH: 0
DIARRHEA: 0
EYE ITCHING: 0
VOMITING: 0

## 2019-10-25 ENCOUNTER — TELEPHONE (OUTPATIENT)
Dept: ORTHOPEDIC SURGERY | Age: 54
End: 2019-10-25

## 2019-12-04 ENCOUNTER — OFFICE VISIT (OUTPATIENT)
Dept: FAMILY MEDICINE CLINIC | Age: 54
End: 2019-12-04

## 2019-12-04 VITALS
SYSTOLIC BLOOD PRESSURE: 128 MMHG | BODY MASS INDEX: 38.15 KG/M2 | WEIGHT: 229 LBS | HEIGHT: 65 IN | RESPIRATION RATE: 18 BRPM | TEMPERATURE: 97.9 F | OXYGEN SATURATION: 94 % | DIASTOLIC BLOOD PRESSURE: 74 MMHG | HEART RATE: 86 BPM

## 2019-12-04 DIAGNOSIS — J06.9 VIRAL URI: ICD-10-CM

## 2019-12-04 DIAGNOSIS — F32.A DEPRESSION, UNSPECIFIED DEPRESSION TYPE: ICD-10-CM

## 2019-12-04 DIAGNOSIS — J44.9 CHRONIC OBSTRUCTIVE PULMONARY DISEASE, UNSPECIFIED COPD TYPE (HCC): Primary | ICD-10-CM

## 2019-12-04 DIAGNOSIS — G47.00 INSOMNIA, UNSPECIFIED TYPE: ICD-10-CM

## 2019-12-04 PROCEDURE — G0008 ADMIN INFLUENZA VIRUS VAC: HCPCS

## 2019-12-04 PROCEDURE — 90686 IIV4 VACC NO PRSV 0.5 ML IM: CPT

## 2019-12-04 PROCEDURE — G0009 ADMIN PNEUMOCOCCAL VACCINE: HCPCS

## 2019-12-04 PROCEDURE — 90732 PPSV23 VACC 2 YRS+ SUBQ/IM: CPT

## 2019-12-04 PROCEDURE — 99213 OFFICE O/P EST LOW 20 MIN: CPT | Performed by: STUDENT IN AN ORGANIZED HEALTH CARE EDUCATION/TRAINING PROGRAM

## 2019-12-04 PROCEDURE — 6360000002 HC RX W HCPCS

## 2019-12-04 PROCEDURE — 99212 OFFICE O/P EST SF 10 MIN: CPT | Performed by: STUDENT IN AN ORGANIZED HEALTH CARE EDUCATION/TRAINING PROGRAM

## 2019-12-04 RX ORDER — CITALOPRAM 40 MG/1
40 TABLET ORAL DAILY
Qty: 90 TABLET | Refills: 1 | Status: SHIPPED
Start: 2019-12-04 | End: 2021-08-27

## 2019-12-06 ASSESSMENT — ENCOUNTER SYMPTOMS
SHORTNESS OF BREATH: 1
COUGH: 1
WHEEZING: 0
ABDOMINAL PAIN: 0

## 2020-01-04 ENCOUNTER — HOSPITAL ENCOUNTER (EMERGENCY)
Age: 55
Discharge: HOME | End: 2020-01-04

## 2020-01-04 ENCOUNTER — APPOINTMENT (OUTPATIENT)
Dept: GENERAL RADIOLOGY | Age: 55
End: 2020-01-04

## 2020-01-04 VITALS
TEMPERATURE: 98 F | OXYGEN SATURATION: 94 % | HEART RATE: 89 BPM | WEIGHT: 225 LBS | DIASTOLIC BLOOD PRESSURE: 82 MMHG | SYSTOLIC BLOOD PRESSURE: 140 MMHG | RESPIRATION RATE: 16 BRPM | HEIGHT: 65 IN | BODY MASS INDEX: 37.49 KG/M2

## 2020-01-04 PROCEDURE — 99283 EMERGENCY DEPT VISIT LOW MDM: CPT

## 2020-01-04 PROCEDURE — 71046 X-RAY EXAM CHEST 2 VIEWS: CPT

## 2020-01-04 PROCEDURE — 6370000000 HC RX 637 (ALT 250 FOR IP): Performed by: PHYSICIAN ASSISTANT

## 2020-01-04 RX ORDER — PREDNISONE 10 MG/1
TABLET ORAL
Qty: 20 TABLET | Refills: 0 | Status: SHIPPED | OUTPATIENT
Start: 2020-01-04 | End: 2020-01-14

## 2020-01-04 RX ORDER — GUAIFENESIN 1200 MG/1
1 TABLET, EXTENDED RELEASE ORAL 2 TIMES DAILY PRN
Qty: 14 TABLET | Refills: 0 | Status: SHIPPED | OUTPATIENT
Start: 2020-01-04 | End: 2020-01-08 | Stop reason: SINTOL

## 2020-01-04 RX ORDER — BENZONATATE 100 MG/1
100 CAPSULE ORAL 3 TIMES DAILY PRN
Qty: 21 CAPSULE | Refills: 0 | Status: SHIPPED | OUTPATIENT
Start: 2020-01-04 | End: 2022-01-12 | Stop reason: SDUPTHER

## 2020-01-04 RX ORDER — PREDNISONE 20 MG/1
40 TABLET ORAL ONCE
Status: COMPLETED | OUTPATIENT
Start: 2020-01-04 | End: 2020-01-04

## 2020-01-04 RX ADMIN — PREDNISONE 40 MG: 20 TABLET ORAL at 16:22

## 2020-01-04 ASSESSMENT — ENCOUNTER SYMPTOMS
CONSTIPATION: 0
FACIAL SWELLING: 0
CHEST TIGHTNESS: 0
SORE THROAT: 0
TROUBLE SWALLOWING: 0
BACK PAIN: 0
COUGH: 1
SINUS PAIN: 1
SINUS PRESSURE: 1
NAUSEA: 0
ABDOMINAL PAIN: 0
DIARRHEA: 0
SHORTNESS OF BREATH: 0
COLOR CHANGE: 0
VOMITING: 0

## 2020-01-04 ASSESSMENT — PAIN DESCRIPTION - ONSET: ONSET: ON-GOING

## 2020-01-04 ASSESSMENT — PAIN DESCRIPTION - PAIN TYPE: TYPE: ACUTE PAIN

## 2020-01-04 ASSESSMENT — PAIN DESCRIPTION - LOCATION: LOCATION: CHEST

## 2020-01-04 ASSESSMENT — PAIN DESCRIPTION - DESCRIPTORS: DESCRIPTORS: DISCOMFORT

## 2020-01-04 ASSESSMENT — PAIN SCALES - GENERAL: PAINLEVEL_OUTOF10: 5

## 2020-01-04 ASSESSMENT — PAIN DESCRIPTION - FREQUENCY: FREQUENCY: INTERMITTENT

## 2020-01-04 NOTE — ED PROVIDER NOTES
Department of Emergency Medicine   ED  Provider Note  Admit Date/RoomTime: 1/4/2020  4:01 PM  ED Room: [address] [place] [address]    HPI:  1/4/20, Time: 5:06 PM      The patient is a 51-year-old female with a history of COPD presenting to the emergency department with a cough productive of yellow sputum and sinus pain and pressure for the last 4 days. She states she cannot sleep due to the cough. She has been using her at home nebulizer without much relief. She feels like she has bronchitis. She denies any chest pain, shortness of breath, fevers or chills, dizziness, sore throat or ear pain. The history is provided by the patient. No  was used. REVIEW OF SYSTEMS:  Review of Systems   Constitutional: Negative for activity change, chills, fatigue and fever. HENT: Positive for sinus pressure and sinus pain. Negative for congestion, ear pain, facial swelling, sore throat and trouble swallowing. Respiratory: Positive for cough. Negative for chest tightness and shortness of breath. Cardiovascular: Negative for chest pain, palpitations and leg swelling. Gastrointestinal: Negative for abdominal pain, constipation, diarrhea, nausea and vomiting. Genitourinary: Negative for dysuria, flank pain, frequency and hematuria. Musculoskeletal: Negative for back pain, neck pain and neck stiffness. Skin: Negative for color change, pallor and rash. Neurological: Negative for dizziness, weakness, light-headedness and headaches. Psychiatric/Behavioral: Negative for agitation, behavioral problems and confusion.       Pertinent positives and negatives are stated within HPI, all other systems reviewed and are negative.      --------------------------------------------- PAST HISTORY ---------------------------------------------  Past Medical History:  has a past medical history of Adhesive capsulitis of right shoulder, Alcoholism ([address] Allergic rhinosinusitis, Asthma, Bilateral 98 °F (36.7 °C)   Resp 16   Ht 5' 5\" (1.651 m)   Wt 225 lb (102.1 kg)   LMP 09/13/2012   SpO2 94%   BMI 37.44 kg/m²   Oxygen Saturation Interpretation: Normal      ---------------------------------------------------PHYSICAL EXAM--------------------------------------    Physical Exam  Vitals signs and nursing note reviewed. Constitutional:       General: She is not in acute distress. Appearance: Normal appearance. She is well-developed. She is not ill-appearing. HENT:      Head: Normocephalic and atraumatic. Right Ear: Tympanic membrane normal.      Left Ear: Tympanic membrane normal.      Nose: No congestion or rhinorrhea. Mouth/Throat:      Mouth: Mucous membranes are moist.      Pharynx: No oropharyngeal exudate or posterior oropharyngeal erythema. Eyes:      General:         Right eye: No discharge. Left eye: No discharge. Extraocular Movements: Extraocular movements intact. Conjunctiva/sclera: Conjunctivae normal.   Neck:      Musculoskeletal: Normal range of motion and neck supple. Vascular: No JVD. Trachea: No tracheal deviation. Cardiovascular:      Rate and Rhythm: Normal rate and regular rhythm. Heart sounds: Normal heart sounds. No murmur. Pulmonary:      Effort: Pulmonary effort is normal. No respiratory distress. Breath sounds: Normal breath sounds. Musculoskeletal: Normal range of motion. General: No tenderness or deformity. Lymphadenopathy:      Cervical: No cervical adenopathy. Skin:     General: Skin is warm and dry. Capillary Refill: Capillary refill takes less than 2 seconds. Coloration: Skin is not pale. Findings: No erythema. Neurological:      Mental Status: She is alert and oriented to person, place, and time. Mental status is at baseline. Sensory: No sensory deficit. Motor: No weakness.    Psychiatric:         Mood and Affect: Mood normal.         Behavior: Behavior normal.

## 2020-01-08 ENCOUNTER — OFFICE VISIT (OUTPATIENT)
Dept: FAMILY MEDICINE CLINIC | Age: 55
End: 2020-01-08

## 2020-01-08 VITALS
DIASTOLIC BLOOD PRESSURE: 87 MMHG | SYSTOLIC BLOOD PRESSURE: 135 MMHG | WEIGHT: 225 LBS | HEART RATE: 85 BPM | OXYGEN SATURATION: 95 % | BODY MASS INDEX: 37.49 KG/M2 | HEIGHT: 65 IN | TEMPERATURE: 97.6 F

## 2020-01-08 PROCEDURE — 99212 OFFICE O/P EST SF 10 MIN: CPT | Performed by: STUDENT IN AN ORGANIZED HEALTH CARE EDUCATION/TRAINING PROGRAM

## 2020-01-08 PROCEDURE — 99213 OFFICE O/P EST LOW 20 MIN: CPT | Performed by: STUDENT IN AN ORGANIZED HEALTH CARE EDUCATION/TRAINING PROGRAM

## 2020-01-08 RX ORDER — DOXYCYCLINE HYCLATE 100 MG/1
100 CAPSULE ORAL 2 TIMES DAILY
Qty: 20 CAPSULE | Refills: 0 | Status: SHIPPED | OUTPATIENT
Start: 2020-01-08 | End: 2020-01-18

## 2020-01-08 NOTE — PROGRESS NOTES
[address] [facility]  Department of Family Medicine  Family Medicine Residency Program      Patient:  Siva Henry 48 y.o. female     Date of Service: 1/8/20      Chief complaint:   Chief Complaint   Patient presents with    1 Month Follow-Up    Cough     pt was diagnosed with Bronchitis in the ED recently       History ofPresent Illness   The patient is a 48 y.o. female  presented to the clinic with complaints as above. Cough, Congestion: Patient presents to [facility] for continued symptoms of productive cough, congestion, pleuritic chest pressure, objective fever and chills, poor sleep. Patient states onset of symptoms on 12/31/2019 beginning with fatigue progressing to productive cough and then sinus pressure in forehead. Patient treated with over-the-counter sinus and cold medicin which initially improved symptoms but then again worsened. Patient stating productive cough with progression from thick white to thick yellow sputum and now with red tinge. Patient subjectively states she is not getting better. Patient endorses postnasal drip  Patient does have history of asthma/COPD Stage 3, however has not been using her puffer increasingly, patient does follow with pulmonology. Patient was seen in the emergency department on 1/4 and diagnosed with bronchitis. Patient was sent home with steroids and Tessalon Perls. Patient without recent antibiotic use. Depression: Patient with history of depression with dose of Celexa increased last month to 40 mg. Patient also follows with Dr. Sergey Negrete. Patient states she \"does not want to be working \". Patient works as a . She states her chronic illness adds to her depression. She states no improvement in symptoms with increased dose of Celexa. Patient denies SI or HI.    DIXON: Patient endorses swelling to bilateral ankles and increasing DIXON. Patient states she can walk approximately 100 feet before having to rest on flat surface. Patient denies any chest pain at this occurrence. Patient has previously been worked up and has echo pending. She states she has not had time to have it completed. Patient has 2 pillow orthopnea. Past Medical History:      Diagnosis Date    Adhesive capsulitis of right shoulder     s/p rotator cuff injury and repair; follows with Dr. Breezy Garcia at [facility].  Alcoholism ([facility] [address]     Quit 24 years ago.  Allergic rhinosinusitis     Asthma     Rare use of albuterol.  Bilateral carpal tunnel syndrome 03/26/2018    left OR 10-2-19     COPD (chronic obstructive pulmonary disease) ([facility])     no oxygen, controlled with inhalers     Hidradenitis suppurativa 4/17/2012    Hyperlipidemia     on statin    Intertrigo 4/17/2012    Moderate single current episode of major depressive disorder ([facility] [address] 3/26/2018    Onychomycosis 4/17/2012    Tobacco abuse        Past Surgical History:        Procedure Laterality Date    ANKLE SURGERY      left     CARPAL TUNNEL RELEASE Left 10/2/2019    LEFT ENDOSCOPIC CARPAL TUNNEL RELEASE, LEFT THUMB TRIGGER RELEASE performed by Nathaniel Becerra MD at [facility] COLONOSCOPY  08/02/2018    [address] Poor prep, consider repeat 5-10 years.  FRACTURE SURGERY  1994    Left Tibia    FRACTURE SURGERY      Multiple, different injuries    HERNIA REPAIR  3749    Umbilical hernia    NE COLONOSCOPY FLX DX W/COLLJ SPEC WHEN PFRMD N/A 8/14/2018    COLONOSCOPY DIAGNOSTIC OR SCREENING performed by Essence Mandujano MD at [address] [place] [address] BX N/A 9/11/2018    DILATATION AND CURETTAGE HYSTEROSCOPY performed by Mehreen Hawley MD at [address]  10/23/2012    Dr. Breezy Garcia at [facility].          Medication List:    Current Outpatient Medications   Medication Sig Dispense Refill    doxycycline hyclate (VIBRAMYCIN) 100 MG capsule Take 1 capsule by mouth 2 times daily for 10 days 20 capsule 0    predniSONE (DELTASONE) 10 MG tablet Take 40mg po qd x 5 Occupational History    Occupation: OFF WORK -    Social Needs    Financial resource strain: Not on file    Food insecurity:     Worry: Not on file     Inability: Not on file    Transportation needs:     Medical: Not on file     Non-medical: Not on file   Tobacco Use    Smoking status: Former Smoker     Packs/day: 1.00     Years: 36.00     Pack years: 36.00     Types: Cigarettes     Last attempt to quit: 2018     Years since quittin.6    Smokeless tobacco: Never Used   Substance and Sexual Activity    Alcohol use: No     Alcohol/week: 0.0 standard drinks     Comment: Previous alcoholism, 24 years clean.       Drug use: No    Sexual activity: Not Currently     Partners: Male   Lifestyle    Physical activity:     Days per week: Not on file     Minutes per session: Not on file    Stress: Not on file   Relationships    Social connections:     Talks on phone: Not on file     Gets together: Not on file     Attends Christian service: Not on file     Active member of club or organization: Not on file     Attends meetings of clubs or organizations: Not on file     Relationship status: Not on file    Intimate partner violence:     Fear of current or ex partner: Not on file     Emotionally abused: Not on file     Physically abused: Not on file     Forced sexual activity: Not on file   Other Topics Concern    Not on file   Social History Narrative    Not on file        Family History:       Problem Relation Age of Onset    Cancer Mother 37        CA in shoulder, lungs    High Blood Pressure Mother     Heart Disease Mother     Mental Illness Mother     High Cholesterol Mother     Arthritis Mother     Heart Attack Mother 43    Cancer Father 64        Throat CA    Substance Abuse Father         Alcohol, throat CA    High Blood Pressure Father     High Cholesterol Father     Diabetes Maternal Aunt        Review of Systems:   Review of Systems   Constitutional: Positive for chills, fatigue with COPD exacerbation, history of COPD stage III. Patient will be placed on antibiotic, continue with current steroid regimen to finish up. Continue to monitor and reevaluate in 1 week for improvement. - doxycycline hyclate (VIBRAMYCIN) 100 MG capsule; Take 1 capsule by mouth 2 times daily for 10 days  Dispense: 20 capsule; Refill: 0    2. Depression, unspecified depression type  Follow with Dr. Navneet Berg. Consider outpatient referral for continued care. Consider increasing, adding additional medications for depressive symptoms. Contract for safety. 3. Dyspnea on exertion  Strongly advised to follow-up for echo. Patient given phone number to schedule outpatient testing. Patient verbalized understanding. Return to Office: Return in about 1 week (around 1/15/2020) for FU COPD exacerbation. Diane Munoz MD PGY 2    Counseled regarding above diagnosis, including possible risks and complications,  especially if left uncontrolled. Counseled regarding the possible side effects, risks, benefits and alternatives to treatment; patient and/or guardian verbalizes understanding, agrees, feels comfortable with and wishes to proceed with abovetreatment plan. Call or go to ED immediately if symptoms worsen or persist. Advised patient to call with any new medication issues, and, asapplicable, read all Rx info from pharmacy to assure aware of all possible risks and side effects of medication before taking. Patient and/orguardian given opportunity to ask questions/raise concerns. The patient verbalized comfort and understanding of instructions. I encourage furtherreading and education about your health conditions. Information on many health conditions is provided by the American Academy of Family Physicians: https://familydoctor. org/  Please bring any questions to me at your next visit. This document may have been prepared at least partially through the use of voice recognition software.  Although effort is taken to assure the accuracy of this document, it is possible thatgrammatical, syntax,  or spelling errors may occur.

## 2020-01-13 ASSESSMENT — ENCOUNTER SYMPTOMS
SORE THROAT: 0
EYE DISCHARGE: 0
SINUS PAIN: 1
DIARRHEA: 0
ABDOMINAL PAIN: 0
VOICE CHANGE: 0
CONSTIPATION: 0
NAUSEA: 0
SHORTNESS OF BREATH: 0
RHINORRHEA: 1
VOMITING: 0
SINUS PRESSURE: 1
COUGH: 1

## 2020-01-21 NOTE — PROGRESS NOTES
Behavioral Medicine Notes  Progress Note    Time spent with Patient: 40 minutes  This is patient's first  [facility] appointment. Reason for Consult:  depression and insomnia  Referring Provider: Spenser Mcintyre DO  [address] [facility]  [place], [address] [facility]    Collateral Parties Present: none    Subjective:   Patient referred to address depression and insomnia. She reported a history of depression symptoms over the years that worsened recently with increasing health concerns. She reported that she doesn't sleep or eat and has started to notice weight loss. Patient also described feeling down, having little interest, fatigue, difficulty concentrating, and some thoughts of death. She denied current suicidal ideation, intent, and plan but did report an episode a couple of years ago when she held a gun to her head. At that time, she stated that she decided not to follow through with the suicidal thoughts and sold her gun. In addition to depression symptoms, she feels anxious, restless and is always moving. She also noted a history of anxiety symptoms dating back to childhood. The patient lives with her Belinda Bhatt ex-\" and considers this to be an amicable arrangement. She considers him her best friend. Other sources of social support include her brother and son but she does not have friendships. She stated that she does not like other people and that taking to others is very difficult for her. The patient quit smoking two years ago, only drinks a couple of times per year, denied the use of illicit substances. She does drink 3 cups of coffee each morning, which is down from a couple of pots throughout the day. She has a history of mental health treatment, including counseling for \"anger issues\" many years ago and antidepressant medication. She currently takes Celexa but does not think it helps and was previously on Paxil without benefit.  In addition to her health concerns, she described several stressors that

## 2020-01-22 ENCOUNTER — OFFICE VISIT (OUTPATIENT)
Dept: PSYCHOLOGY | Age: 55
End: 2020-01-22

## 2020-01-22 PROCEDURE — 90791 PSYCH DIAGNOSTIC EVALUATION: CPT | Performed by: PSYCHOLOGIST

## 2020-01-22 ASSESSMENT — ANXIETY QUESTIONNAIRES
4. TROUBLE RELAXING: 3-NEARLY EVERY DAY
6. BECOMING EASILY ANNOYED OR IRRITABLE: 2-OVER HALF THE DAYS
2. NOT BEING ABLE TO STOP OR CONTROL WORRYING: 3-NEARLY EVERY DAY
GAD7 TOTAL SCORE: 18
7. FEELING AFRAID AS IF SOMETHING AWFUL MIGHT HAPPEN: 3-NEARLY EVERY DAY
5. BEING SO RESTLESS THAT IT IS HARD TO SIT STILL: 2-OVER HALF THE DAYS
3. WORRYING TOO MUCH ABOUT DIFFERENT THINGS: 2-OVER HALF THE DAYS
1. FEELING NERVOUS, ANXIOUS, OR ON EDGE: 3-NEARLY EVERY DAY

## 2020-01-22 ASSESSMENT — PATIENT HEALTH QUESTIONNAIRE - PHQ9
SUM OF ALL RESPONSES TO PHQ QUESTIONS 1-9: 24
9. THOUGHTS THAT YOU WOULD BE BETTER OFF DEAD, OR OF HURTING YOURSELF: 1
1. LITTLE INTEREST OR PLEASURE IN DOING THINGS: 3
SUM OF ALL RESPONSES TO PHQ QUESTIONS 1-9: 24
4. FEELING TIRED OR HAVING LITTLE ENERGY: 3
SUM OF ALL RESPONSES TO PHQ9 QUESTIONS 1 & 2: 6
8. MOVING OR SPEAKING SO SLOWLY THAT OTHER PEOPLE COULD HAVE NOTICED. OR THE OPPOSITE, BEING SO FIGETY OR RESTLESS THAT YOU HAVE BEEN MOVING AROUND A LOT MORE THAN USUAL: 3
5. POOR APPETITE OR OVEREATING: 3
3. TROUBLE FALLING OR STAYING ASLEEP: 3
7. TROUBLE CONCENTRATING ON THINGS, SUCH AS READING THE NEWSPAPER OR WATCHING TELEVISION: 3
6. FEELING BAD ABOUT YOURSELF - OR THAT YOU ARE A FAILURE OR HAVE LET YOURSELF OR YOUR FAMILY DOWN: 2
2. FEELING DOWN, DEPRESSED OR HOPELESS: 3

## 2020-01-22 ASSESSMENT — COLUMBIA-SUICIDE SEVERITY RATING SCALE - C-SSRS
4. HAVE YOU HAD THESE THOUGHTS AND HAD SOME INTENTION OF ACTING ON THEM?: YES
2. HAVE YOU ACTUALLY HAD ANY THOUGHTS OF KILLING YOURSELF?: YES
7. DID THIS OCCUR IN THE LAST THREE MONTHS: OVER A YEAR AGO?
5. HAVE YOU STARTED TO WORK OUT OR WORKED OUT THE DETAILS OF HOW TO KILL YOURSELF? DO YOU INTEND TO CARRY OUT THIS PLAN?: NO
1. WITHIN THE PAST MONTH, HAVE YOU WISHED YOU WERE DEAD OR WISHED YOU COULD GO TO SLEEP AND NOT WAKE UP?: YES
6. HAVE YOU EVER DONE ANYTHING, STARTED TO DO ANYTHING, OR PREPARED TO DO ANYTHING TO END YOUR LIFE?: YES
3. HAVE YOU BEEN THINKING ABOUT HOW YOU MIGHT KILL YOURSELF?: NO

## 2020-01-29 ENCOUNTER — TELEPHONE (OUTPATIENT)
Dept: CARDIOLOGY | Age: 55
End: 2020-01-29

## 2020-02-04 NOTE — PROGRESS NOTES
several positive habits, including not watching TV in bed, not using phone, and reading the Bible for a few minutes before trying to sleep. However, she takes a long time to fall asleep and wakes frequently through the night. She also wakes at 3am everyday despite setting alarm for 5. Provided handout on healthy sleep and will discuss further next visit.          Objective:   Appearance    alert, cooperative  Appetite abnormal: decreased  Sleep disturbance Yes  Fatigue Yes  Loss of pleasure Yes  Impulsive behavior No  Speech    normal rate and normal volume  Mood    Anxious  Depressed  Affect    anxiety  Thought Content    intact  Thought Process    linear  Associations    logical connections  Insight    Fair  Judgment    Fair  Orientation    oriented to person, place, time, and general circumstances  Memory    remote memory intact, recent memory impaired  Attention/Concentration    impaired  Ability to understand instructions Yes  Ability to respond meaningfully Yes  Morbid ideation Yes  Suicide Assessment    no suicidal ideation    History:    Medications:   Current Outpatient Medications   Medication Sig Dispense Refill    citalopram (CELEXA) 40 MG tablet Take 1 tablet by mouth daily 90 tablet 1    ibuprofen (ADVIL;MOTRIN) 800 MG tablet Take 800 mg by mouth every 6 hours as needed for Pain      pravastatin (PRAVACHOL) 40 MG tablet Take 1 tablet by mouth daily 90 tablet 1    diclofenac sodium (VOLTAREN) 1 % GEL Apply 2 g topically 4 times daily as needed for Pain 100 g 1    famotidine (PEPCID) 20 MG tablet Take 1 tablet by mouth 2 times daily (Patient taking differently: Take 20 mg by mouth 2 times daily Instructed to take am of procedure) 180 tablet 1    tiotropium (SPIRIVA HANDIHALER) 18 MCG inhalation capsule Inhale 1 capsule into the lungs daily (Patient taking differently: Inhale 18 mcg into the lungs daily Instructed to take am of procedure) 90 capsule 1    ipratropium-albuterol (DUONEB) 0.5-2.5 (3) 20 mg by mouth 2 times daily Instructed to take am of procedure) 180 tablet 1    tiotropium (SPIRIVA HANDIHALER) 18 MCG inhalation capsule Inhale 1 capsule into the lungs daily (Patient taking differently: Inhale 18 mcg into the lungs daily Instructed to take am of procedure) 90 capsule 1    ipratropium-albuterol (DUONEB) 0.5-2.5 (3) MG/3ML SOLN nebulizer solution Inhale 3 mLs into the lungs every 6 hours as needed for Shortness of Breath (Patient taking differently: Inhale 1 vial into the lungs every 6 hours as needed for Shortness of Breath Instructed to take am of procedure and bring) 360 mL 1    albuterol sulfate HFA (PROVENTIL HFA) 108 (90 Base) MCG/ACT inhaler Inhale 2 puffs into the lungs every 6 hours as needed for Wheezing (Patient taking differently: Inhale 2 puffs into the lungs every 6 hours as needed for Wheezing Instructed to take am of procedure and bring) 1 Inhaler 3    loratadine (CLARITIN) 10 MG tablet Take 1 tablet by mouth daily 90 tablet 3    EPINEPHrine (EPIPEN 2-CAMILLE) 0.3 MG/0.3ML SOAJ injection Use as directed for allergic reaction 2 each 1     No current facility-administered medications for this visit. Interventions:  Provided handout on  insomnia, Discussed various factors related to the development and maintenance of  anxiety (including biological, cognitive, behavioral, and environmental factors), Discussed potential treatments for  anxiety and insomnia, Discussed self-care (sleep, nutrition, rewarding activities, social support, exercise), Supportive techniques, Emphasized self-care as important for managing overall health, Provided Psychoeducation re: benefits of forgiveness and Identified maladaptive thoughts        Plan/Recommendations:  1. Participate in behavioral health treatment to learn strategies to improve mood and anxiety, including behavioral activation, cognitive restructuring, mindfulness, and healthy sleep strategies.   2. Read handout on healthy sleep behaviors

## 2020-02-05 ENCOUNTER — OFFICE VISIT (OUTPATIENT)
Dept: PSYCHOLOGY | Age: 55
End: 2020-02-05

## 2020-02-05 PROCEDURE — 90834 PSYTX W PT 45 MINUTES: CPT | Performed by: PSYCHOLOGIST

## 2020-02-05 ASSESSMENT — PATIENT HEALTH QUESTIONNAIRE - PHQ9
8. MOVING OR SPEAKING SO SLOWLY THAT OTHER PEOPLE COULD HAVE NOTICED. OR THE OPPOSITE, BEING SO FIGETY OR RESTLESS THAT YOU HAVE BEEN MOVING AROUND A LOT MORE THAN USUAL: 2
5. POOR APPETITE OR OVEREATING: 2
SUM OF ALL RESPONSES TO PHQ QUESTIONS 1-9: 22
6. FEELING BAD ABOUT YOURSELF - OR THAT YOU ARE A FAILURE OR HAVE LET YOURSELF OR YOUR FAMILY DOWN: 2
SUM OF ALL RESPONSES TO PHQ9 QUESTIONS 1 & 2: 6
1. LITTLE INTEREST OR PLEASURE IN DOING THINGS: 3
SUM OF ALL RESPONSES TO PHQ QUESTIONS 1-9: 22
4. FEELING TIRED OR HAVING LITTLE ENERGY: 3
3. TROUBLE FALLING OR STAYING ASLEEP: 3
2. FEELING DOWN, DEPRESSED OR HOPELESS: 3
7. TROUBLE CONCENTRATING ON THINGS, SUCH AS READING THE NEWSPAPER OR WATCHING TELEVISION: 2
9. THOUGHTS THAT YOU WOULD BE BETTER OFF DEAD, OR OF HURTING YOURSELF: 2

## 2020-02-05 ASSESSMENT — COLUMBIA-SUICIDE SEVERITY RATING SCALE - C-SSRS
6. HAVE YOU EVER DONE ANYTHING, STARTED TO DO ANYTHING, OR PREPARED TO DO ANYTHING TO END YOUR LIFE?: NO
2. HAVE YOU ACTUALLY HAD ANY THOUGHTS OF KILLING YOURSELF?: NO
1. WITHIN THE PAST MONTH, HAVE YOU WISHED YOU WERE DEAD OR WISHED YOU COULD GO TO SLEEP AND NOT WAKE UP?: YES

## 2020-02-05 ASSESSMENT — ANXIETY QUESTIONNAIRES
GAD7 TOTAL SCORE: 20
1. FEELING NERVOUS, ANXIOUS, OR ON EDGE: 3-NEARLY EVERY DAY
6. BECOMING EASILY ANNOYED OR IRRITABLE: 3-NEARLY EVERY DAY
7. FEELING AFRAID AS IF SOMETHING AWFUL MIGHT HAPPEN: 3-NEARLY EVERY DAY
5. BEING SO RESTLESS THAT IT IS HARD TO SIT STILL: 2-OVER HALF THE DAYS
4. TROUBLE RELAXING: 3-NEARLY EVERY DAY
3. WORRYING TOO MUCH ABOUT DIFFERENT THINGS: 3-NEARLY EVERY DAY
2. NOT BEING ABLE TO STOP OR CONTROL WORRYING: 3-NEARLY EVERY DAY

## 2020-02-12 ENCOUNTER — HOSPITAL ENCOUNTER (OUTPATIENT)
Age: 55
Discharge: HOME | End: 2020-02-14

## 2020-02-12 ENCOUNTER — OFFICE VISIT (OUTPATIENT)
Dept: FAMILY MEDICINE CLINIC | Age: 55
End: 2020-02-12

## 2020-02-12 VITALS
BODY MASS INDEX: 37.15 KG/M2 | HEART RATE: 80 BPM | OXYGEN SATURATION: 96 % | HEIGHT: 65 IN | WEIGHT: 223 LBS | DIASTOLIC BLOOD PRESSURE: 74 MMHG | SYSTOLIC BLOOD PRESSURE: 116 MMHG | TEMPERATURE: 97.7 F

## 2020-02-12 LAB
BASOPHILS ABSOLUTE: 0.04 E9/L (ref 0–0.2)
BASOPHILS RELATIVE PERCENT: 0.4 % (ref 0–2)
EOSINOPHILS ABSOLUTE: 0.19 E9/L (ref 0.05–0.5)
EOSINOPHILS RELATIVE PERCENT: 1.9 % (ref 0–6)
HCT VFR BLD CALC: 48.5 % (ref 34–48)
HEMOGLOBIN: 15 G/DL (ref 11.5–15.5)
IMMATURE GRANULOCYTES #: 0.03 E9/L
IMMATURE GRANULOCYTES %: 0.3 % (ref 0–5)
LYMPHOCYTES ABSOLUTE: 2.78 E9/L (ref 1.5–4)
LYMPHOCYTES RELATIVE PERCENT: 28.5 % (ref 20–42)
MCH RBC QN AUTO: 30.9 PG (ref 26–35)
MCHC RBC AUTO-ENTMCNC: 30.9 % (ref 32–34.5)
MCV RBC AUTO: 99.8 FL (ref 80–99.9)
MONOCYTES ABSOLUTE: 0.52 E9/L (ref 0.1–0.95)
MONOCYTES RELATIVE PERCENT: 5.3 % (ref 2–12)
NEUTROPHILS ABSOLUTE: 6.2 E9/L (ref 1.8–7.3)
NEUTROPHILS RELATIVE PERCENT: 63.6 % (ref 43–80)
PDW BLD-RTO: 12.5 FL (ref 11.5–15)
PLATELET # BLD: 374 E9/L (ref 130–450)
PMV BLD AUTO: 9.6 FL (ref 7–12)
RBC # BLD: 4.86 E12/L (ref 3.5–5.5)
WBC # BLD: 9.8 E9/L (ref 4.5–11.5)

## 2020-02-12 PROCEDURE — 99203 OFFICE O/P NEW LOW 30 MIN: CPT | Performed by: STUDENT IN AN ORGANIZED HEALTH CARE EDUCATION/TRAINING PROGRAM

## 2020-02-12 PROCEDURE — 99212 OFFICE O/P EST SF 10 MIN: CPT | Performed by: STUDENT IN AN ORGANIZED HEALTH CARE EDUCATION/TRAINING PROGRAM

## 2020-02-12 PROCEDURE — 36415 COLL VENOUS BLD VENIPUNCTURE: CPT

## 2020-02-12 PROCEDURE — 85025 COMPLETE CBC W/AUTO DIFF WBC: CPT

## 2020-02-12 NOTE — PROGRESS NOTES
S: 48 y.o. female here for bilateral swelling at the base of her neck. Swollen on the right side for 3 months, left side for 3 weeks. More painful on the right than the left. More swelling on left than right. Repetitive lifting and transferring heavy objects. History of bilateral rotator cuff tear. Has cats at home. No fevers. Scratch repeatedly, last time 2 weeks ago. Some swelling in ankle. O: VS: /74 (Site: Left Upper Arm, Position: Sitting, Cuff Size: Medium Adult)   Pulse 80   Temp 97.7 °F (36.5 °C) (Oral)   Ht 5' 5\" (1.651 m)   Wt 223 lb (101.2 kg)   LMP 09/13/2012   SpO2 96%   BMI 37.11 kg/m²    General: NAD, alert and interacting appropriately. CV:  RRR, no gallops, rubs, or murmurs    Resp: CTAB   Ext: Limited range of motion bilateral shoulders 90 degrees flexion no extension 90 degrees abduction active. Bilateral supraclavicular swelling more tender on the right  of the neck. No crepitus    Impression: Swelling at base of the neck with pain due to torticollis versus musculoskeletal versus less likely cellulitis or local reactive lymphadenopathy versus less likely cancer or local tumor versus less likely Boerhaave's  Plan:     Ultrasound neck soft tissue  Cbc, tsh    Attending Physician Statement  I have discussed the case, including pertinent history and exam findings with the resident. I agree with the documented assessment and plan.

## 2020-02-12 NOTE — PROGRESS NOTES
TUNNEL RELEASE, LEFT THUMB TRIGGER RELEASE performed by Lily Nichols MD at [facility] COLONOSCOPY  08/02/2018    [address] Poor prep, consider repeat 5-10 years.  FRACTURE SURGERY  1994    Left Tibia    FRACTURE SURGERY      Multiple, different injuries    HERNIA REPAIR  8182    Umbilical hernia    [place] COLONOSCOPY FLX DX W/COLLJ SPEC WHEN PFRMD N/A 8/14/2018    COLONOSCOPY DIAGNOSTIC OR SCREENING performed by Earl Arriaza MD at [address] [facility] BX N/A 9/11/2018    DILATATION AND CURETTAGE HYSTEROSCOPY performed by Michele Brice MD at [address]  10/23/2012    Dr. Christa Denton at [facility]. Allergies :    Allergies   Allergen Reactions    Bee Venom     Pcn [Penicillins] Nausea And Vomiting       Medication List :    Current Outpatient Medications   Medication Sig Dispense Refill    citalopram (CELEXA) 40 MG tablet Take 1 tablet by mouth daily 90 tablet 1    ibuprofen (ADVIL;MOTRIN) 800 MG tablet Take 800 mg by mouth every 6 hours as needed for Pain      pravastatin (PRAVACHOL) 40 MG tablet Take 1 tablet by mouth daily 90 tablet 1    diclofenac sodium (VOLTAREN) 1 % GEL Apply 2 g topically 4 times daily as needed for Pain 100 g 1    famotidine (PEPCID) 20 MG tablet Take 1 tablet by mouth 2 times daily (Patient taking differently: Take 20 mg by mouth 2 times daily Instructed to take am of procedure) 180 tablet 1    tiotropium (SPIRIVA HANDIHALER) 18 MCG inhalation capsule Inhale 1 capsule into the lungs daily (Patient taking differently: Inhale 18 mcg into the lungs daily Instructed to take am of procedure) 90 capsule 1    ipratropium-albuterol (DUONEB) 0.5-2.5 (3) MG/3ML SOLN nebulizer solution Inhale 3 mLs into the lungs every 6 hours as needed for Shortness of Breath (Patient taking differently: Inhale 1 vial into the lungs every 6 hours as needed for Shortness of Breath Instructed to take am of procedure and bring) 360 mL 1    albuterol atraumatic, no cyanosis, clubbing or  edema. Musculokeletal: Abduction limited to 90 degrees B/L both actively and passively  Lymphnodes: No lymph node enlargement appreciated  Neurologic: Alert&Oriented x3. No focal motor deficits detected. Psychiatric: Normal mood. Normal affect. Normal behavior. ______________________________________________________________________    Assessment & Plan :    1. Localized swelling, mass or lump of neck  · Patient reports with subacute swelling   - US SOFT TISSUE LIMITED AREA; Future  - CBC WITH AUTO DIFFERENTIAL; Future  · Alternating NSAIDs and tylenol recommended for pain    Additional plan and future considerations:    · Would like to provide stretches for adhesive capsulitis    Return to Office: Return in about 2 weeks (around 2/26/2020) for f/u neck swelling.     Sedrick Luke DO   Case discussed with Dr. Karri Orr

## 2020-02-14 ASSESSMENT — ENCOUNTER SYMPTOMS
SHORTNESS OF BREATH: 0
COUGH: 0

## 2020-02-28 ENCOUNTER — HOSPITAL ENCOUNTER (OUTPATIENT)
Dept: ULTRASOUND IMAGING | Age: 55
Discharge: HOME | End: 2020-03-01

## 2020-02-28 PROCEDURE — 76536 US EXAM OF HEAD AND NECK: CPT

## 2020-03-04 ENCOUNTER — OFFICE VISIT (OUTPATIENT)
Dept: FAMILY MEDICINE CLINIC | Age: 55
End: 2020-03-04

## 2020-03-04 VITALS
HEIGHT: 65 IN | TEMPERATURE: 98.3 F | DIASTOLIC BLOOD PRESSURE: 87 MMHG | HEART RATE: 81 BPM | BODY MASS INDEX: 37.82 KG/M2 | OXYGEN SATURATION: 96 % | SYSTOLIC BLOOD PRESSURE: 125 MMHG | WEIGHT: 227 LBS | RESPIRATION RATE: 16 BRPM

## 2020-03-04 PROCEDURE — 99212 OFFICE O/P EST SF 10 MIN: CPT | Performed by: STUDENT IN AN ORGANIZED HEALTH CARE EDUCATION/TRAINING PROGRAM

## 2020-03-04 PROCEDURE — 99213 OFFICE O/P EST LOW 20 MIN: CPT | Performed by: STUDENT IN AN ORGANIZED HEALTH CARE EDUCATION/TRAINING PROGRAM

## 2020-03-05 ENCOUNTER — TELEPHONE (OUTPATIENT)
Dept: FAMILY MEDICINE CLINIC | Age: 55
End: 2020-03-05

## 2020-03-07 ASSESSMENT — ENCOUNTER SYMPTOMS
DIARRHEA: 0
WHEEZING: 0
VOMITING: 0
VOICE CHANGE: 0
SINUS PAIN: 0
CONSTIPATION: 0
SHORTNESS OF BREATH: 1
CHEST TIGHTNESS: 0
ABDOMINAL PAIN: 0
TROUBLE SWALLOWING: 0
SORE THROAT: 0
COLOR CHANGE: 0
BACK PAIN: 0
NAUSEA: 0
RHINORRHEA: 0
COUGH: 0
FACIAL SWELLING: 0
SINUS PRESSURE: 0

## 2020-03-07 NOTE — PROGRESS NOTES
3/4/2020     La Nena Mac (:  1965) is a 48 y.o. female, here for evaluation of the following medical concerns:    HPI    Patient here to review of her results. Previously complaining of neck fullness. She had an ultrasound of her soft tissues which was unremarkable. Patient continues to endorse fullness of her neck and the supraclavicular region. Reports tenderness when she touches the area. Denies restriction of movement. Does endorse that she has gained some weight and may have increased use of her rescue inhaler. Denies constitutional symptoms. She works as a , holdable stop and slow. Does not feel holding as well as dinner only. In regards to her history of this complaint she has had a CT of her chest in 2019 with granulomas and nodule in the thyroid prompting an ultrasound and thyroid workup. 2019 Ultrasound with 1 cm nodule lower pole and a lesion on superficial isthmus on the right. 2019 fine-needle aspirate found to be negative for malignant cells    Review of Systems   Constitutional: Negative for activity change, appetite change, chills, diaphoresis, fatigue, fever and unexpected weight change. HENT: Negative for congestion, dental problem, facial swelling, postnasal drip, rhinorrhea, sinus pressure, sinus pain, sore throat, trouble swallowing and voice change. Respiratory: Positive for shortness of breath. Negative for cough, chest tightness and wheezing. Cardiovascular: Negative for chest pain, palpitations and leg swelling. Gastrointestinal: Negative for abdominal pain, constipation, diarrhea, nausea and vomiting. Genitourinary: Negative for dysuria, frequency, hematuria and urgency. Musculoskeletal: Negative for arthralgias, back pain, gait problem, joint swelling and myalgias. Skin: Negative for color change, pallor, rash and wound. Neurological: Negative for dizziness, syncope, weakness and headaches. Psychiatric/Behavioral: Negative for sleep disturbance and suicidal ideas. The patient is not nervous/anxious. Prior to Visit Medications    Medication Sig Taking?  Authorizing Provider   citalopram (CELEXA) 40 MG tablet Take 1 tablet by mouth daily Yes Isidoro Kussmaul, MD   ibuprofen (ADVIL;MOTRIN) 800 MG tablet Take 800 mg by mouth every 6 hours as needed for Pain Yes Historical Provider, MD   pravastatin (PRAVACHOL) 40 MG tablet Take 1 tablet by mouth daily Yes Leonel Lawson DO   diclofenac sodium (VOLTAREN) 1 % GEL Apply 2 g topically 4 times daily as needed for Pain Yes Leonel Lawson DO   famotidine (PEPCID) 20 MG tablet Take 1 tablet by mouth 2 times daily  Patient taking differently: Take 20 mg by mouth 2 times daily Instructed to take am of procedure Yes Leonel Lawson DO   tiotropium (SPIRIVA HANDIHALER) 18 MCG inhalation capsule Inhale 1 capsule into the lungs daily  Patient taking differently: Inhale 18 mcg into the lungs daily Instructed to take am of procedure Yes Leonel Lawson DO   ipratropium-albuterol (DUONEB) 0.5-2.5 (3) MG/3ML SOLN nebulizer solution Inhale 3 mLs into the lungs every 6 hours as needed for Shortness of Breath  Patient taking differently: Inhale 1 vial into the lungs every 6 hours as needed for Shortness of Breath Instructed to take am of procedure and bring Yes Leonel Lawson DO   albuterol sulfate HFA (PROVENTIL HFA) 108 (90 Base) MCG/ACT inhaler Inhale 2 puffs into the lungs every 6 hours as needed for Wheezing  Patient taking differently: Inhale 2 puffs into the lungs every 6 hours as needed for Wheezing Instructed to take am of procedure and bring Yes Leonel Lawson DO   loratadine (CLARITIN) 10 MG tablet Take 1 tablet by mouth daily Yes Leonel Lawson DO   EPINEPHrine (EPIPEN 2-CAMILLE) 0.3 MG/0.3ML SOAJ injection Use as directed for allergic reaction Yes Leonel Lawson DO        Social History     Tobacco Use    Smoking status: Former Smoker     Packs/day: 1.00     Years: 36.00     Pack years: 36.00     Types: Cigarettes     Last attempt to quit: 2018     Years since quittin.8    Smokeless tobacco: Never Used   Substance Use Topics    Alcohol use: No     Alcohol/week: 0.0 standard drinks     Comment: Previous alcoholism, 24 years clean.        CT Chest 2019       Partial resolution patchy groundglass infiltrates.       Innumerable calcified granulomas are once again identified within the   bilateral lungs, the largest of which is within the right upper lobe   and is shown to measure approximately 8 mm.       Stable focus of nodular scarring within the right lateral costophrenic   angle which shows no worrisome interval growth.        Nodule within the right lobe of thyroid measures approximately 8.5 mm.       US Neck 2019   The finding identified by CT is a lower pole mixed echo 1 cm diameter   lesion representing a TI-RADS Category 4: Moderately suspicious   finding, with the recommendation of follow-up at one, 2, 3, and 5   years as long as the finding remains a centimeter or less.       There is a second similar TI-RADS category 4 nodular lesion in the   superficial aspect of the isthmus on the right, which likewise   measures centimeter in diameter, but ultrasound-guided aspiration   biopsy of this lesion is suggested, based on location and size.           ALERT:  THIS IS AN ABNORMAL REPORT-nodules in the isthmus and lower   pole of the thyroid on the right represent moderately suspicious   findings, and although annual follow-up is recommended, the finding in   the isthmus, due to location, is slightly more of concern, and   ultrasound-guided aspiration biopsy is recommended to establish   histopathology with certainty, given the negligible  morbidity and   discomfort.         US Neck 2020       Unremarkable ultrasound bilateral supraclavicular regions            Vitals:    20 1537   BP: 125/87   Pulse: 81   Resp:

## 2020-03-18 ENCOUNTER — HOSPITAL ENCOUNTER (OUTPATIENT)
Dept: CT IMAGING | Age: 55
Discharge: HOME | End: 2020-03-20

## 2020-03-18 ENCOUNTER — HOSPITAL ENCOUNTER (OUTPATIENT)
Age: 55
Discharge: HOME | End: 2020-03-18

## 2020-03-18 LAB
ANION GAP SERPL CALCULATED.3IONS-SCNC: 8 MMOL/L (ref 7–16)
BUN BLDV-MCNC: 10 MG/DL (ref 6–20)
CALCIUM SERPL-MCNC: 9.7 MG/DL (ref 8.6–10.2)
CHLORIDE BLD-SCNC: 103 MMOL/L (ref 98–107)
CO2: 29 MMOL/L (ref 22–29)
CREAT SERPL-MCNC: 0.7 MG/DL (ref 0.5–1)
GFR AFRICAN AMERICAN: >60
GFR NON-AFRICAN AMERICAN: >60 ML/MIN/1.73
GLUCOSE BLD-MCNC: 110 MG/DL (ref 74–99)
POTASSIUM SERPL-SCNC: 4.4 MMOL/L (ref 3.5–5)
SODIUM BLD-SCNC: 140 MMOL/L (ref 132–146)
TSH SERPL DL<=0.05 MIU/L-ACNC: 1.4 UIU/ML (ref 0.27–4.2)

## 2020-03-18 PROCEDURE — 84443 ASSAY THYROID STIM HORMONE: CPT

## 2020-03-18 PROCEDURE — 36415 COLL VENOUS BLD VENIPUNCTURE: CPT

## 2020-03-18 PROCEDURE — 6360000004 HC RX CONTRAST MEDICATION: Performed by: RADIOLOGY

## 2020-03-18 PROCEDURE — 80048 BASIC METABOLIC PNL TOTAL CA: CPT

## 2020-03-18 PROCEDURE — 71250 CT THORAX DX C-: CPT

## 2020-03-18 PROCEDURE — 70491 CT SOFT TISSUE NECK W/DYE: CPT

## 2020-03-18 RX ADMIN — IOPAMIDOL 90 ML: 755 INJECTION, SOLUTION INTRAVENOUS at 08:24

## 2020-04-01 ENCOUNTER — OFFICE VISIT (OUTPATIENT)
Dept: FAMILY MEDICINE CLINIC | Age: 55
End: 2020-04-01

## 2020-04-01 VITALS
BODY MASS INDEX: 37.99 KG/M2 | TEMPERATURE: 98.3 F | WEIGHT: 228 LBS | HEIGHT: 65 IN | SYSTOLIC BLOOD PRESSURE: 129 MMHG | HEART RATE: 95 BPM | DIASTOLIC BLOOD PRESSURE: 83 MMHG | OXYGEN SATURATION: 97 %

## 2020-04-01 PROCEDURE — 99213 OFFICE O/P EST LOW 20 MIN: CPT | Performed by: STUDENT IN AN ORGANIZED HEALTH CARE EDUCATION/TRAINING PROGRAM

## 2020-04-01 RX ORDER — LORATADINE 10 MG/1
10 TABLET ORAL DAILY
Qty: 90 TABLET | Refills: 3 | Status: SHIPPED | OUTPATIENT
Start: 2020-04-01

## 2020-04-01 ASSESSMENT — ENCOUNTER SYMPTOMS
SHORTNESS OF BREATH: 1
CONSTIPATION: 0
TROUBLE SWALLOWING: 1
NAUSEA: 0
DIARRHEA: 0
VOMITING: 0
WHEEZING: 0
CHEST TIGHTNESS: 0
ABDOMINAL PAIN: 0
COUGH: 0
BACK PAIN: 0
SORE THROAT: 0
CHOKING: 0

## 2020-08-02 ENCOUNTER — TELEPHONE (OUTPATIENT)
Dept: FAMILY MEDICINE CLINIC | Age: 55
End: 2020-08-02

## 2020-08-03 ENCOUNTER — HOSPITAL ENCOUNTER (OUTPATIENT)
Dept: GENERAL RADIOLOGY | Age: 55
Discharge: HOME | End: 2020-08-05

## 2020-08-03 ENCOUNTER — OFFICE VISIT (OUTPATIENT)
Dept: FAMILY MEDICINE CLINIC | Age: 55
End: 2020-08-03

## 2020-08-03 ENCOUNTER — HOSPITAL ENCOUNTER (OUTPATIENT)
Age: 55
Discharge: HOME | End: 2020-08-05

## 2020-08-03 VITALS
DIASTOLIC BLOOD PRESSURE: 86 MMHG | HEIGHT: 65 IN | OXYGEN SATURATION: 96 % | HEART RATE: 86 BPM | SYSTOLIC BLOOD PRESSURE: 138 MMHG | WEIGHT: 231 LBS | BODY MASS INDEX: 38.49 KG/M2 | TEMPERATURE: 98.1 F

## 2020-08-03 PROCEDURE — 99212 OFFICE O/P EST SF 10 MIN: CPT | Performed by: STUDENT IN AN ORGANIZED HEALTH CARE EDUCATION/TRAINING PROGRAM

## 2020-08-03 PROCEDURE — 73610 X-RAY EXAM OF ANKLE: CPT

## 2020-08-03 ASSESSMENT — ENCOUNTER SYMPTOMS
BACK PAIN: 1
COLOR CHANGE: 1

## 2020-08-03 NOTE — PROGRESS NOTES
[facility] MEDICINE RESIDENCY PROGRAM  DATE OF VISIT : 8/3/2020    Patient : Yamileth Cloud   Age : 48 y.o.  : 1965   MRN : [id number]   ______________________________________________________________________    Chief Complaint :   Chief Complaint   Patient presents with    Ankle Pain       HPI : Yamileth Cloud is 48 y.o. female who presented to the clinic today for L sided ankle pain that started on 2020 after stepping in a hole. Patient believes she also did a twisting motion when it occurred. She reports immediate pain, swelling, and some bruising. Patient also says she initially had numbness in her toes, but it resolved yesterday when she did not put on shoes all day. She did report some nausea with the pain, more so than baseline. Hx is significant for L sided ankle surgery in  with hardware still in place. Patient can bear weight, but states it is painful, the step off worse than putting her foot down. Past Medical History :  Past Medical History:   Diagnosis Date    Adhesive capsulitis of right shoulder     s/p rotator cuff injury and repair; follows with Dr. Toshia Kowalski at [facility].  Alcoholism ([facility] [address]     Quit 24 years ago.  Allergic rhinosinusitis     Asthma     Rare use of albuterol.  Bilateral carpal tunnel syndrome 2018    left OR 10-2-19     COPD (chronic obstructive pulmonary disease) (HCC)     no oxygen, controlled with inhalers     Hidradenitis suppurativa 2012    Hyperlipidemia     on statin    Intertrigo 2012    Moderate single current episode of major depressive disorder ([facility] [address] 3/26/2018    Onychomycosis 2012    Tobacco abuse      Past Surgical History:   Procedure Laterality Date    ANKLE SURGERY      left     CARPAL TUNNEL RELEASE Left 10/2/2019    LEFT ENDOSCOPIC CARPAL TUNNEL RELEASE, LEFT THUMB TRIGGER RELEASE performed by Delicia Salmeron MD at [facility]  2018    [address]   Poor prep, Shortness of Breath Instructed to take am of procedure and bring) 360 mL 1    diclofenac sodium (VOLTAREN) 1 % GEL Apply 2 g topically 4 times daily as needed for Pain 100 g 1     No current facility-administered medications for this visit. Review of Systems :  Review of Systems   Constitutional: Negative for chills and fever. Musculoskeletal: Positive for back pain (lower), gait problem and joint swelling (L ankle). Reports: L ankle pain  Denies: hot/cold sensation of foot, motor function loss   Skin: Positive for color change (lateral aspect L foot, slight darkening of skin). Neurological: Negative for numbness. ______________________________________________________________________    Physical Exam :    Vitals: /86 (Site: Left Upper Arm, Position: Sitting, Cuff Size: Large Adult)   Pulse 86   Temp 98.1 °F (36.7 °C) (Temporal)   Ht 5' 5\" (1.651 m)   Wt 231 lb (104.8 kg)   LMP 09/13/2012   SpO2 96%   BMI 38.44 kg/m²   General Appearance: Well developed, awake, alert, oriented, and in NAD  HEENT: NCAT, MMM, no pallor or icterus. Neck: Symmetrical, trachea midline. Chest wall/Lung: CTAB, respirations unlabored. No ronchi/wheezing/rales  Heart: RRR, normal S1 and S2, no murmurs, rubs or gallops. Extremities: L ankle with chronic decreased ROM compared to R, slight discoloration to L lateral dorsal aspect of foot, slight swelling, tenderness over lateral cuniform and cuboid vs 5th proximal metarsal  Skin: Skin color, texture, turgor normal, no rashes or lesions  Musculokeletal: ROM chronically decreased in L ankle compared to R  Neurologic: Alert&Oriented x3. No focal motor deficits detected. Psychiatric: Normal mood. Normal affect. Normal behavior. ______________________________________________________________________    Assessment & Plan :    1.  Sprain of left ankle, unspecified ligament, initial encounter  · Difficult to discern if pain is located over L cuboid bone or base of 5th metatarsal, making [place] criteria \"cannot rule out\"  · Ordered:  - XR ANKLE LEFT (MIN 3 VIEWS); Future    Additional plan and future considerations:       Return to Office: Return worsening or no change in symptoms.     Keegan White DO   Case discussed with Dr. Zuly Box

## 2020-08-03 NOTE — LETTER
[facility]  [address] [id number]  Phone: [phone]  Fax: [phone], DO        August 3, 2020     Patient: Aleksandra Silva   YOB: 1965   Date of Visit: 8/3/2020       To Whom It May Concern: It is my medical opinion that Tara Bae may return to light duty immediately with the following restrictions: lifting/carrying not to exceed 20 lbs. , and to avoid standing as possible for the next 3 days. .    If you have any questions or concerns, please don't hesitate to call.     Sincerely,        [place] DO Chip

## 2020-08-04 ENCOUNTER — TELEPHONE (OUTPATIENT)
Dept: FAMILY MEDICINE CLINIC | Age: 55
End: 2020-08-04

## 2020-08-05 ENCOUNTER — TELEPHONE (OUTPATIENT)
Dept: FAMILY MEDICINE CLINIC | Age: 55
End: 2020-08-05

## 2020-08-05 NOTE — TELEPHONE ENCOUNTER
The patient is requesting a note stating she can return to work with  No restrictions.   She states she cant do light duty at her job as they dont have any light duty so she wants to return to work with no restrictions  Please advise

## 2020-08-10 ENCOUNTER — OFFICE VISIT (OUTPATIENT)
Dept: FAMILY MEDICINE CLINIC | Age: 55
End: 2020-08-10

## 2020-08-10 VITALS
RESPIRATION RATE: 18 BRPM | WEIGHT: 231 LBS | DIASTOLIC BLOOD PRESSURE: 86 MMHG | OXYGEN SATURATION: 98 % | SYSTOLIC BLOOD PRESSURE: 126 MMHG | HEART RATE: 86 BPM | HEIGHT: 65 IN | BODY MASS INDEX: 38.49 KG/M2 | TEMPERATURE: 98.5 F

## 2020-08-10 PROBLEM — Z78.0 POSTMENOPAUSAL: Status: ACTIVE | Noted: 2020-08-10

## 2020-08-10 PROCEDURE — 99212 OFFICE O/P EST SF 10 MIN: CPT | Performed by: FAMILY MEDICINE

## 2020-08-10 PROCEDURE — 99214 OFFICE O/P EST MOD 30 MIN: CPT | Performed by: FAMILY MEDICINE

## 2020-08-10 NOTE — PROGRESS NOTES
CC:  Follow up left ankle pain    HPI:  48 y.o. female presents for follow up. Using brace on left ankle. XR stable. Plans to get air cast.  Has PT planned, then vacation. Has Ace wrap at home. Followed with ortho; has hardware in place. XR stable overall but with significant degenerative changes, as discussed. She will try the Air cast and PT, then call with symptoms. Her right anterior shin is painful at times, worse since left ankle has been hurting. She will follow this and seek care if symptoms persist/worsen, and/or new symptoms develop. May be related to altered ambulation due to left ankle pain. Currently able to do her job as a . Not standing as much. Flagging only 3-4 hours. Able to drive without difficulty. Osteopenia noted on XR. Eats a lot of dairy and vegetables. Postmenopausal.  No bleeding for at least a year. Former smoker. Forgetful at times, described as difficulty getting out the right words that she is looking for. This happens intermittently and sporadically, but has happened a couple of times, and may be worse when she is feeling stressed or anxious. Comes and goes. No other memory issues; able to describe in detail the situations and the words she had been trying to find at the time. Breathing symptoms stable. Would like to discuss Lamisil or toenail removal for diffuse nail changes.        Patient Active Problem List    Diagnosis Date Noted    Thyroid nodule 04/03/2019     Priority: High    Pure hypercholesterolemia 05/08/2019    Gastroesophageal reflux disease 04/15/2019    Tinnitus of both ears 02/11/2019    Fall down stairs 12/10/2018    PMB (postmenopausal bleeding) 09/06/2018    Shortness of breath     Pulmonary nodules 05/16/2018    Bee sting allergy 05/04/2018    Post-traumatic arthritis of left ankle 04/24/2018    Bilateral carpal tunnel syndrome 03/26/2018    Moderate single current episode of major depressive disorder ([facility] [address] 03/26/2018    Biceps tendonitis of both shoulders 05/12/2016    Right shoulder pain 11/05/2013    Hidradenitis suppurativa 04/17/2012    Intertrigo 04/17/2012    Onychomycosis 04/17/2012    Adhesive capsulitis of right shoulder     Hypertension     Tobacco abuse     COPD (chronic obstructive pulmonary disease) (HCC)     Allergic rhinosinusitis     Rotator cuff (capsule) sprain 07/01/2011       Current Outpatient Medications on File Prior to Visit   Medication Sig Dispense Refill    EPINEPHrine (EPIPEN 2-CAMILLE) 0.3 MG/0.3ML SOAJ injection Use as directed for allergic reaction 2 each 1    albuterol sulfate HFA (PROVENTIL HFA) 108 (90 Base) MCG/ACT inhaler Inhale 2 puffs into the lungs every 6 hours as needed for Wheezing 1 Inhaler 3    pravastatin (PRAVACHOL) 40 MG tablet Take 1 tablet by mouth daily 90 tablet 1    loratadine (CLARITIN) 10 MG tablet Take 1 tablet by mouth daily 90 tablet 3    citalopram (CELEXA) 40 MG tablet Take 1 tablet by mouth daily 90 tablet 1    famotidine (PEPCID) 20 MG tablet Take 1 tablet by mouth 2 times daily (Patient taking differently: Take 20 mg by mouth 2 times daily Instructed to take am of procedure) 180 tablet 1    tiotropium (SPIRIVA HANDIHALER) 18 MCG inhalation capsule Inhale 1 capsule into the lungs daily (Patient taking differently: Inhale 18 mcg into the lungs daily Instructed to take am of procedure) 90 capsule 1    ipratropium-albuterol (DUONEB) 0.5-2.5 (3) MG/3ML SOLN nebulizer solution Inhale 3 mLs into the lungs every 6 hours as needed for Shortness of Breath (Patient taking differently: Inhale 1 vial into the lungs every 6 hours as needed for Shortness of Breath Instructed to take am of procedure and bring) 360 mL 1     No current facility-administered medications on file prior to visit.         Allergies   Allergen Reactions    Bee Venom     Pcn [Penicillins] Nausea And Vomiting       Social History     Tobacco Use    Smoking status: Former Smoker Packs/day: 1.00     Years: 36.00     Pack years: 36.00     Types: Cigarettes     Last attempt to quit: 2018     Years since quittin.2    Smokeless tobacco: Never Used   Substance Use Topics    Alcohol use: No     Alcohol/week: 0.0 standard drinks     Comment: Previous alcoholism, 24 years clean.  Drug use: No       ROS:   Review of Systems - as above    Gen No F/C   CV no CP or SOB   Resp no new cough or wheeze, controlled   GI no D/C recently. May be a little constipated, but will call if any issues.  no bleeding or spotting at all, no urinary symptoms      Physical Exam:    VS:  Blood pressure 126/86, pulse 86, temperature 98.5 °F (36.9 °C), temperature source Oral, resp. rate 18, height 5' 5\" (1.651 m), weight 231 lb (104.8 kg), last menstrual period 2012, SpO2 98 %, not currently breastfeeding. General Appearance:  awake, alert, oriented, in no acute distress  Skin:  Keratotic lesion on left dorsal forearm, 2-3 mm, flesh colored, firm   Head/face:  NCAT  Eyes:  EOMI and Sclera nonicteric  Neck:  neck- supple, no mass, non-tender  Lungs:  Normal expansion. Clear to auscultation. No rales, rhonchi, or wheezing. Heart:  Heart regular rate and rhythm  Abdomen:  Soft, NT, ND  Extremities: trace pedal edema at left ankle in area of previous surgery, anterior aspect, distal tibia near talus. No erythema. No calf tenderness bilaterally. Toenails show diffuse thickening, whitish yellow discoloration, and curvature. Assessments:      Diagnosis Orders   1. Post-traumatic arthritis of left ankle     2. Postmenopausal, possible osteopenia/osteoporosis  DEXA BONE DENSITY 2 SITES   3. Onychomycosis  Catrachito Ramirez DPM, Podiatry, [place] ([place])   4. Encounter for screening for malignant neoplasm of breast  CUCO OBIE DIGITAL SCREEN BILATERAL   5.  Memory change  CBC    Comprehensive Metabolic Panel    TSH without Reflex    Lipid Panel    VITAMIN B12 & FOLATE    Vitamin D 25 Hydroxy Plans: As Above. Advised shave biopsy of left forearm lesion if persists; possible verrucous lesion vs SCC vs other. Please see Patient Instructions for further counseling and information given. RTO 4 weeks or sooner prn. Lab visit prior to next OV. Offered shave biopsy, as above. She will consider this. Follow lesion for growth and change. Supportive brace for left ankle, PT, and follow symptoms for improvement. Ordered labs. Check thyroid, B12, etc.  Follow for further symptoms of memory change and difficulty in finding words. She understands and will follow. Advised to please be adherent to the treatment plans discussed today, and please call with any questions or concerns, letting the office know of any reasons that the plans may not be followed. The risks of untreated conditions include worsening illness, injury, disability, and possibly, death. Please call if symptoms change in any way, worsen, or fail to completely resolve, as this could necessitate a change to treatment plans. Patient and/or caregiver expressed understanding. Indications and proper use of medication(s) reviewed. Potential side-effects and risks of medication(s) also explained. Patient and/or caregiver was instructed to call if any new symptoms develop prior to next visit.

## 2020-11-25 ENCOUNTER — HOSPITAL ENCOUNTER (OUTPATIENT)
Age: 55
Discharge: HOME | End: 2020-11-27

## 2020-11-25 ENCOUNTER — OFFICE VISIT (OUTPATIENT)
Dept: FAMILY MEDICINE CLINIC | Age: 55
End: 2020-11-25

## 2020-11-25 ENCOUNTER — HOSPITAL ENCOUNTER (OUTPATIENT)
Dept: GENERAL RADIOLOGY | Age: 55
Discharge: HOME | End: 2020-11-27

## 2020-11-25 VITALS
DIASTOLIC BLOOD PRESSURE: 88 MMHG | BODY MASS INDEX: 37.82 KG/M2 | TEMPERATURE: 97.3 F | HEIGHT: 65 IN | HEART RATE: 87 BPM | OXYGEN SATURATION: 97 % | SYSTOLIC BLOOD PRESSURE: 149 MMHG | WEIGHT: 227 LBS

## 2020-11-25 PROCEDURE — 99213 OFFICE O/P EST LOW 20 MIN: CPT | Performed by: STUDENT IN AN ORGANIZED HEALTH CARE EDUCATION/TRAINING PROGRAM

## 2020-11-25 PROCEDURE — 99212 OFFICE O/P EST SF 10 MIN: CPT | Performed by: STUDENT IN AN ORGANIZED HEALTH CARE EDUCATION/TRAINING PROGRAM

## 2020-11-25 PROCEDURE — 73100 X-RAY EXAM OF WRIST: CPT

## 2020-11-25 ASSESSMENT — ENCOUNTER SYMPTOMS
SORE THROAT: 0
VOMITING: 0
NAUSEA: 0
ABDOMINAL DISTENTION: 0
BACK PAIN: 0
COUGH: 0
EYE DISCHARGE: 0
WHEEZING: 0
EYE ITCHING: 0

## 2020-11-25 NOTE — PROGRESS NOTES
carpal tunnel syndrome 03/26/2018    left OR 10-2-19     COPD (chronic obstructive pulmonary disease) ([facility])     no oxygen, controlled with inhalers     Hidradenitis suppurativa 4/17/2012    Hyperlipidemia     on statin    Intertrigo 4/17/2012    Moderate single current episode of major depressive disorder ([facility] [address] 3/26/2018    Onychomycosis 4/17/2012    Tobacco abuse        Current Outpatient Medications on File Prior to Visit   Medication Sig Dispense Refill    EPINEPHrine (EPIPEN 2-CAMILLE) 0.3 MG/0.3ML SOAJ injection Use as directed for allergic reaction 2 each 1    albuterol sulfate HFA (PROVENTIL HFA) 108 (90 Base) MCG/ACT inhaler Inhale 2 puffs into the lungs every 6 hours as needed for Wheezing 1 Inhaler 3    pravastatin (PRAVACHOL) 40 MG tablet Take 1 tablet by mouth daily 90 tablet 1    loratadine (CLARITIN) 10 MG tablet Take 1 tablet by mouth daily 90 tablet 3    citalopram (CELEXA) 40 MG tablet Take 1 tablet by mouth daily 90 tablet 1    famotidine (PEPCID) 20 MG tablet Take 1 tablet by mouth 2 times daily (Patient taking differently: Take 20 mg by mouth 2 times daily Instructed to take am of procedure) 180 tablet 1    tiotropium (SPIRIVA HANDIHALER) 18 MCG inhalation capsule Inhale 1 capsule into the lungs daily (Patient taking differently: Inhale 18 mcg into the lungs daily Instructed to take am of procedure) 90 capsule 1    ipratropium-albuterol (DUONEB) 0.5-2.5 (3) MG/3ML SOLN nebulizer solution Inhale 3 mLs into the lungs every 6 hours as needed for Shortness of Breath (Patient taking differently: Inhale 1 vial into the lungs every 6 hours as needed for Shortness of Breath Instructed to take am of procedure and bring) 360 mL 1     No current facility-administered medications on file prior to visit.         Allergies   Allergen Reactions    Bee Venom     Pcn [Penicillins] Nausea And Vomiting       Family History   Problem Relation Age of Onset    Cancer Mother 37        CA in shoulder, lungs  High Blood Pressure Mother     Heart Disease Mother     Mental Illness Mother     High Cholesterol Mother     Arthritis Mother     Heart Attack Mother 43    Cancer Father 64        Throat CA    Substance Abuse Father         Alcohol, throat CA    High Blood Pressure Father     High Cholesterol Father     Diabetes Maternal Aunt        Past Surgical History:   Procedure Laterality Date    ANKLE SURGERY      left     CARPAL TUNNEL RELEASE Left 10/2/2019    LEFT ENDOSCOPIC CARPAL TUNNEL RELEASE, LEFT THUMB TRIGGER RELEASE performed by Leela Roca MD at [facility] COLONOSCOPY  2018    [address] Poor prep, consider repeat 5-10 years.  FRACTURE SURGERY      Left Tibia    FRACTURE SURGERY      Multiple, different injuries    HERNIA REPAIR  5198    Umbilical hernia    NE COLONOSCOPY FLX DX W/COLLJ SPEC WHEN PFRMD N/A 2018    COLONOSCOPY DIAGNOSTIC OR SCREENING performed by Cheli Palacios MD at [facility] BX N/A 2018    DILATATION AND CURETTAGE HYSTEROSCOPY performed by Jed Garcia MD at [address] [place] [address]  10/23/2012    Dr. Jaxon Newsome at [facility]. Social History     Tobacco Use    Smoking status: Former Smoker     Packs/day: 1.00     Years: 36.00     Pack years: 36.00     Types: Cigarettes     Last attempt to quit: 2018     Years since quittin.5    Smokeless tobacco: Never Used   Substance Use Topics    Alcohol use: No     Alcohol/week: 0.0 standard drinks     Comment: Previous alcoholism, 24 years clean.  Drug use: No       ROS:    Review of Systems   Constitutional: Negative for activity change, appetite change, chills, fever and unexpected weight change. HENT: Negative for congestion and sore throat. Eyes: Negative for discharge and itching. Respiratory: Negative for cough and wheezing. Cardiovascular: Negative for chest pain and palpitations.    Gastrointestinal: Negative for abdominal distention, nausea and vomiting. Genitourinary: Negative for difficulty urinating and dysuria. Musculoskeletal: Negative for back pain, gait problem and joint swelling. Skin: Negative for pallor and rash. Neurological: Positive for numbness. Negative for dizziness, light-headedness and headaches. Psychiatric/Behavioral: Negative for agitation and confusion. Objective:    VS:  Blood pressure (!) 149/88, pulse 87, temperature 97.3 °F (36.3 °C), temperature source Temporal, height 5' 5\" (1.651 m), weight 227 lb (103 kg), last menstrual period 09/13/2012, SpO2 97 %, not currently breastfeeding. Physical Exam   Constitutional: She appears well-developed and well-nourished. HENT:   Head: Normocephalic and atraumatic. Pulmonary/Chest: Effort normal and breath sounds normal. She has no wheezes. She has no rales. Abdominal: Soft. Bowel sounds are normal. There is no abdominal tenderness. There is no rebound. Musculoskeletal:        Arms:       Comments: +TTP base of first metarcarpal joint as seen in image. Positive finklesteins test. +TTP anatomic snuffbox. Negative Tinel Phalen sign.  strength intact.  strength 5/5. Thumb opposition elicits mild pain. ROM fingers wrists hands intact. Neurological: She is alert. She displays normal reflexes. Skin: Skin is warm and dry. Assessment:    Pain likely 2/2 DeQuervain vs. Radial Nerve Entrapment vs. Muscular Strain    Trial of naproxen, patient states she has it at home already. Advised her to get the splint that mobilizes the thumb until imaging x-rays of bilateral hands return. She has declined physical therapy, occupational therapy. We will follow-up in 2 weeks if there is no change in symptoms consider a knee injection steroid at that time. HM: flu shot counseling given, patient declined flu shot. Plans:  As above. RTO in 2 weeks. Please see Patient Instructions for further counseling and information given. Advised to please be adherent to the treatment plans discussed today, and please call with any questions or concerns, letting the office know of any reasons that the plans may not be followed. The risks of untreated conditions include worsening illness, injury, disability, and possibly, death. Please call if symptoms change in any way, worsen, or fail to completely resolve, as this could necessitate a change to treatment plans. Patient and/or caregiver expressed understanding. Indications and proper use of medication(s) reviewed. Potential side-effects and risks of medication(s) also explained. Patient and/or caregiver was instructed to call if any new symptoms develop prior to next visit. Health risk factors discussed and addressed.

## 2020-11-25 NOTE — PROGRESS NOTES
S: 50 y.o. female here for b/l hand, wrist and thumb pain. B/l CTS. S/p CT release on L. Onset wrist pain 2 wks ago. No trauma. Pain is distal to base of first metacarpal. Numbness over base of 1st metacarpal. Nonradiating. Swelling but no erythema. No weakness. Worse on R. Worse w/ holding steering wheel for long periods. No EtOH or seafood. O: VS: BP (!) 149/88 (Site: Right Upper Arm, Position: Sitting, Cuff Size: Medium Adult)   Pulse 87   Temp 97.3 °F (36.3 °C) (Temporal)   Ht 5' 5\" (1.651 m)   Wt 227 lb (103 kg)   LMP 09/13/2012   SpO2 97%   BMI 37.77 kg/m²    General: NAD, alert and interacting appropriately. Ext:  Slightly warm and ttp over base of 1st metacarpal. Pain w/ thumb opposition. Neg tinnels at CT.  intact. Impression: wrist/thumb pain 2/2 deQuervain vs radial nerve entrapment vs muscle strain vs ligament sprain > gout   Plan:   Xray b/l wrist, r/o scaphoid fx  B/l thumb spica braces  Naproxen, RICE  rtc 2 wks for wrist/thumb pain      Attending Physician Statement  I have discussed the case, including pertinent history and exam findings with the resident. I agree with the documented assessment and plan.

## 2020-11-26 DIAGNOSIS — M25.531 PAIN IN BOTH WRISTS: ICD-10-CM

## 2020-11-26 DIAGNOSIS — M79.644 THUMB PAIN, RIGHT: Primary | ICD-10-CM

## 2020-11-26 DIAGNOSIS — M25.532 PAIN IN BOTH WRISTS: ICD-10-CM

## 2021-04-13 ENCOUNTER — IMMUNIZATION (OUTPATIENT)
Dept: PRIMARY CARE CLINIC | Age: 56
End: 2021-04-13

## 2021-04-13 PROCEDURE — 91300 COVID-19, PFIZER VACCINE 30MCG/0.3ML DOSE: CPT | Performed by: NURSE PRACTITIONER

## 2021-04-13 PROCEDURE — 0001A COVID-19, PFIZER VACCINE 30MCG/0.3ML DOSE: CPT | Performed by: NURSE PRACTITIONER

## 2021-05-06 ENCOUNTER — IMMUNIZATION (OUTPATIENT)
Dept: PRIMARY CARE CLINIC | Age: 56
End: 2021-05-06

## 2021-05-06 PROCEDURE — 91300 COVID-19, PFIZER VACCINE 30MCG/0.3ML DOSE: CPT | Performed by: NURSE PRACTITIONER

## 2021-05-06 PROCEDURE — 0002A COVID-19, PFIZER VACCINE 30MCG/0.3ML DOSE: CPT | Performed by: NURSE PRACTITIONER

## 2021-08-27 ENCOUNTER — OFFICE VISIT (OUTPATIENT)
Dept: FAMILY MEDICINE CLINIC | Age: 56
End: 2021-08-27

## 2021-08-27 VITALS
TEMPERATURE: 98.2 F | WEIGHT: 220 LBS | BODY MASS INDEX: 36.65 KG/M2 | HEIGHT: 65 IN | OXYGEN SATURATION: 96 % | HEART RATE: 87 BPM | SYSTOLIC BLOOD PRESSURE: 126 MMHG | DIASTOLIC BLOOD PRESSURE: 63 MMHG

## 2021-08-27 DIAGNOSIS — R59.1 LYMPHADENOPATHY: ICD-10-CM

## 2021-08-27 DIAGNOSIS — J35.1 ENLARGEMENT OF TONSILS: ICD-10-CM

## 2021-08-27 DIAGNOSIS — Z91.030 BEE STING ALLERGY: ICD-10-CM

## 2021-08-27 DIAGNOSIS — R22.2 SUBCUTANEOUS MASS OF SUPRACLAVICULAR AREA: Primary | ICD-10-CM

## 2021-08-27 PROCEDURE — 99213 OFFICE O/P EST LOW 20 MIN: CPT | Performed by: STUDENT IN AN ORGANIZED HEALTH CARE EDUCATION/TRAINING PROGRAM

## 2021-08-27 PROCEDURE — 99212 OFFICE O/P EST SF 10 MIN: CPT | Performed by: STUDENT IN AN ORGANIZED HEALTH CARE EDUCATION/TRAINING PROGRAM

## 2021-08-27 RX ORDER — EPINEPHRINE 0.3 MG/.3ML
INJECTION SUBCUTANEOUS
Qty: 2 EACH | Refills: 1 | Status: SHIPPED | OUTPATIENT
Start: 2021-08-27

## 2021-08-27 NOTE — LETTER
[facility]  [address] [address]  Phone: [phone]  Fax: [phone]    Reyes Flint, MD        August 27, 2021     Patient: Gail Nolasco   YOB: 1965   Date of Visit: 8/27/2021       To Whom it May Concern:    Radha Becerra was seen in my clinic on 8/27/2021. Patient states she wishes to stay with the same partners at work. If you have any questions or concerns, please don't hesitate to call.     Sincerely,         Reyes Flint, MD

## 2021-08-27 NOTE — PROGRESS NOTES
S: 50 y.o. female here for b/l supraclavicular LAD the size of golf balls. The LN on R has been present for over a year. CT of neck and US of soft tissue showed tonsillar abscess. Referred to ENT, but never saw. Dysphagia, often triggering cough and seems to choke. No fever. No B sxs. Lump on leg starting on pop fossa and moving toward thigh. O: VS: /63 (Site: Left Upper Arm, Position: Sitting, Cuff Size: Large Adult)   Pulse 87   Temp 98.2 °F (36.8 °C) (Temporal)   Ht 5' 5\" (1.651 m)   Wt 220 lb (99.8 kg)   LMP 09/13/2012   SpO2 96%   BMI 36.61 kg/m²    General: NAD, alert and interacting appropriately. HEENT: L tonsillar hypertrophy. golf ball sized ttp fixed w/ warmth supraclavicular   CV:  RRR, no gallops, rubs, or murmurs    Resp: CTAB   Abd:  Soft, nontender   Ext:  No edema. No mass appreciable. Impression: supraclavicular LAD  Plan:   Get into ENT  Neck US and Bx  Labs. Attending Physician Statement  I have discussed the case, including pertinent history and exam findings with the resident. I agree with the documented assessment and plan.

## 2021-08-27 NOTE — PROGRESS NOTES
[facility] MEDICINE RESIDENCY PROGRAM  DATE OF VISIT : 2021    Patient : Roxie Parham   Age : 50 y.o.  : 1965   MRN : [id number]     Chief Complaint :   Chief Complaint   Patient presents with   Pilo Quiet Other     work note       HPI : Roxie Parham is 50 y.o. female with past medical history of COPD, GERD who presented to the clinic today for work-related letter. Patient states she was on FMLA 2 years ago when he started work. She is working with same partner for the last 2-year. Patient said her employer is planning to switch partner. So patient is requesting a letter saying patient continue to stay with same partner because of her medical condition. Bilateral supraclavicular fullness: Patient reports supraclavicular fullness for last 3 to 4 years. Never had been work-up before. Patient said swelling is increasing in size and becoming little tender. Present on bilateral supraclavicular fossa. Patient noticed lump on her right leg on popliteal fossa and is moving toward thigh. Denies any fever, chills, weight loss, abdominal pain, diarrhea or shortness of breath. Patient also reports intermittent choking and dysphagia. Reviewed previous chart which showed patient was diagnosed with tonsillar abscess and was referred to ENT. But patient never saw before. Past Medical History :  Past Medical History:   Diagnosis Date    Adhesive capsulitis of right shoulder     s/p rotator cuff injury and repair; follows with Dr. Shannan Lion at [facility].  Alcoholism ([address]     Quit 24 years ago.  Allergic rhinosinusitis     Asthma     Rare use of albuterol.     Bilateral carpal tunnel syndrome 2018    left OR 10-2-19     COPD [facility]ch[facility] obstructive [facility] disease) ([facility])     no oxygen, controlled with inhalers     Hidradenitis suppurativa 2012    Hyperlipidemia     on statin    Intertrigo 2012    Moderate single current episode of major depressive disorder ([facility] [address] 3/26/2018    Obesity     Onychomycosis 4/17/2012    Tobacco abuse      Past Surgical History:   Procedure Laterality Date    ANKLE SURGERY      left     CARPAL TUNNEL RELEASE Left 10/2/2019    LEFT ENDOSCOPIC CARPAL TUNNEL RELEASE, LEFT THUMB TRIGGER RELEASE performed by Leela Roca MD at [facility] COLONOSCOPY  08/02/2018    [address] Poor prep, consider repeat 5-10 years.  FRACTURE SURGERY  1994    Left Tibia    FRACTURE SURGERY      Multiple, different injuries    HERNIA REPAIR  2462    Umbilical hernia    [place] COLONOSCOPY FLX DX W/COLLJ SPEC WHEN PFRMD N/A 8/14/2018    COLONOSCOPY DIAGNOSTIC OR SCREENING performed by Cheli Palacios MD at [address] [place] [address] BX N/A 9/11/2018    DILATATION AND CURETTAGE HYSTEROSCOPY performed by Jed Garcia MD at [address] [place] [address]  10/23/2012    Dr. Jaxon Newsome at [facility].            Medication List :    Current Outpatient Medications   Medication Sig Dispense Refill    EPINEPHrine (EPIPEN 2-CAMILLE) 0.3 MG/0.3ML SOAJ injection Use as directed for allergic reaction 2 each 1    albuterol sulfate HFA (PROVENTIL HFA) 108 (90 Base) MCG/ACT inhaler Inhale 2 puffs into the lungs every 6 hours as needed for Wheezing 1 Inhaler 3    tiotropium (SPIRIVA HANDIHALER) 18 MCG inhalation capsule Inhale 1 capsule into the lungs daily (Patient taking differently: Inhale 18 mcg into the lungs daily Instructed to take am of procedure) 90 capsule 1    loratadine (CLARITIN) 10 MG tablet Take 1 tablet by mouth daily (Patient not taking: Reported on 8/27/2021) 90 tablet 3    famotidine (PEPCID) 20 MG tablet Take 1 tablet by mouth 2 times daily (Patient not taking: Reported on 8/27/2021) 180 tablet 1    ipratropium-albuterol (DUONEB) 0.5-2.5 (3) MG/3ML SOLN nebulizer solution Inhale 3 mLs into the lungs every 6 hours as needed for Shortness of Breath (Patient not taking: Reported on 8/27/2021) 360 mL 1     No current facility-administered medications for this visit. Review of Systems :  Review of Systems   Constitutional: Negative for activity change, fatigue, fever and unexpected weight change. HENT: Positive for trouble swallowing. Negative for congestion. Eyes: Negative for visual disturbance. Respiratory: Negative for cough, shortness of breath and wheezing. Cardiovascular: Negative for chest pain, palpitations and leg swelling. Gastrointestinal: Negative for abdominal pain, blood in stool, diarrhea, nausea and vomiting. Genitourinary: Negative for dysuria. Skin: Negative for color change, pallor and wound. Neurological: Negative for seizures, weakness and headaches. Hematological: Does not bruise/bleed easily. Psychiatric/Behavioral: Negative for agitation. The patient is nervous/anxious. Physical Exam :    Vitals: /63 (Site: Left Upper Arm, Position: Sitting, Cuff Size: Large Adult)   Pulse 87   Temp 98.2 °F (36.8 °C) (Temporal)   Ht 5' 5\" (1.651 m)   Wt 220 lb (99.8 kg)   LMP 09/13/2012   SpO2 96%   BMI 36.61 kg/m²     General Appearance: Well developed, awake, alert, oriented, and not in acute distress  HEENT: Enlarged left tonsil with uvular deviation  Neck: Supraclavicular golf ball cup sized swelling, tender to touch, fixed, not erythematous or warm to touch  Chest wall/Lung: CTAB, respirations unlabored. No ronchi/wheezing/rales   Heart[de-identified] RRR, normal S1 and S2  Abdomen: Soft, nontender, nondistended  Extremities: Extremities normal, atraumatic, no cyanosis, clubbing or edema. Neurologic: Alert&Oriented x3. Motor and Sensation grossly intact. Psychiatric: has a normal mood and affect. Behavior is normal.     Assessment & Plan : Patient seen today for supraclavicular fullness. A letter was given to patient as requested. We discussed about it is not medically necessary or appropriate to say that she must work with the same person for medical region. Subcutaneous mass of supraclavicular area  Lymphadenopathy versus fat pad versus other etiology versus infectious etiology   -We will do ultrasound of soft tissue  -Detailed blood work-up for other etiology  - CBC WITH AUTO DIFFERENTIAL; Future  - COMPREHENSIVE METABOLIC PANEL; Future  - HIV-1 AND HIV-2 ANTIBODIES; Future  - RPR; Future  - SEDIMENTATION RATE; Future  - TSH; Future  - LIPID PANEL; Future  - US BIOPSY SOFT TISSUE NECK/THORAX; Future  - [facility] DO, Otolaryngology, [place]  - C-REACTIVE PROTEIN; Future    Left tonsillar hypertrophy  -Previous CT of neck and ultrasound of prosthesis showed tonsillar abscess  -Never follow-up with ENT despite of referral  -We will make referral to ENT    Refill  - EPINEPHrine (EPIPEN 2-CAMILLE) 0.3 MG/0.3ML SOAJ injection; Use as directed for allergic reaction  Dispense: 2 each;  Refill: 1    Follow up in 6 weeks with the PCP for results      Steve Villalta MD

## 2021-08-30 ENCOUNTER — TELEPHONE (OUTPATIENT)
Dept: FAMILY MEDICINE CLINIC | Age: 56
End: 2021-08-30

## 2021-08-30 ENCOUNTER — ANTI-COAG VISIT (OUTPATIENT)
Dept: FAMILY MEDICINE CLINIC | Age: 56
End: 2021-08-30

## 2021-08-30 DIAGNOSIS — R22.2 SUPRACLAVICULAR FOSSA FULLNESS: Primary | ICD-10-CM

## 2021-08-30 ASSESSMENT — ENCOUNTER SYMPTOMS
COLOR CHANGE: 0
TROUBLE SWALLOWING: 1
SHORTNESS OF BREATH: 0
NAUSEA: 0
ABDOMINAL PAIN: 0
BLOOD IN STOOL: 0
VOMITING: 0
WHEEZING: 0
DIARRHEA: 0
COUGH: 0

## 2021-08-30 NOTE — LETTER
[facility]  [address] [facility] [address]  Phone: [phone]  Fax: [phone]    Felicia Bledsoe DO       August 30, 2021     Patient: Maria Eugenia Olguin   YOB: 1965   Date of Visit: 8/30/2021       To Whom It May Concern:     Sabino Castellano is treated for several diagnoses. Her current working conditions and partner have helped to facilitate her ongoing ability to work. Changes in this routine may disrupt the control of her medical condtions and jeopardize her ability to work. Thank you for your consideration.       Sincerely,        Felicia Bledsoe DO

## 2021-08-30 NOTE — TELEPHONE ENCOUNTER
Please call IR and let them know that there was not intended to be an order for a biopsy. We do need to get the ultrasound imaging performed first.  The ultrasound order has been placed. Please let us know if it is not the most appropriate order selection, and we can amend the order. Regarding the letter, please let her know that I wrote a letter that she may  today. I did the best I could to meet her request.  However, it is not medically necessary or appropriate to say that she must work with the same person for medical reasons, and I cannot be dishonest.  I wrote a letter stating that the current arrangements have facilitated her ability to work, which I believe to be true based on what she has said. Thank you!

## 2021-08-30 NOTE — TELEPHONE ENCOUNTER
Interventional radiology called re the Ultrasound Biopsy soft tissue     They need more current imaging prior to scheduling the biopsy.     And she has not scheduled with ENT as of yet    Please advise if new imaging needs ordered

## 2021-09-03 ENCOUNTER — HOSPITAL ENCOUNTER (OUTPATIENT)
Dept: ULTRASOUND IMAGING | Age: 56
Discharge: HOME | End: 2021-09-05

## 2021-09-03 DIAGNOSIS — R22.2 SUPRACLAVICULAR FOSSA FULLNESS: ICD-10-CM

## 2021-09-03 PROCEDURE — 76999 ECHO EXAMINATION PROCEDURE: CPT

## 2021-09-15 ENCOUNTER — TELEPHONE (OUTPATIENT)
Dept: FAMILY MEDICINE CLINIC | Age: 56
End: 2021-09-15

## 2021-09-15 NOTE — TELEPHONE ENCOUNTER
Central scheduling called and stated the patient was told at the hospital she has to have a Cat scan before they can do the US Biopsy.   Please advise,  Thanks, Emily HERNANDEZ

## 2021-09-16 ENCOUNTER — TELEPHONE (OUTPATIENT)
Dept: FAMILY MEDICINE CLINIC | Age: 56
End: 2021-09-16

## 2021-09-16 NOTE — TELEPHONE ENCOUNTER
Called patient. She is doing well. Denies any fever, chills or weight loss. Endorses  intermittent difficulty swallowing and occasional chocking. I will order swallow study now.      Thank you

## 2021-09-16 NOTE — TELEPHONE ENCOUNTER
Dr Robert Hightower office called re the patient referral that we sent  He states her imaging was normal. The office called the patient and her only complaint is difficulty swallowing at this time.     Their office said if she has a swallow study, they can see her after that

## 2021-09-17 ENCOUNTER — CLINICAL DOCUMENTATION (OUTPATIENT)
Dept: FAMILY MEDICINE CLINIC | Age: 56
End: 2021-09-17

## 2021-09-17 DIAGNOSIS — R13.10 DYSPHAGIA, UNSPECIFIED TYPE: Primary | ICD-10-CM

## 2021-09-17 NOTE — PROGRESS NOTES
Called patient and notified US result which is unremarkable. Discussed about possible deferential . She said it is not bothering her. Denies any weight loss, fever and chills. Patient would like to wait now and keep monitoring size. Will hold  Further eval  With CT scan or biopsy now. Patient reports intermittent dysphagia and occasional chocking spells. Reviewed previous CT neck and US which showed tonsillar abscess. Made a referral to ENT on last visit. Saw note, aggred with swallow study.  Will order swallow study for further eval.     Thank you  Gracia Vidales MD  Family Medicine, PGY3

## 2021-09-30 ENCOUNTER — NURSE ONLY (OUTPATIENT)
Dept: FAMILY MEDICINE CLINIC | Age: 56
End: 2021-09-30

## 2021-09-30 DIAGNOSIS — R22.2 SUBCUTANEOUS MASS OF SUPRACLAVICULAR AREA: ICD-10-CM

## 2021-09-30 PROCEDURE — 36415 COLL VENOUS BLD VENIPUNCTURE: CPT | Performed by: FAMILY MEDICINE

## 2021-10-15 DIAGNOSIS — E78.00 PURE HYPERCHOLESTEROLEMIA: ICD-10-CM

## 2021-10-15 RX ORDER — ATORVASTATIN CALCIUM 10 MG/1
10 TABLET, FILM COATED ORAL DAILY
Qty: 90 TABLET | Refills: 1 | Status: SHIPPED | OUTPATIENT
Start: 2021-10-15

## 2021-11-29 ENCOUNTER — OFFICE VISIT (OUTPATIENT)
Dept: FAMILY MEDICINE CLINIC | Age: 56
End: 2021-11-29

## 2021-11-29 VITALS
HEIGHT: 65 IN | SYSTOLIC BLOOD PRESSURE: 154 MMHG | WEIGHT: 224 LBS | OXYGEN SATURATION: 97 % | TEMPERATURE: 98 F | RESPIRATION RATE: 18 BRPM | HEART RATE: 94 BPM | DIASTOLIC BLOOD PRESSURE: 87 MMHG | BODY MASS INDEX: 37.32 KG/M2

## 2021-11-29 DIAGNOSIS — Z78.0 POST-MENOPAUSAL: ICD-10-CM

## 2021-11-29 DIAGNOSIS — Z87.891 HISTORY OF NICOTINE USE: ICD-10-CM

## 2021-11-29 DIAGNOSIS — B35.1 OM (ONYCHOMYCOSIS): ICD-10-CM

## 2021-11-29 DIAGNOSIS — D58.2 ELEVATED HEMOGLOBIN (HCC): ICD-10-CM

## 2021-11-29 DIAGNOSIS — M79.89 SOFT TISSUE MASS: ICD-10-CM

## 2021-11-29 DIAGNOSIS — R13.10 DYSPHAGIA, UNSPECIFIED TYPE: Primary | ICD-10-CM

## 2021-11-29 DIAGNOSIS — Z00.00 HEALTHCARE MAINTENANCE: ICD-10-CM

## 2021-11-29 LAB
BASOPHILS ABSOLUTE: 0.06 E9/L (ref 0–0.2)
BASOPHILS RELATIVE PERCENT: 0.7 % (ref 0–2)
EOSINOPHILS ABSOLUTE: 0.24 E9/L (ref 0.05–0.5)
EOSINOPHILS RELATIVE PERCENT: 3 % (ref 0–6)
HCT VFR BLD CALC: 45 % (ref 34–48)
HEMOGLOBIN: 15.1 G/DL (ref 11.5–15.5)
IMMATURE GRANULOCYTES #: 0.02 E9/L
IMMATURE GRANULOCYTES %: 0.2 % (ref 0–5)
LYMPHOCYTES ABSOLUTE: 2.19 E9/L (ref 1.5–4)
LYMPHOCYTES RELATIVE PERCENT: 27 % (ref 20–42)
MCH RBC QN AUTO: 31.9 PG (ref 26–35)
MCHC RBC AUTO-ENTMCNC: 33.6 % (ref 32–34.5)
MCV RBC AUTO: 94.9 FL (ref 80–99.9)
MONOCYTES ABSOLUTE: 0.4 E9/L (ref 0.1–0.95)
MONOCYTES RELATIVE PERCENT: 4.9 % (ref 2–12)
NEUTROPHILS ABSOLUTE: 5.19 E9/L (ref 1.8–7.3)
NEUTROPHILS RELATIVE PERCENT: 64.2 % (ref 43–80)
PDW BLD-RTO: 12.4 FL (ref 11.5–15)
PLATELET # BLD: 352 E9/L (ref 130–450)
PMV BLD AUTO: 9.1 FL (ref 7–12)
RBC # BLD: 4.74 E12/L (ref 3.5–5.5)
WBC # BLD: 8.1 E9/L (ref 4.5–11.5)

## 2021-11-29 PROCEDURE — 99213 OFFICE O/P EST LOW 20 MIN: CPT | Performed by: STUDENT IN AN ORGANIZED HEALTH CARE EDUCATION/TRAINING PROGRAM

## 2021-11-29 PROCEDURE — 36415 COLL VENOUS BLD VENIPUNCTURE: CPT | Performed by: FAMILY MEDICINE

## 2021-11-29 PROCEDURE — 99212 OFFICE O/P EST SF 10 MIN: CPT | Performed by: STUDENT IN AN ORGANIZED HEALTH CARE EDUCATION/TRAINING PROGRAM

## 2021-11-29 SDOH — ECONOMIC STABILITY: FOOD INSECURITY: WITHIN THE PAST 12 MONTHS, THE FOOD YOU BOUGHT JUST DIDN'T LAST AND YOU DIDN'T HAVE MONEY TO GET MORE.: NEVER TRUE

## 2021-11-29 SDOH — ECONOMIC STABILITY: FOOD INSECURITY: WITHIN THE PAST 12 MONTHS, YOU WORRIED THAT YOUR FOOD WOULD RUN OUT BEFORE YOU GOT MONEY TO BUY MORE.: NEVER TRUE

## 2021-11-29 ASSESSMENT — SOCIAL DETERMINANTS OF HEALTH (SDOH): HOW HARD IS IT FOR YOU TO PAY FOR THE VERY BASICS LIKE FOOD, HOUSING, MEDICAL CARE, AND HEATING?: NOT HARD AT ALL

## 2021-11-29 NOTE — PROGRESS NOTES
CC: Follow-up of mass/also having dysphagia  ------------------------------------------------------------------------------------------------------------------------  Assessment/Plan   Diagnosis Orders   1. Dysphagia, unspecified type  FL MODIFIED BARIUM [employer] VIDEO    SLP video swallow    Ambulatory referral to General Surgery    CTA NECK W CONTRAST   2. Soft tissue mass  CBC Auto Differential    CTA NECK W CONTRAST   3. Elevated hemoglobin (HCC)  CBC Auto Differential   4. OM (onychomycosis)  External Referral To Podiatry   5. Healthcare maintenance  [facility] OBIE DIGITAL SCREEN BILATERAL    CT Lung Screen (Initial/Annual)   6. Post-menopausal  [facility] OBIE DIGITAL SCREEN BILATERAL   7. History of nicotine use  CT Lung Screen (Initial/Annual)       RTO 1 week for hypertension/foot pain  -------------------------------------------------------------------------------------------------------------------------    HPI:  Leonidas Dover is 50 y.o. female with past medical history of COPD, GERD, prior nicotine use quit 2018 who presents for a follow-up of a mass underneath her right clavicle. Last seen 8/2021, lab work-up, and ultrasound was done. Prior ENT referral suggested patient needs a swallow study she has not completed this. Lab work-up revealed hemoglobin elevated, CRP mildly elevated 0.5. Ultrasound was negative for any pathology      Mass on upper right chest area  More than 1 year of onset. Feels like fullness, discomfort  Feels the size may have increased since her last visit 3 months ago  Painful at times, mostly feels pressure-like  Has concern for esophageal cancer, her father was diagnosed at age 54, asks if this could be related to it  No discharge no swelling no fevers no chills.   No lumps or masses in the groin or axilla area  Denying chest pain shortness of breath chest tightness    Prior lab work-upas stated in first paragraph    Difficulty swallowing  Onset 3 to 4 months  With solids only  Feels like they are stuck in her throat when she swallows  No episodes of choking  Heartburn, almost constantly  No unintentional weight loss, voice hoarseness, abdominal pain, nausea/vomiting/hemoptysis/cough    Risk factor: Nicotine use prior, quit 2018. Acid reflux, family history of esophageal cancer     Patient Active Problem List    Diagnosis Date Noted    Postmenopausal 08/10/2020    Pure hypercholesterolemia 05/08/2019    Gastroesophageal reflux disease 04/15/2019    Thyroid nodule 04/03/2019    Tinnitus of both ears 02/11/2019    Fall down stairs 12/10/2018    PMB (postmenopausal bleeding) 09/06/2018    Shortness of breath     Pulmonary nodules 05/16/2018    Bee sting allergy 05/04/2018    Post-traumatic arthritis of left ankle 04/24/2018    Bilateral carpal tunnel syndrome 03/26/2018    Moderate single current episode of major depressive disorder ([facility] [address] 03/26/2018    Biceps tendonitis of both shoulders 05/12/2016    Right shoulder pain 11/05/2013    Hidradenitis suppurativa 04/17/2012    Intertrigo 04/17/2012    Onychomycosis 04/17/2012    Adhesive capsulitis of right shoulder     Hypertension     Tobacco abuse     COPD [facility]ch[facility] obstructive pulmonary disease[facility] ([facility]     Allergic rhinosinusitis     Rotator cuff (capsule) sprain 07/01/2011       Past Medical History:   Diagnosis Date    Adhesive capsulitis of right shoulder     s/p rotator cuff injury and repair; follows with Dr. Anders Peabody at [facility].  Alcoholism ([address]     Quit 24 years ago.  Allergic rhinosinusitis     Asthma     Rare use of albuterol.     Bilateral carpal tunnel syndrome 03/26/2018    left OR 10-2-19     COPD (chronic obstructive pulmonary disease) (HCC)     no oxygen, controlled with inhalers     Hidradenitis suppurativa 4/17/2012    Hyperlipidemia     on statin    Intertrigo 4/17/2012    Moderate single current episode of major depressive disorder ([address] 3/26/2018    Obesity     Onychomycosis 4/17/2012    Tobacco abuse        Current Outpatient Medications on File Prior to Visit   Medication Sig Dispense Refill    atorvastatin (LIPITOR) 10 MG tablet Take 1 tablet by mouth daily 90 tablet 1    EPINEPHrine (EPIPEN 2-CAMILLE) 0.3 MG/0.3ML SOAJ injection Use as directed for allergic reaction 2 each 1    albuterol sulfate HFA (PROVENTIL HFA) 108 (90 Base) MCG/ACT inhaler Inhale 2 puffs into the lungs every 6 hours as needed for Wheezing 1 Inhaler 3    loratadine (CLARITIN) 10 MG tablet Take 1 tablet by mouth daily (Patient not taking: Reported on 8/27/2021) 90 tablet 3    famotidine (PEPCID) 20 MG tablet Take 1 tablet by mouth 2 times daily (Patient not taking: Reported on 8/27/2021) 180 tablet 1    tiotropium (SPIRIVA HANDIHALER) 18 MCG inhalation capsule Inhale 1 capsule into the lungs daily (Patient taking differently: Inhale 18 mcg into the lungs daily Instructed to take am of procedure) 90 capsule 1    ipratropium-albuterol (DUONEB) 0.5-2.5 (3) MG/3ML SOLN nebulizer solution Inhale 3 mLs into the lungs every 6 hours as needed for Shortness of Breath (Patient not taking: Reported on 8/27/2021) 360 mL 1     No current facility-administered medications on file prior to visit.        Allergies   Allergen Reactions    Bee Venom Anaphylaxis    Pcn [Penicillins] Nausea And Vomiting       Family History   Problem Relation Age of Onset    Cancer Mother 37        CA in shoulder, lungs    High Blood Pressure Mother     Heart Disease Mother     Mental Illness Mother     High Cholesterol Mother     Arthritis Mother     Heart Attack Mother 43    Cancer Father 64        Throat CA    Substance Abuse Father         Alcohol, throat CA    High Blood Pressure Father     High Cholesterol Father     Diabetes Maternal Aunt        Past Surgical History:   Procedure Laterality Date    ANKLE SURGERY      left     CARPAL TUNNEL RELEASE Left 10/2/2019    LEFT ENDOSCOPIC CARPAL TUNNEL RELEASE, LEFT THUMB TRIGGER RELEASE performed by Marta Recinos MD at [address] COLONOSCOPY  08/02/2018    [address] Poor prep, consider repeat 5-10 years.  FRACTURE SURGERY  1994    Left Tibia    FRACTURE SURGERY      Multiple, different injuries    HERNIA REPAIR  0567    Umbilical hernia    DC COLONOSCOPY FLX DX W/COLLJ SPEC WHEN PFRMD N/A 8/14/2018    COLONOSCOPY DIAGNOSTIC OR SCREENING performed by Laura Eduardo MD at [address] [place] [address] BX N/A 9/11/2018    DILATATION AND CURETTAGE HYSTEROSCOPY performed by Ivan Don MD at [facility]  10/23/2012    Dr[facility] Rosie Horne at [facility]. Social History     Tobacco Use    Smoking status: Former Smoker     Packs/day: 1.00     Years: 36.00     Pack years: 36.00     Types: Cigarettes     Quit date: 5/11/2018     Years since quitting: 3.5    Smokeless tobacco: Never Used   Vaping Use    Vaping Use: Never used   Substance Use Topics    Alcohol use: No     Alcohol/week: 0.0 standard drinks     Comment: Previous alcoholism, 24 years clean.  Drug use: No       ROS:    Review of Systems   Constitutional: Negative for activity change, appetite change, chills and fatigue. HENT: Negative for congestion and sore throat. Positive for difficulty swallowing  Respiratory: Negative for choking and chest tightness. Cardiovascular: Negative for chest pain, palpitations and leg swelling. Gastrointestinal: Negative for abdominal distention and abdominal pain. Positive for difficulty swallowing  Genitourinary: Negative for difficulty urinating and dysuria. Musculoskeletal: Mass on upper chest wall. Negative for back pain  Skin: Negative for color change and pallor. Neurological: Negative for facial asymmetry and headaches. Psychiatric/Behavioral: Negative for behavioral problems. The patient is not nervous/anxious.         Objective:    VS:  Blood pressure (!) 154/87, pulse 94, temperature 98 °F (36.7 °C), temperature source Temporal, resp. rate 18, height 5' 5\" (1.651 m), weight 224 lb (101.6 kg), last menstrual period 09/13/2012, SpO2 97 %, not currently breastfeeding. Physical Exam   Constitutional: Oriented to person, place, and time. Well-developed and well-nourished. HENT:   Head: Normocephalic and atraumatic. Eyes: EOM are normal.   Neck: Neck supple. Cardiovascular: Normal rate, regular rhythm and intact distal pulses. Exam reveals no gallop and no friction rub. No murmur heard. Pulmonary/Chest: Effort normal and breath sounds normal. No wheezes. No rhales. Abdominal: Soft. Bowel sounds are normal. No distension. No abdominal tenderness. Musculoskeletal: Normal range of motion. Mobile soft mass inferior to the right clavicle. Some erythema above it. No swelling. Mildly tender to palpation. Extremity: Onychomycosis on bilateral feet. No lesions bruises  Neurological: Alert and oriented to person, place, and time. Skin: Skin is warm and dry. No rash noted. No erythema. Psychiatric: Normal mood and affect. Behavior is normal.   Nursing note and vitals reviewed. Plans:  As above. Please see Patient Instructions for further counseling and information given. Advised to please be adherent to the treatment plans discussed today, and please call with any questions or concerns, letting the office know of any reasons that the plans may not be followed. The risks of untreated conditions include worsening illness, injury, disability, and possibly, death. Please call if symptoms change in any way, worsen, or fail to completely resolve, as this could necessitate a change to treatment plans. Patient and/or caregiver expressed understanding. Indications and proper use of medication(s) reviewed. Potential side-effects and risks of medication(s) also explained. Patient and/or caregiver was instructed to call if any new symptoms develop prior to next visit.     Health risk factors discussed and addressed.

## 2021-11-29 NOTE — PROGRESS NOTES
S: 49 y.o. female presents today for Check-Up    R chest wall mass f/u: started about 1 year ago; soft mass on the R side, at the base of the neck; feels pain at times; feels this is enlarging and she has some pressure sensation; no puss or discharge or new lumps in alternate areas of the body; slight difficulty with swallowing; solid food gets stuck in her throat; father with hx of esophageal cancer; +hx of smoking until 2018; she is concerned for esophageal cancer with enlarged lymph node     O: VS: BP (!) 154/87 (Site: Left Upper Arm, Position: Sitting, Cuff Size: Medium Adult)   Pulse 94   Temp 98 °F (36.7 °C) (Temporal)   Resp 18   Ht 5' 5\" (1.651 m)   Wt 224 lb (101.6 kg)   LMP 09/13/2012   SpO2 97%   BMI 37.28 kg/m²   AAO/NAD, appropriate affect for mood  Neck: large girth; no obvious asymmetry or lymphadenopathy  CV:  RRR, no murmur  Resp: CTAB  Abdomen: sntnd  Msk: soft tissue mass inferior to R clavicle, superficial; mobile mass with regular borders and no surrounding swelling  Ext: no edema    Assessment/Plan:   1) Superior chest wall mass - CT neck w contrast  2) Dysphagia - SLP; swallow eval; gen surg referral  3) Elevated Hg - repeat labs  4) HM - as ordered; CT lung screen  RTO: Return in about 1 week (around 12/6/2021) for Hypertension/foot pain. Attending Physician Statement  I have discussed the case, including pertinent history and exam findings with the resident. I agree with the documented assessment and plan.       Electronically signed by Lucero Oneal MD on 11/29/2021 at 2:16 PM

## 2021-11-30 ENCOUNTER — TELEPHONE (OUTPATIENT)
Dept: SURGERY | Age: 56
End: 2021-11-30

## 2021-11-30 NOTE — TELEPHONE ENCOUNTER
MA contacted patient to schedule appointment based on referral by Dr Ludmila Dominguez for Dysphagia. Patient stated she is busy at work, but to schedule her with the first available and call her back to leave a  and she will call back if that appointment is not okay. Patient had previously seen Dr Kezia Cowan in 10/2019 so MA scheduled patient w/ his first available on 1/3/2022 @ 1:00pm with Dr Kezia Cowan in ' [facility]. MA called back to leave detailed message to inform leaving phone number to call back if appointment does not work. MA will send letter as reminder of appointment and what to bring.     Electronically signed by Josh Zuñiga on 11/30/21 at 8:49 AM EST

## 2021-12-30 ENCOUNTER — OFFICE VISIT (OUTPATIENT)
Dept: FAMILY MEDICINE CLINIC | Age: 56
End: 2021-12-30

## 2021-12-30 VITALS
SYSTOLIC BLOOD PRESSURE: 159 MMHG | RESPIRATION RATE: 18 BRPM | DIASTOLIC BLOOD PRESSURE: 95 MMHG | OXYGEN SATURATION: 96 % | TEMPERATURE: 98.2 F | HEART RATE: 103 BPM

## 2021-12-30 DIAGNOSIS — J01.41 ACUTE RECURRENT PANSINUSITIS: ICD-10-CM

## 2021-12-30 DIAGNOSIS — J01.41 ACUTE RECURRENT PANSINUSITIS: Primary | ICD-10-CM

## 2021-12-30 PROCEDURE — 99212 OFFICE O/P EST SF 10 MIN: CPT | Performed by: FAMILY MEDICINE

## 2021-12-30 PROCEDURE — 99213 OFFICE O/P EST LOW 20 MIN: CPT | Performed by: FAMILY MEDICINE

## 2021-12-30 RX ORDER — DOXYCYCLINE HYCLATE 100 MG
100 TABLET ORAL 2 TIMES DAILY
Qty: 14 TABLET | Refills: 0 | Status: SHIPPED | OUTPATIENT
Start: 2021-12-30 | End: 2022-01-06

## 2021-12-30 NOTE — PROGRESS NOTES
[facility]  FAMILY MEDICINE RESIDENCY PROGRAM   OFFICE PROGRESS NOTE  DATE OF VISIT : 12/30/2021         Chief Complaint :   Chief Complaint   Patient presents with    Sinus Problem     started yesterday no fevere or chills congestion, runny noses, drainage yellow    Hypertension     elevated bp x 2        HPI:   Hi Clarke comes to clinic today for     new or recent complaint of one day of sinus pressure and runny nose     Chronic problems reviewed today include:     n/a    Current status of this/these condition(s):  n/a    Tolerating meds: n/a    Additional history: Patient with one day of runny nose and facial pain. Hx of recurrent sinusitis, COPD, former smoker. Vaccinated X 2     Objective:    Vitals: BP (!) 159/95   Pulse 103   Temp 98.2 °F (36.8 °C) (Temporal)   Resp 18   LMP 09/13/2012   SpO2 96%   General Appearance: Well developed, awake, alert, oriented, and in NAD  HEENT: Normocephalic,atraumatic. Red, swollen nasal mucosa with mucoid rhinorrhea,  Tenderness of cheeks and forehead. Normal TM's bilaterally, no pallor or icterus. Neck: Supple, symmetrical, trachea midline. No JVD. Thyroid smooth, not enlarged. No adenopathy  Chest wall/Lung: Clear to auscultation bilaterally,  respirations unlabored. No rhonchi/wheezing/rales  Heart: Regular rate and rhythm, S1and S2 normal, no murmur, rub or gallop. Skin: Skin color, texture, turgor normal, no rashes or lesions  Psychiatric: angry      1. Acute recurrent pansinusitis  -     doxycycline hyclate (VIBRA-TABS) 100 MG tablet; Take 1 tablet by mouth 2 times daily for 7 days, Disp-14 tablet, R-0Normal      Additional Plan:    COVID testing performed. Patient advised to isolate until results are called. I suggested that she wait to take antibiotic, but she insists that she has had severe, resistant sinusitis in the past and it is a holiday weekend, so I prescribed doxycycline for one week.   She was very upset about recommendation to stay home while testing results were awaited. RTO in prn or sooner for any persistent, new, or worsening symptoms. Please see Patient Instructions for further counseling and information given. Advised to be adherent to the treatment plans discussed today, and please call with any questions or concerns, letting the office know of any reasons that the plans may not be followed. The risks of untreated conditions include worsening illness, injury, disability, and possibly, death. Please call if symptoms change in any way, worsen, or fail to completely resolve, as this could necessitate a change to treatment plans. Patient and/or caregiver expressed understanding. Indications and proper use of medication(s) reviewed. Potential side-effects and risks of medication(s) also explained. Patient and/or caregiver was instructed to call if any new symptoms develop prior to next visit. Health risk factors discussed and addressed.     Signed by : Luis Angel Lewis MD, M.D.

## 2021-12-31 LAB
SARS-COV-2: NOT DETECTED
SOURCE: NORMAL

## 2022-01-03 ENCOUNTER — HOSPITAL ENCOUNTER (OUTPATIENT)
Dept: GENERAL RADIOLOGY | Age: 57
Discharge: HOME | End: 2022-01-05

## 2022-01-03 ENCOUNTER — OFFICE VISIT (OUTPATIENT)
Dept: SURGERY | Age: 57
End: 2022-01-03

## 2022-01-03 VITALS
HEART RATE: 83 BPM | WEIGHT: 220 LBS | HEIGHT: 65 IN | OXYGEN SATURATION: 97 % | SYSTOLIC BLOOD PRESSURE: 139 MMHG | DIASTOLIC BLOOD PRESSURE: 90 MMHG | TEMPERATURE: 98.5 F | BODY MASS INDEX: 36.65 KG/M2

## 2022-01-03 DIAGNOSIS — R13.10 DYSPHAGIA, UNSPECIFIED TYPE: ICD-10-CM

## 2022-01-03 DIAGNOSIS — Z00.00 HEALTHCARE MAINTENANCE: ICD-10-CM

## 2022-01-03 DIAGNOSIS — Z78.0 POST-MENOPAUSAL: ICD-10-CM

## 2022-01-03 PROCEDURE — 99212 OFFICE O/P EST SF 10 MIN: CPT | Performed by: SURGERY

## 2022-01-03 PROCEDURE — 77063 BREAST TOMOSYNTHESIS BI: CPT

## 2022-01-03 ASSESSMENT — ENCOUNTER SYMPTOMS
CONSTIPATION: 0
VOMITING: 0
DIARRHEA: 0
TROUBLE SWALLOWING: 1
BLOOD IN STOOL: 0
ABDOMINAL DISTENTION: 0
ABDOMINAL PAIN: 0
COUGH: 0
RECTAL PAIN: 0
CHOKING: 1
SORE THROAT: 0
SHORTNESS OF BREATH: 0
NAUSEA: 0
ANAL BLEEDING: 0

## 2022-01-03 NOTE — PATIENT INSTRUCTIONS
[facility]   Ph: [phone]    Please contact Rock Osgood MA at [phone] with any questions or concerns.

## 2022-01-03 NOTE — PROGRESS NOTES
GENERAL SURGERY        HISTORY AND PHYSICAL    CHIEF COMPLAINT  Chief Complaint   Patient presents with    Dysphagia     Last seen 10/2019 - states not any worse, just needs checked     Other     patient states physician is concerned that her lymph node is swollen     Other     states has choked on steaks and breads while eating - not as much liquids     Other     states that sometimes if she is choking she can try and relax she can manipulate it in her throat        HPI  She is referred for evaluation of dysphagia. I last saw her in the office on 10/7/2019 for dysphagia. Just prior to that time, she had undergone an ultrasound-guided thyroid biopsy which showed benign cytology. The cytology report noted that the findings were consistent with a colloid nodule/nodular goiter. My recommendation at that time was for observation as her symptoms appear to be improving slowly. Recently, she continues with the intermittent dysphagia. She claims that she gets food stuck in the lower throat region. She claims that she applies a little anterior pressure in the food either passes or she regurgitates it. She denies any bleeding. She had bilateral supraclavicular ultrasound performed on September 3, 2021-this examination was negative for any abnormality. In addition, she admits to tenderness along her clavicle along with swelling superior to her right clavicle. She admits to some swelling bilaterally but more significant on the right. She has a history of multiple clavicular fractures bilaterally. She recently was seen by her primary care provider who ordered several diagnostic evaluations regarding her dysphagia and supraclavicular swelling. These evaluations have not been completed. She admits to a positive family history of esophageal-throat cancer. She is unsure of the details. She is attempting to get a copy of her father's death certificate to try to gain more information.   She quit smoking in 2018.    Past Medical History:   Diagnosis Date    Adhesive capsulitis of right shoulder     s/p rotator cuff injury and repair; follows with Dr. Candi Favre at [facility].  Alcoholism ([facility] [address]     Quit 24 years ago.  Allergic rhinosinusitis     Asthma     Rare use of albuterol.  Bilateral carpal tunnel syndrome 03/26/2018    left OR 10-2-19     COPD (chronic obstructive pulmonary disease) ([facility])     no oxygen, controlled with inhalers     Hidradenitis suppurativa 4/17/2012    Hyperlipidemia     on statin    Intertrigo 4/17/2012    Moderate single current episode of major depressive disorder ([facility] [address] 3/26/2018    Obesity     Onychomycosis 4/17/2012    Tobacco abuse        Past Surgical History:   Procedure Laterality Date    ANKLE SURGERY      left     CARPAL TUNNEL RELEASE Left 10/2/2019    LEFT ENDOSCOPIC CARPAL TUNNEL RELEASE, LEFT THUMB TRIGGER RELEASE performed by Chelsea Parks MD at [facility] COLONOSCOPY  08/02/2018    [address] Poor prep, consider repeat 5-10 years.  FRACTURE SURGERY  1994    Left Tibia    FRACTURE SURGERY      Multiple, different injuries    HERNIA REPAIR  5092    Umbilical hernia    MI COLONOSCOPY FLX DX W/COLLJ SPEC WHEN PFRMD N/A 8/14/2018    COLONOSCOPY DIAGNOSTIC OR SCREENING performed by Jah Mendez MD at [address] [place] [address] BX N/A 9/11/2018    DILATATION AND CURETTAGE HYSTEROSCOPY performed by Rolo Wang MD at [address] [place] [address]  10/23/2012    Dr. Candi Favre at [facility].          Current Outpatient Medications   Medication Sig Dispense Refill    doxycycline hyclate (VIBRA-TABS) 100 MG tablet Take 1 tablet by mouth 2 times daily for 7 days 14 tablet 0    atorvastatin (LIPITOR) 10 MG tablet Take 1 tablet by mouth daily 90 tablet 1    EPINEPHrine (EPIPEN 2-CAMILLE) 0.3 MG/0.3ML SOAJ injection Use as directed for allergic reaction 2 each 1    albuterol sulfate HFA (PROVENTIL HFA) 108 (90 Base) MCG/ACT inhaler Inhale 2 puffs into the lungs every 6 hours as needed for Wheezing 1 Inhaler 3    loratadine (CLARITIN) 10 MG tablet Take 1 tablet by mouth daily 90 tablet 3    famotidine (PEPCID) 20 MG tablet Take 1 tablet by mouth 2 times daily 180 tablet 1    tiotropium (SPIRIVA HANDIHALER) 18 MCG inhalation capsule Inhale 1 capsule into the lungs daily (Patient taking differently: Inhale 18 mcg into the lungs daily Instructed to take am of procedure) 90 capsule 1    ipratropium-albuterol (DUONEB) 0.5-2.5 (3) MG/3ML SOLN nebulizer solution Inhale 3 mLs into the lungs every 6 hours as needed for Shortness of Breath 360 mL 1     No current facility-administered medications for this visit. Allergies   Allergen Reactions    Bee Venom Anaphylaxis    Pcn [Penicillins] Nausea And Vomiting       Family History   Problem Relation Age of Onset   Mata Saliva Cancer Mother 37        CA in shoulder, lungs    High Blood Pressure Mother     Heart Disease Mother     Mental Illness Mother     High Cholesterol Mother     Arthritis Mother     Heart Attack Mother 43    Cancer Father 64        Throat CA    Substance Abuse Father         Alcohol, throat CA    High Blood Pressure Father     High Cholesterol Father     Esophageal Cancer Father     Diabetes Maternal Aunt        Social History     Socioeconomic History    Marital status:      Spouse name: Not on file    Number of children: Not on file    Years of education: Not on file    Highest education level: Not on file   Occupational History    Occupation: OFF WORK -    Tobacco Use    Smoking status: Former Smoker     Packs/day: 1.00     Years: 36.00     Pack years: 36.00     Types: Cigarettes     Quit date: 5/11/2018     Years since quitting: 3.6    Smokeless tobacco: Never Used   Vaping Use    Vaping Use: Never used   Substance and Sexual Activity    Alcohol use: No     Alcohol/week: 0.0 standard drinks     Comment: Previous alcoholism, 24 years clean.  Drug use:  No  Sexual activity: Not Currently     Partners: Male   Other Topics Concern    Not on file   Social History Narrative    Not on file     Social Determinants of Health     Financial Resource Strain: Low Risk     Difficulty of Paying Living Expenses: Not hard at all   Food Insecurity: No Food Insecurity    Worried About Running Out of Food in the Last Year: Never true    [address] in the Last Year: Never true   Transportation Needs:     Lack of Transportation (Medical): Not on file    Lack of Transportation (Non-Medical): Not on file   Physical Activity:     Days of Exercise per Week: Not on file    Minutes of Exercise per Session: Not on file   Stress:     Feeling of Stress : Not on file   Social Connections:     Frequency of Communication with Friends and Family: Not on file    Frequency of Social Gatherings with Friends and Family: Not on file    Attends Synagogue Services: Not on file    Active Member of [address] [place] [address] [employer] or Organizations: Not on file    Attends Club or Organization Meetings: Not on file    Marital Status: Not on file   Intimate Partner Violence:     Fear of Current or Ex-Partner: Not on file    Emotionally Abused: Not on file    Physically Abused: Not on file    Sexually Abused: Not on file   Housing Stability:     Unable to Pay for Housing in the Last Year: Not on file    Number of [place] in the Last Year: Not on file    Unstable Housing in the Last Year: Not on file         ROS  Review of Systems   Constitutional: Negative for activity change, appetite change, chills, diaphoresis, fatigue, fever and unexpected weight change. HENT: Positive for trouble swallowing. Negative for sore throat. Respiratory: Positive for choking. Negative for cough and shortness of breath. Cardiovascular: Negative for chest pain. Gastrointestinal: Negative for abdominal distention, abdominal pain, anal bleeding, blood in stool, constipation, diarrhea, nausea, rectal pain and vomiting. Genitourinary: Negative for difficulty urinating. Musculoskeletal: Negative for arthralgias. Neurological: Negative for seizures, syncope and speech difficulty. Hematological: Negative for adenopathy. Psychiatric/Behavioral: Negative. BP (!) 139/90 (Site: Left Upper Arm, Position: Sitting, Cuff Size: Large Adult)   Pulse 83   Temp 98.5 °F (36.9 °C) (Infrared)   Ht 5' 5\" (1.651 m)   Wt 220 lb (99.8 kg)   LMP 09/13/2012   SpO2 97%   BMI 36.61 kg/m²     PHYSICAL EXAM  Physical Exam  Constitutional:       General: She is not in acute distress. Appearance: Normal appearance. She is not ill-appearing. Eyes:      General: No scleral icterus. Neck:      Thyroid: No thyroid mass or thyromegaly. Trachea: Trachea normal.     Cardiovascular:      Rate and Rhythm: Normal rate and regular rhythm. Pulmonary:      Effort: No respiratory distress. Breath sounds: Normal breath sounds. Chest:       Abdominal:      General: There is no distension. Palpations: Abdomen is soft. Tenderness: There is no abdominal tenderness. Musculoskeletal:         General: No swelling or tenderness. Cervical back: Neck supple. Lymphadenopathy:      Cervical: No cervical adenopathy. Skin:     General: Skin is warm and dry. Coloration: Skin is not jaundiced. Neurological:      General: No focal deficit present. Mental Status: She is alert and oriented to person, place, and time. Psychiatric:         Mood and Affect: Mood normal.         Behavior: Behavior normal.         Thought Content: Thought content normal.         Judgment: Judgment normal.           ASSESSMENT AND PLAN  1. Dysphagia-she has several diagnostic tests ordered but not complete. This includes a CTA of the neck, modified barium swallow, and a CT lung screen. In addition, she had a speech and language pathology evaluation.   I want to speak with her primary care provider team to discuss her diagnostic imaging and to assist in her scheduling. Once these examinations are complete, I will meet with the patient to review the findings. 2.   Colon cancer screening-she had a normal colonoscopy in August 2018. 3.   Pain to palpation over her medial right clavicle-I believe this is musculoskeletal in origin. Minerva Pitts MD    NOTE: This report was transcribed using voice recognition software. Every effort was made to ensure accuracy; however, inadvertent computerized transcription errors may be present.

## 2022-01-04 ENCOUNTER — TELEPHONE (OUTPATIENT)
Dept: FAMILY MEDICINE CLINIC | Age: 57
End: 2022-01-04

## 2022-01-04 DIAGNOSIS — Z09 FOLLOW UP: Primary | ICD-10-CM

## 2022-01-04 NOTE — TELEPHONE ENCOUNTER
Patient needs help scheduling CT Neck/CT Lung    I called her today, Dr. Irais Anderson brought this to my attention at her follow-up with him.     Please assist with calling patient to provide the #s to schedule the imaging    Also patient may need to be scheduled for a follow-up in our clinic, if she is available

## 2022-01-06 NOTE — TELEPHONE ENCOUNTER
Are we able to schedule the CT Neck and CT Lung for the patient?  She only needs 1 week notice to get time off from work    She asked if we offer this    I called to follow-up she has still not been scheduled for the CT    She is busy (working until 5 PM daily)

## 2022-08-03 ENCOUNTER — OFFICE VISIT (OUTPATIENT)
Dept: FAMILY MEDICINE CLINIC | Age: 57
End: 2022-08-03

## 2022-08-03 VITALS
HEART RATE: 83 BPM | BODY MASS INDEX: 37.65 KG/M2 | WEIGHT: 226 LBS | DIASTOLIC BLOOD PRESSURE: 82 MMHG | TEMPERATURE: 98.3 F | OXYGEN SATURATION: 96 % | SYSTOLIC BLOOD PRESSURE: 130 MMHG | RESPIRATION RATE: 18 BRPM | HEIGHT: 65 IN

## 2022-08-03 DIAGNOSIS — J44.1 COPD WITH ACUTE EXACERBATION (HCC): ICD-10-CM

## 2022-08-03 DIAGNOSIS — Z20.822 CLOSE EXPOSURE TO COVID-19 VIRUS: ICD-10-CM

## 2022-08-03 DIAGNOSIS — R05.9 COUGH: ICD-10-CM

## 2022-08-03 DIAGNOSIS — Z20.822 CLOSE EXPOSURE TO COVID-19 VIRUS: Primary | ICD-10-CM

## 2022-08-03 PROCEDURE — 99214 OFFICE O/P EST MOD 30 MIN: CPT

## 2022-08-03 RX ORDER — PREDNISONE 10 MG/1
TABLET ORAL
Qty: 18 TABLET | Refills: 0 | Status: SHIPPED | OUTPATIENT
Start: 2022-08-03 | End: 2022-08-12

## 2022-08-03 NOTE — LETTER
[facility]  [address] [place] [address]  Phone: [phone]  Fax: [phone]    Chelo Taylor        August 3, 2022     Patient: Latasha Echevarria   YOB: 1965   Date of Visit: 8/3/2022       To Whom It May Concern: It is my medical opinion that Uriah Corona may return to work once PCR COVID-19 test results are returned. May take 1-2 days. Results pending at this time. If you have any questions or concerns, please don't hesitate to call.     Sincerely,        KAYLA Taylor

## 2022-08-03 NOTE — PROGRESS NOTES
Chief Complaint       Headache (States she has a headache, can't breathe, tired. Son tested positive for covid. She took home covid test this morning and it was negative. She does have COPD.)    History of Present Illness   Source of history provided by:  patient. Amor Scott is a 49 y.o. old female presenting to the walk in clinic for evaluation of fatigue, headache, mild dry cough x 1 day. Denies any fever, chills, CP, dyspnea, LE edema, abdominal pain, vomiting, rash, or lethargy. Patient has COPD. Patient reports recent sick exposures, son tested positive last week. Patient has been vaccinated for COVID-19. Patient has been taking nothing OTC without symptomatic relief. ROS    Unless otherwise stated in this report or unable to obtain because of the patient's clinical or mental status as evidenced by the medical record, this patients's positive and negative responses for Review of Systems, constitutional, psych, eyes, ENT, cardiovascular, respiratory, gastrointestinal, neurological, genitourinary, musculoskeletal, integument systems and systems related to the presenting problem are either stated in the preceding or were not pertinent or were negative for the symptoms and/or complaints related to the medical problem. Physical Exam         VS:  /82   Pulse 83   Temp 98.3 °F (36.8 °C)   Resp 18   Ht 5' 5\" (1.651 m)   Wt 226 lb (102.5 kg)   LMP 09/13/2012   SpO2 96%   BMI 37.61 kg/m²    Oxygen Saturation Interpretation: Normal.    Constitutional:  Alert, development consistent with age. NAD. Head:  NC/NT. Airway patent. Mouth: Posterior pharynx with mild erythema and clear postnasal drip. No tonsillar hypertrophy or exudate. Neck:  Normal ROM. Supple. No anterior cervical adenopathy noted. Lungs: CTAB without wheezes, rales, or rhonchi. CV:  Regular rate and rhythm, normal heart sounds, without pathological murmurs, ectopy, gallops, or rubs. Skin:  Normal turgor.   Warm, dry, without visible rash. Lymphatic: No lymphangitis or adenopathy noted. Neurological:  Oriented. Motor functions intact. Lab / Imaging Results   (All laboratory and radiology results have been personally reviewed by myself)  Labs:  No results found for this visit on 08/03/22. Imaging: All Radiology results interpreted by Radiologist unless otherwise noted. Assessment / Plan     Impression(s):  Ayde Tan was seen today for headache. Diagnoses and all orders for this visit:    Close exposure to COVID-19 virus  -     COVID-19 Ambulatory; Future    COPD with acute exacerbation (HCC)  -     predniSONE (DELTASONE) 10 MG tablet; Take 3 tablets by mouth daily for 3 days, THEN 2 tablets daily for 3 days, THEN 1 tablet daily for 3 days. Cough  -     predniSONE (DELTASONE) 10 MG tablet; Take 3 tablets by mouth daily for 3 days, THEN 2 tablets daily for 3 days, THEN 1 tablet daily for 3 days. -     COVID-19 Ambulatory; Future    Disposition:  Disposition: Discharge to home. Pt needs negative PCR to return to work. Test ordered, will call with results once available. Pt should also be fever free for 24 hours and symptoms should be improved overall prior to returning. Increase fluids and rest. Symptomatic relief discussed including Tylenol prn pain/fever. Schedule f/u with PCP in 7-10 days if symptoms persist. ED sooner if symptoms worsen or change. ED immediately with high or refractory fever, progressive SOB, dyspnea, CP, calf pain/swelling, shaking chills, vomiting, abdominal pain, lethargy, flank pain, or decreased urinary output. Pt verbalizes understanding and is in agreement with plan of care. All questions answered. KAYLA Restrepo    **This report was transcribed using voice recognition software. Every effort was made to ensure accuracy; however, inadvertent computerized transcription errors may be present.

## 2022-08-04 LAB — SARS-COV-2, PCR: NOT DETECTED

## 2022-11-21 ENCOUNTER — OFFICE VISIT (OUTPATIENT)
Dept: FAMILY MEDICINE CLINIC | Age: 57
End: 2022-11-21

## 2022-11-21 VITALS
BODY MASS INDEX: 36.44 KG/M2 | SYSTOLIC BLOOD PRESSURE: 152 MMHG | DIASTOLIC BLOOD PRESSURE: 96 MMHG | OXYGEN SATURATION: 95 % | TEMPERATURE: 98.4 F | WEIGHT: 219 LBS | HEART RATE: 88 BPM

## 2022-11-21 DIAGNOSIS — E78.00 PURE HYPERCHOLESTEROLEMIA: ICD-10-CM

## 2022-11-21 DIAGNOSIS — I10 PRIMARY HYPERTENSION: ICD-10-CM

## 2022-11-21 DIAGNOSIS — E78.00 PURE HYPERCHOLESTEROLEMIA: Primary | ICD-10-CM

## 2022-11-21 DIAGNOSIS — Z11.59 ENCOUNTER FOR HEPATITIS C SCREENING TEST FOR LOW RISK PATIENT: ICD-10-CM

## 2022-11-21 DIAGNOSIS — J44.9 CHRONIC OBSTRUCTIVE PULMONARY DISEASE, UNSPECIFIED COPD TYPE (HCC): ICD-10-CM

## 2022-11-21 DIAGNOSIS — Z87.81 HISTORY OF FRACTURE OF LEFT ANKLE: ICD-10-CM

## 2022-11-21 LAB
CHOLESTEROL, TOTAL: 249 MG/DL (ref 0–199)
HBA1C MFR BLD: 6 % (ref 4–5.6)
HDLC SERPL-MCNC: 39 MG/DL
LDL CHOLESTEROL CALCULATED: 179 MG/DL (ref 0–99)
TRIGL SERPL-MCNC: 157 MG/DL (ref 0–149)
VLDLC SERPL CALC-MCNC: 31 MG/DL

## 2022-11-21 PROCEDURE — 3078F DIAST BP <80 MM HG: CPT | Performed by: FAMILY MEDICINE

## 2022-11-21 PROCEDURE — 3074F SYST BP LT 130 MM HG: CPT | Performed by: FAMILY MEDICINE

## 2022-11-21 PROCEDURE — 99214 OFFICE O/P EST MOD 30 MIN: CPT | Performed by: FAMILY MEDICINE

## 2022-11-21 ASSESSMENT — PATIENT HEALTH QUESTIONNAIRE - PHQ9
4. FEELING TIRED OR HAVING LITTLE ENERGY: 3
1. LITTLE INTEREST OR PLEASURE IN DOING THINGS: 3
3. TROUBLE FALLING OR STAYING ASLEEP: 3
9. THOUGHTS THAT YOU WOULD BE BETTER OFF DEAD, OR OF HURTING YOURSELF: 0
SUM OF ALL RESPONSES TO PHQ QUESTIONS 1-9: 16
7. TROUBLE CONCENTRATING ON THINGS, SUCH AS READING THE NEWSPAPER OR WATCHING TELEVISION: 3
5. POOR APPETITE OR OVEREATING: 0
SUM OF ALL RESPONSES TO PHQ9 QUESTIONS 1 & 2: 6
8. MOVING OR SPEAKING SO SLOWLY THAT OTHER PEOPLE COULD HAVE NOTICED. OR THE OPPOSITE, BEING SO FIGETY OR RESTLESS THAT YOU HAVE BEEN MOVING AROUND A LOT MORE THAN USUAL: 0
10. IF YOU CHECKED OFF ANY PROBLEMS, HOW DIFFICULT HAVE THESE PROBLEMS MADE IT FOR YOU TO DO YOUR WORK, TAKE CARE OF THINGS AT HOME, OR GET ALONG WITH OTHER PEOPLE: 0
SUM OF ALL RESPONSES TO PHQ QUESTIONS 1-9: 16
6. FEELING BAD ABOUT YOURSELF - OR THAT YOU ARE A FAILURE OR HAVE LET YOURSELF OR YOUR FAMILY DOWN: 1
2. FEELING DOWN, DEPRESSED OR HOPELESS: 3
SUM OF ALL RESPONSES TO PHQ QUESTIONS 1-9: 16
SUM OF ALL RESPONSES TO PHQ QUESTIONS 1-9: 16

## 2022-11-21 NOTE — PROGRESS NOTES
Attending Physician Statement    S:   Chief Complaint   Patient presents with    Follow-up    Other     ADA- F/U. Patient is a 56year old female with history of COPD , asthma , hld, HTN here for follow up of above as well as to have paperwork filled out. HTN- bp has been high in the last month. She is on vacation and notices that being on vacation has helped her bp. Works as a . Has work stress that she thinks contributes to her high bp. Has history of tibial fracture in the 90s . She has pain in her leg from this. Pain is worsening due to extended periods of standing. She is looking for a new job. She has increased swelling in that leg when she gets home from work. She takes ibuprofen and tylenol but has been trying to decrease the number of pills she takes. She now only takes 1 aleve     COPD/Asthma-improves in the winter. She takes spiriva once a day. Uses albuterol infrequently. Quit smoking 5 years ago. Overall breathing is much improved now that it is not hot. Hld - on lipitor but does not take this daily. O: Blood pressure (!) 152/96, pulse 88, temperature 98.4 °F (36.9 °C), temperature source Temporal, weight 219 lb (99.3 kg), last menstrual period 09/13/2012, SpO2 95 %, not currently breastfeeding. Exam:   Heart - RRR   Lungs - clear     A: Htn, hld, copd/asthma, leg pain   P:  Htn - uncontrolled - bp check in 2 weeks. Hld - continue lipitor    Check labs    Fill out paperwork    Continue spiriva    Follow up in 2 weeks for positive PHQ9 . No SI       Attending Attestation   I have discussed the case, including pertinent history and exam findings with the resident. I agree with the documented assessment and plan.

## 2022-11-21 NOTE — PROGRESS NOTES
[address]   Department of Family Medicine  Family Medicine Residency Program      Patient:  Madi Gomez 56 y.o. female          Chief complaint:   Chief Complaint   Patient presents with    Follow-up    Other     ADA- F/U. History of Present Illness   The patient is a 56 y.o. female with a PMH of HTN, GERD, thyroid nodules, and COPD who presents to the clinic for the following medical concerns:    Chronic L ankle: Hx of fractures. Usually works 7-3:30 and does well. Needs spare spike on R foot. Uses aircast on L if needed. Asthma: Better in winter. Spiriva. Denies SOB or wheezing right now. Does have nebulizer but not requiring. On Claritin. HLD: Lipitor 10mg. Sometimes forgets. HTN: running high at home. In 160s. Doesn't use much salt. No HA, CP, or swelling. Is losing weight intentionally. Health maintenance:  Flu and prevnar today    Past Medical History:      Diagnosis Date    Adhesive capsulitis of right shoulder     s/p rotator cuff injury and repair; follows with Dr. Cipriano Gutierrez at [facility]. Alcoholism ([facility] [address]     Quit 24 years ago. Allergic rhinosinusitis     Asthma     Rare use of albuterol. Bilateral carpal tunnel syndrome 03/26/2018    left OR 10-2-19     COPD (chronic obstructive pulmonary disease) ([facility])     no oxygen, controlled with inhalers     Hidradenitis suppurativa 4/17/2012    Hyperlipidemia     on statin    Intertrigo 4/17/2012    Moderate single current episode of major depressive disorder ([facility] [address] 3/26/2018    Obesity     Onychomycosis 4/17/2012    Tobacco abuse        Past Surgical History:        Procedure Laterality Date    ANKLE SURGERY      left     CARPAL TUNNEL RELEASE Left 10/2/2019    LEFT ENDOSCOPIC CARPAL TUNNEL RELEASE, LEFT THUMB TRIGGER RELEASE performed by Tara Ricks MD at [facility]  08/02/2018    [address]. Poor prep, consider repeat 5-10 years.       FRACTURE SURGERY  1994    Left Tibia    FRACTURE SURGERY      Multiple, different injuries    HERNIA REPAIR  1876    Umbilical hernia    [place] COLONOSCOPY FLX DX W/COLLJ SPEC WHEN PFRMD N/A 2018    COLONOSCOPY DIAGNOSTIC OR SCREENING performed by Kathleen Brennan MD at [facility] N/A 2018    DILATATION AND CURETTAGE HYSTEROSCOPY performed by Eileen Nguyễn MD at [address] [place] [address]  10/23/2012    Dr. Christian Meier at [facility]. Allergies:    Bee venom and Pcn [penicillins]    Social History:   Social History     Tobacco Use    Smoking status: Former     Packs/day: 1.00     Years: 36.00     Pack years: 36.00     Types: Cigarettes     Quit date: 2018     Years since quittin.5    Smokeless tobacco: Never   Substance Use Topics    Alcohol use: No     Alcohol/week: 0.0 standard drinks     Comment: Previous alcoholism, 24 years clean. Family History:       Problem Relation Age of Onset    Cancer Mother 37        CA in shoulder, lungs    High Blood Pressure Mother     Heart Disease Mother     Mental Illness Mother     High Cholesterol Mother     Arthritis Mother     Heart Attack Mother 43    Cancer Father 64        Throat CA    Substance Abuse Father         Alcohol, throat CA    High Blood Pressure Father     High Cholesterol Father     Esophageal Cancer Father     Diabetes Maternal Aunt        Reviewof Systems:   Review of Systems Negative unless otherwise stated in HPI. Physical Exam   Vitals: BP (!) 152/96   Pulse 88   Temp 98.4 °F (36.9 °C) (Temporal)   Wt 219 lb (99.3 kg)   LMP 2012   SpO2 95%   BMI 36.44 kg/m²        Physical Exam  Constitutional:       Appearance: Normal appearance. Eyes:      Extraocular Movements: Extraocular movements intact. Conjunctiva/sclera: Conjunctivae normal.   Cardiovascular:      Rate and Rhythm: Normal rate and regular rhythm. Heart sounds: No murmur heard. No friction rub. No gallop.    Pulmonary:      Effort: Pulmonary effort is normal.      Breath sounds: Normal breath sounds. Abdominal:      General: Abdomen is flat. Palpations: Abdomen is soft. Musculoskeletal:         General: No swelling or tenderness. Cervical back: Normal range of motion and neck supple. Right lower leg: No edema. Left lower leg: No edema. Skin:     General: Skin is warm and dry. Neurological:      Mental Status: She is alert and oriented to person, place, and time. Mental status is at baseline. Psychiatric:         Mood and Affect: Mood normal.         Thought Content: Thought content normal.        Assessment and Plan       1. Pure hypercholesterolemia  - LIPID PANEL; Future  - Hemoglobin A1C; Future    2. Encounter for hepatitis C screening test for low risk patient  - Hepatitis C Antibody; Future    3. History of fracture of left ankle  Paperwork to be filled out for work restrictions    4. Primary hypertension  Not well controlled in office  Had been running in normal range at last visit and sometimes at home  Bring back in coming weeks and consider medication  Advised low-salt diet  Advised smoking cessation     5. Chronic obstructive pulmonary disease, unspecified COPD type (HCC)  stable  Continue current inhalers  - Pneumococcal, PCV20, PREVNAR 20, (age 25 yrs+), IM, PF  - Influenza, AFLURIA, (age 1 y+), IM, Preservative Free, 0.5 mL         Please see Patient Instructions for further counseling and information given. Advised to please be adherent to the treatment plans discussed today, and please call with any questions or concerns, letting the office know of any reasons that the plans may not be followed. The risks of untreated conditions include worsening illness, injury, disability, and possibly, death. Please call if symptoms change in any way, worsen, or fail to completely resolve, as this could necessitate a change to treatment plans. Patient and/or caregiver expressed understanding. Indications and proper use of medication(s) reviewed. Potential side-effects and risks of medication(s) also explained. Patient and/or caregiver was instructed to call if any new symptoms develop prior to next visit. Health risk factors discussed and addressed. Return to Office: Return in about 2 weeks (around 12/5/2022) for depression. Medication List:    Current Outpatient Medications   Medication Sig Dispense Refill    atorvastatin (LIPITOR) 10 MG tablet Take 1 tablet by mouth daily 90 tablet 1    EPINEPHrine (EPIPEN 2-CAMILLE) 0.3 MG/0.3ML SOAJ injection Use as directed for allergic reaction 2 each 1    albuterol sulfate HFA (PROVENTIL HFA) 108 (90 Base) MCG/ACT inhaler Inhale 2 puffs into the lungs every 6 hours as needed for Wheezing 1 Inhaler 3    loratadine (CLARITIN) 10 MG tablet Take 1 tablet by mouth daily 90 tablet 3    famotidine (PEPCID) 20 MG tablet Take 1 tablet by mouth 2 times daily 180 tablet 1    tiotropium (SPIRIVA HANDIHALER) 18 MCG inhalation capsule Inhale 1 capsule into the lungs daily (Patient taking differently: Inhale 18 mcg into the lungs daily Instructed to take am of procedure) 90 capsule 1    ipratropium-albuterol (DUONEB) 0.5-2.5 (3) MG/3ML SOLN nebulizer solution Inhale 3 mLs into the lungs every 6 hours as needed for Shortness of Breath 360 mL 1     No current facility-administered medications for this visit.         Maya Guerra MD     Electronically signed by Maya Guerra MD on 11/23/2022 at 7:51 AM

## 2022-11-22 LAB — HEPATITIS C ANTIBODY INTERPRETATION: NORMAL

## 2022-11-23 ENCOUNTER — TELEPHONE (OUTPATIENT)
Dept: FAMILY MEDICINE CLINIC | Age: 57
End: 2022-11-23

## 2022-11-23 NOTE — TELEPHONE ENCOUNTER
Lab results with patient. Patient has not been taking her 10 mg atorvastatin. We will start taking daily and we can reassess in the future. Patient advised of her diagnosis of prediabetes. Plans to make diet and lifestyle changes and will follow up next visit.     Electronically signed by Ruben Riggins MD on 11/23/22 at 11:34 AM EST

## 2022-12-07 ENCOUNTER — TELEMEDICINE (OUTPATIENT)
Dept: FAMILY MEDICINE CLINIC | Age: 57
End: 2022-12-07

## 2022-12-07 DIAGNOSIS — F32.4 MAJOR DEPRESSIVE DISORDER WITH SINGLE EPISODE, IN PARTIAL REMISSION (HCC): Primary | ICD-10-CM

## 2022-12-07 DIAGNOSIS — E78.00 PURE HYPERCHOLESTEROLEMIA: ICD-10-CM

## 2022-12-07 DIAGNOSIS — I10 PRIMARY HYPERTENSION: ICD-10-CM

## 2022-12-07 DIAGNOSIS — Z72.0 TOBACCO ABUSE: ICD-10-CM

## 2022-12-07 PROCEDURE — 99213 OFFICE O/P EST LOW 20 MIN: CPT | Performed by: FAMILY MEDICINE

## 2022-12-07 RX ORDER — AMLODIPINE BESYLATE 5 MG/1
5 TABLET ORAL DAILY
Qty: 30 TABLET | Refills: 3 | Status: SHIPPED | OUTPATIENT
Start: 2022-12-07

## 2022-12-07 RX ORDER — SERTRALINE HYDROCHLORIDE 25 MG/1
25 TABLET, FILM COATED ORAL DAILY
Qty: 30 TABLET | Refills: 3 | Status: SHIPPED | OUTPATIENT
Start: 2022-12-07

## 2022-12-07 RX ORDER — ATORVASTATIN CALCIUM 10 MG/1
10 TABLET, FILM COATED ORAL DAILY
Qty: 90 TABLET | Refills: 3 | Status: SHIPPED | OUTPATIENT
Start: 2022-12-07

## 2022-12-07 NOTE — PROGRESS NOTES
[address]   Department of Family Medicine  Family Medicine Residency Program  E visit      Patient:  Asher Case 56 y.o. female          Chief complaint:   Depression follow up        History of Present Illness   The patient is a 56 y.o. female with a PMH of HTN, GERD, thyroid nodules, and COPD who presents to the clinic for the following medical concerns:    Chronic L ankle pain: Hx of fractures. Usually works 7-3:30 and does well. Needs spare spike on R foot. Uses aircast on L if needed. Paper work filled out from last visit. Asthma: Better in winter. Spiriva. Denies SOB or wheezing right now. Does have nebulizer but not requiring. On Claritin. HLD: Lipitor 10mg. Sometimes forgets. Lipid panel done and not at goal.  HTN: running high at home. In 160s. Doesn't use much salt. No HA, CP, or swelling. Is losing weight intentionally. Depression: Poor sleep. No energy. Feels down.  has sleep study [facility] no sleep apnea  PHQ Scores 11/21/2022 2/5/2020 1/22/2020 2/11/2019 9/20/2018 3/26/2018   PHQ2 Score 6 6 6 6 6 6   PHQ9 Score 16 22 24 25 24 15     Interpretation of Total Score Depression Severity: 1-4 = Minimal depression, 5-9 = Mild depression, 10-14 = Moderate depression, 15-19 = Moderately severe depression, 20-27 = Severe depression     Health maintenance:  CT lung screen    Past Medical History:      Diagnosis Date    Adhesive capsulitis of right shoulder     s/p rotator cuff injury and repair; follows with Dr. Mignon Wooten at [facility]. Alcoholism ([facility] [address]     Quit 24 years ago. Allergic rhinosinusitis     Asthma     Rare use of albuterol.     Bilateral carpal tunnel syndrome 03/26/2018    left OR 10-2-19     COPD (chronic obstructive pulmonary disease) (HCC)     no oxygen, controlled with inhalers     Hidradenitis suppurativa 4/17/2012    Hyperlipidemia     on statin    Intertrigo 4/17/2012    Moderate single current episode of major depressive disorder ([address] 3/26/2018    Obesity Onychomycosis 2012    Tobacco abuse        Past Surgical History:        Procedure Laterality Date    ANKLE SURGERY      left     CARPAL TUNNEL RELEASE Left 10/2/2019    LEFT ENDOSCOPIC CARPAL TUNNEL RELEASE, LEFT THUMB TRIGGER RELEASE performed by Roberto Mayo MD at [address] [place] [address]  2018    [address]. Poor prep, consider repeat 5-10 years. FRACTURE SURGERY      Left Tibia    FRACTURE SURGERY      Multiple, different injuries    HERNIA REPAIR  2349    Umbilical hernia    [place] COLONOSCOPY FLX DX W/COLLJ SPEC WHEN PFRMD N/A 2018    COLONOSCOPY DIAGNOSTIC OR SCREENING performed by Anita Flores MD at [facility] N/A 2018    DILATATION AND CURETTAGE HYSTEROSCOPY performed by Luke Palencia MD at [address] [place] [address]  10/23/2012    Dr. Valery Meredith at [facility]. Allergies:    Bee venom and Pcn [penicillins]    Social History:   Social History     Tobacco Use    Smoking status: Former     Packs/day: 1.00     Years: 36.00     Pack years: 36.00     Types: Cigarettes     Quit date: 2018     Years since quittin.5    Smokeless tobacco: Never   Substance Use Topics    Alcohol use: No     Alcohol/week: 0.0 standard drinks     Comment: Previous alcoholism, 24 years clean. Family History:       Problem Relation Age of Onset    Cancer Mother 37        CA in shoulder, lungs    High Blood Pressure Mother     Heart Disease Mother     Mental Illness Mother     High Cholesterol Mother     Arthritis Mother     Heart Attack Mother 43    Cancer Father 64        Throat CA    Substance Abuse Father         Alcohol, throat CA    High Blood Pressure Father     High Cholesterol Father     Esophageal Cancer Father     Diabetes Maternal Aunt        Reviewof Systems:   Review of Systems Negative unless otherwise stated in HPI.     Physical Exam   Unable to assess as virtual visit     Assessment and Plan   Major Depressive Disorder  No SI  Bring in for visit with Dr. Marifer Marcano co treatment  Start zoloft 25mg daily    Prediabetes  A1c 6.0 11/22  Diet and lifestyle changes recommended    Pure hypercholesterolemia  Lipids still high with   ASCVD 6.5%  Continue atorvastatin 10mg  Diet and lifestyle change    History of fracture of left ankle  Paperwork completed    Primary hypertension  Not well controlled in office  Still high at home  Advised low-salt diet  Advised smoking cessation   Start amlodipine 5mg    Chronic obstructive pulmonary disease, unspecified COPD type ([address]  stable  Continue current inhalers          Please see Patient Instructions for further counseling and information given. Advised to please be adherent to the treatment plans discussed today, and please call with any questions or concerns, letting the office know of any reasons that the plans may not be followed. The risks of untreated conditions include worsening illness, injury, disability, and possibly, death. Please call if symptoms change in any way, worsen, or fail to completely resolve, as this could necessitate a change to treatment plans. Patient and/or caregiver expressed understanding. Indications and proper use of medication(s) reviewed. Potential side-effects and risks of medication(s) also explained. Patient and/or caregiver was instructed to call if any new symptoms develop prior to next visit. Health risk factors discussed and addressed.      Return to Office: Has appt in 1 month      Medication List:    Current Outpatient Medications   Medication Sig Dispense Refill    atorvastatin (LIPITOR) 10 MG tablet Take 1 tablet by mouth daily 90 tablet 1    EPINEPHrine (EPIPEN 2-CAMILLE) 0.3 MG/0.3ML SOAJ injection Use as directed for allergic reaction 2 each 1    albuterol sulfate HFA (PROVENTIL HFA) 108 (90 Base) MCG/ACT inhaler Inhale 2 puffs into the lungs every 6 hours as needed for Wheezing 1 Inhaler 3    loratadine (CLARITIN) 10 MG tablet Take 1 tablet by mouth daily 90 tablet 3    famotidine (PEPCID) 20 MG tablet Take 1 tablet by mouth 2 times daily 180 tablet 1    tiotropium (SPIRIVA HANDIHALER) 18 MCG inhalation capsule Inhale 1 capsule into the lungs daily (Patient taking differently: Inhale 18 mcg into the lungs daily Instructed to take am of procedure) 90 capsule 1    ipratropium-albuterol (DUONEB) 0.5-2.5 (3) MG/3ML SOLN nebulizer solution Inhale 3 mLs into the lungs every 6 hours as needed for Shortness of Breath 360 mL 1     No current facility-administered medications for this visit.         Diane Arriola MD     Electronically signed by Diane Arriola MD on 12/7/2022 at 1:36 PM

## 2022-12-07 NOTE — PROGRESS NOTES
[facility] Video Visit Precepting Note      This Telehealth visit was performed as two-way, audio-video technology platform. Verbal consent was taken from patient as noted in resident's chart. Subjective:  56 y.o. female with depression. No time for counseling. HTN. Not controlled at home. Objective:    General appearance: nl      Additional Data as available:     Impression: depression. HTN  Plan:   Zoloft  Amlodipine. LDCT    Attending Physician Statement  I have reviewed the chart, including available radiology or labs, with the resident. I have discussed the case with the resident, including pertinent history and exam findings. I agree with the assessment, plans, and orders as documented by the resident. Please refer to the resident note for additional information.     Electronically signed by Mamta To MD on 12/7/22 at 3:10 PM EST

## 2022-12-12 NOTE — TELEPHONE ENCOUNTER
Accessed site: right femoral vein. Femoral access needle used. Patient is requesting a letter for work stating that she can only work Monday through [address] as she needs the weekends to recuperate from her COPD and carpal tunnel . She request that the letter be sent via her My Chart account so that she can print it and give it to her employer. Please advise.

## 2022-12-15 ENCOUNTER — TELEPHONE (OUTPATIENT)
Dept: FAMILY MEDICINE CLINIC | Age: 57
End: 2022-12-15

## 2022-12-15 NOTE — TELEPHONE ENCOUNTER
----- Message from Epifanio Hunt sent at 12/15/2022 10:22 AM EST -----  Subject: Message to Provider    QUESTIONS  Information for Provider? Patient has 1/16 appt , if there are any morning   cancellations can she be notified ?  ---------------------------------------------------------------------------  --------------  [id number] [employer]  [phone]; OK to leave message on voicemail  ---------------------------------------------------------------------------  --------------  SCRIPT ANSWERS  Relationship to Patient?  Self

## 2022-12-15 NOTE — TELEPHONE ENCOUNTER
Dr. Ron Carey does not have morning office hours on 1/16, and I do not, either. Patient may be transitioned to another physician on Monday, 1/16 if she prefers a morning appointment. Thank you!

## 2022-12-22 ENCOUNTER — TELEPHONE (OUTPATIENT)
Dept: FAMILY MEDICINE CLINIC | Age: 57
End: 2022-12-22

## 2022-12-22 NOTE — TELEPHONE ENCOUNTER
I tried to contact patient to let her know her paperwork for work accomodation is done and faxed.   The copy is in the chart and the original is in drawer for patient

## 2023-01-13 ENCOUNTER — TELEPHONE (OUTPATIENT)
Dept: CASE MANAGEMENT | Age: 58
End: 2023-01-13

## 2023-01-13 NOTE — TELEPHONE ENCOUNTER
I called the patient and she confirmed her CT lung screening at [facility] on 1/16/2023 at 8:00 am.  I reminded the patient to arrive at 7:30 am, enter through the main entrance, and register. Patient confirmed.             Electronically signed by Brianna Heath on 1/13/23 at 1:05 PM EST

## 2023-01-16 ENCOUNTER — OFFICE VISIT (OUTPATIENT)
Dept: FAMILY MEDICINE CLINIC | Age: 58
End: 2023-01-16

## 2023-01-16 ENCOUNTER — HOSPITAL ENCOUNTER (OUTPATIENT)
Dept: CT IMAGING | Age: 58
Discharge: HOME | End: 2023-01-18

## 2023-01-16 VITALS
WEIGHT: 213 LBS | SYSTOLIC BLOOD PRESSURE: 124 MMHG | HEART RATE: 79 BPM | BODY MASS INDEX: 35.45 KG/M2 | OXYGEN SATURATION: 94 % | DIASTOLIC BLOOD PRESSURE: 79 MMHG | TEMPERATURE: 98.6 F

## 2023-01-16 DIAGNOSIS — J44.9 CHRONIC OBSTRUCTIVE PULMONARY DISEASE, UNSPECIFIED COPD TYPE (HCC): ICD-10-CM

## 2023-01-16 DIAGNOSIS — I10 PRIMARY HYPERTENSION: ICD-10-CM

## 2023-01-16 DIAGNOSIS — Z72.0 TOBACCO ABUSE: ICD-10-CM

## 2023-01-16 DIAGNOSIS — H93.11 TINNITUS OF RIGHT EAR: ICD-10-CM

## 2023-01-16 DIAGNOSIS — F32.4 MAJOR DEPRESSIVE DISORDER WITH SINGLE EPISODE, IN PARTIAL REMISSION (HCC): Primary | ICD-10-CM

## 2023-01-16 PROCEDURE — 3078F DIAST BP <80 MM HG: CPT | Performed by: FAMILY MEDICINE

## 2023-01-16 PROCEDURE — 99212 OFFICE O/P EST SF 10 MIN: CPT | Performed by: FAMILY MEDICINE

## 2023-01-16 PROCEDURE — 71271 CT THORAX LUNG CANCER SCR C-: CPT

## 2023-01-16 PROCEDURE — 3074F SYST BP LT 130 MM HG: CPT | Performed by: FAMILY MEDICINE

## 2023-01-16 PROCEDURE — 99213 OFFICE O/P EST LOW 20 MIN: CPT | Performed by: FAMILY MEDICINE

## 2023-01-16 SDOH — ECONOMIC STABILITY: FOOD INSECURITY: WITHIN THE PAST 12 MONTHS, YOU WORRIED THAT YOUR FOOD WOULD RUN OUT BEFORE YOU GOT MONEY TO BUY MORE.: NEVER TRUE

## 2023-01-16 SDOH — ECONOMIC STABILITY: FOOD INSECURITY: WITHIN THE PAST 12 MONTHS, THE FOOD YOU BOUGHT JUST DIDN'T LAST AND YOU DIDN'T HAVE MONEY TO GET MORE.: NEVER TRUE

## 2023-01-16 ASSESSMENT — PATIENT HEALTH QUESTIONNAIRE - PHQ9
8. MOVING OR SPEAKING SO SLOWLY THAT OTHER PEOPLE COULD HAVE NOTICED. OR THE OPPOSITE, BEING SO FIGETY OR RESTLESS THAT YOU HAVE BEEN MOVING AROUND A LOT MORE THAN USUAL: 0
SUM OF ALL RESPONSES TO PHQ QUESTIONS 1-9: 12
7. TROUBLE CONCENTRATING ON THINGS, SUCH AS READING THE NEWSPAPER OR WATCHING TELEVISION: 2
1. LITTLE INTEREST OR PLEASURE IN DOING THINGS: 2
2. FEELING DOWN, DEPRESSED OR HOPELESS: 2
3. TROUBLE FALLING OR STAYING ASLEEP: 3
SUM OF ALL RESPONSES TO PHQ QUESTIONS 1-9: 12
SUM OF ALL RESPONSES TO PHQ QUESTIONS 1-9: 12
9. THOUGHTS THAT YOU WOULD BE BETTER OFF DEAD, OR OF HURTING YOURSELF: 0
6. FEELING BAD ABOUT YOURSELF - OR THAT YOU ARE A FAILURE OR HAVE LET YOURSELF OR YOUR FAMILY DOWN: 0
SUM OF ALL RESPONSES TO PHQ9 QUESTIONS 1 & 2: 4
4. FEELING TIRED OR HAVING LITTLE ENERGY: 3
10. IF YOU CHECKED OFF ANY PROBLEMS, HOW DIFFICULT HAVE THESE PROBLEMS MADE IT FOR YOU TO DO YOUR WORK, TAKE CARE OF THINGS AT HOME, OR GET ALONG WITH OTHER PEOPLE: 0
5. POOR APPETITE OR OVEREATING: 0
SUM OF ALL RESPONSES TO PHQ QUESTIONS 1-9: 12

## 2023-01-16 ASSESSMENT — SOCIAL DETERMINANTS OF HEALTH (SDOH): HOW HARD IS IT FOR YOU TO PAY FOR THE VERY BASICS LIKE FOOD, HOUSING, MEDICAL CARE, AND HEATING?: NOT HARD AT ALL

## 2023-01-16 NOTE — PROGRESS NOTES
[address]   Department of Family Medicine  Family Medicine Residency Program  E visit      Patient:  Michael Tracy 56 y.o. female          Chief complaint:   Depression follow up        History of Present Illness   The patient is a 56 y.o. female with a PMH of HTN, GERD, thyroid nodules, and COPD who presents to the clinic for the following medical concerns:    Chronic L ankle pain: Stable. Hx of fractures. Usually works 7-3:30 and does well. Needs spare spike on R foot. Uses aircast on L if needed. Paper work filled out from last visit. COPD/Asthma: Better in winter. Spiriva. Denies SOB or wheezing right now. Does have nebulizer but not requiring. On Claritin. HLD: Lipitor 10mg. Sometimes forgets. Lipid panel done and not at goal.  HTN: No longer running high at home. Doesn't use much salt. No HA, CP, or swelling. Is losing weight intentionally. Depression: Improving since starting zoloft 25mg. Work still stresses her out, but she is feeling overall better. Poor sleep. No energy. Feels down.  had sleep study [facility] no sleep apnea. Is getting out more, walking her doc a few times a week and is helping. Ringing in ears: Has been for months. Constant. R ear mostly. No hearing loss. No dizziness. Is around construction. PHQ Scores 1/16/2023 11/21/2022 2/5/2020 1/22/2020 2/11/2019 9/20/2018 3/26/2018   PHQ2 Score 4 6 6 6 6 6 6   PHQ9 Score 12 16 22 24 25 24 15     Interpretation of Total Score Depression Severity: 1-4 = Minimal depression, 5-9 = Mild depression, 10-14 = Moderate depression, 15-19 = Moderately severe depression, 20-27 = Severe depression     Health maintenance:  CT lung screen    Past Medical History:      Diagnosis Date    Adhesive capsulitis of right shoulder     s/p rotator cuff injury and repair; follows with Dr. Venus Boston at [facility]. Alcoholism ([address]     Quit 24 years ago. Allergic rhinosinusitis     Asthma     Rare use of albuterol.     Bilateral carpal tunnel syndrome 2018    left OR 10-2-19     COPD (chronic obstructive pulmonary disease) (HCC)     no oxygen, controlled with inhalers     Hidradenitis suppurativa 2012    Hyperlipidemia     on statin    Intertrigo 2012    Moderate single current episode of major depressive disorder ([address] 3/26/2018    Obesity     Onychomycosis 2012    Tobacco abuse        Past Surgical History:        Procedure Laterality Date    ANKLE SURGERY      left     CARPAL TUNNEL RELEASE Left 10/2/2019    LEFT ENDOSCOPIC CARPAL TUNNEL RELEASE, LEFT THUMB TRIGGER RELEASE performed by Elif Sandoval MD at [facility] [address] [place]  2018    [address] Poor prep, consider repeat 5-10 years. FRACTURE SURGERY      Left Tibia    FRACTURE SURGERY      Multiple, different injuries    HERNIA REPAIR  4227    Umbilical hernia    HI COLONOSCOPY FLX DX W/COLLJ SPEC WHEN PFRMD N/A 2018    COLONOSCOPY DIAGNOSTIC OR SCREENING performed by Mona Monreal MD at [address] HYSTEROSCOPY BX ENDOMETRIUM&/POLYPC W/WO D&C N/A 2018    DILATATION AND CURETTAGE HYSTEROSCOPY performed by Juan David Lopez MD at [address]  10/23/2012    Dr. Emma Zafar at [facility]. Allergies:    Bee venom and Pcn [penicillins]    Social History:   Social History     Tobacco Use    Smoking status: Former     Packs/day: 1.00     Years: 36.00     Pack years: 36.00     Types: Cigarettes     Quit date: 2018     Years since quittin.6    Smokeless tobacco: Never   Substance Use Topics    Alcohol use: No     Alcohol/week: 0.0 standard drinks     Comment: Previous alcoholism, 24 years clean.           Family History:       Problem Relation Age of Onset    Cancer Mother 37        CA in shoulder, lungs    High Blood Pressure Mother     Heart Disease Mother     Mental Illness Mother     High Cholesterol Mother     Arthritis Mother     Heart Attack Mother 43    Cancer Father 64        Throat CA    Substance Abuse Father Alcohol, throat CA    High Blood Pressure Father     High Cholesterol Father     Esophageal Cancer Father     Diabetes Maternal Aunt        Reviewof Systems:   Review of Systems Negative unless otherwise stated in HPI. Physical Exam   Physical Exam  Constitutional:       Appearance: Normal appearance. HENT:      Right Ear: Tympanic membrane normal.      Left Ear: Tympanic membrane normal.   Eyes:      Extraocular Movements: Extraocular movements intact. Conjunctiva/sclera: Conjunctivae normal.   Cardiovascular:      Rate and Rhythm: Normal rate and regular rhythm. Heart sounds: No murmur heard. No friction rub. No gallop. Pulmonary:      Effort: Pulmonary effort is normal.      Breath sounds: Normal breath sounds. Abdominal:      General: Abdomen is flat. Palpations: Abdomen is soft. Musculoskeletal:         General: No swelling or tenderness. Cervical back: Normal range of motion and neck supple. Right lower leg: No edema. Left lower leg: No edema. Skin:     General: Skin is warm and dry. Neurological:      Mental Status: She is alert and oriented to person, place, and time. Mental status is at baseline. Psychiatric:         Mood and Affect: Mood normal.         Thought Content:  Thought content normal.         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  No visible cause on exam today    History of fracture of left ankle  Paperwork completed  Cannot specify that patient can only work M-F due to medical illness as she requests  Advised that she may seek psychiatric workup if she feels mental illness prohibits her from working weekends    Primary hypertension  Controlled today  Advised low-salt diet  Advised smoking cessation   Continue amlodipine 5mg    Restrictive lung disease  Allergies  Stable  PFT 2018 showing restriction due to truncal obesity  The patient is asked to make an attempt to improve diet and exercise patterns to aid in medical management of this problem. Continue current inhalers, Spiriva and albuterol  Consider repeating PFTs as spiriva may not be indicated          Please see Patient Instructions for further counseling and information given. Advised to please be adherent to the treatment plans discussed today, and please call with any questions or concerns, letting the office know of any reasons that the plans may not be followed. The risks of untreated conditions include worsening illness, injury, disability, and possibly, death. Please call if symptoms change in any way, worsen, or fail to completely resolve, as this could necessitate a change to treatment plans. Patient and/or caregiver expressed understanding. Indications and proper use of medication(s) reviewed. Potential side-effects and risks of medication(s) also explained. Patient and/or caregiver was instructed to call if any new symptoms develop prior to next visit. Health risk factors discussed and addressed. Return to Office:  Follow up 3 mos or sooner if needed      Medication List:    Current Outpatient Medications   Medication Sig Dispense Refill    atorvastatin (LIPITOR) 10 MG tablet Take 1 tablet by mouth daily 90 tablet 3    amLODIPine (NORVASC) 5 MG tablet Take 1 tablet by mouth daily 30 tablet 3    sertraline (ZOLOFT) 25 MG tablet Take 1 tablet by mouth daily 30 tablet 3    EPINEPHrine (EPIPEN 2-CAMILLE) 0.3 MG/0.3ML SOAJ injection Use as directed for allergic reaction 2 each 1    albuterol sulfate HFA (PROVENTIL HFA) 108 (90 Base) MCG/ACT inhaler Inhale 2 puffs into the lungs every 6 hours as needed for Wheezing 1 Inhaler 3    loratadine (CLARITIN) 10 MG tablet Take 1 tablet by mouth daily 90 tablet 3    famotidine (PEPCID) 20 MG tablet Take 1 tablet by mouth 2 times daily 180 tablet 1    tiotropium (SPIRIVA HANDIHALER) 18 MCG inhalation capsule Inhale 1 capsule into the lungs daily (Patient taking differently: Inhale 18 mcg into the lungs daily Instructed to take am of procedure) 90 capsule 1    ipratropium-albuterol (DUONEB) 0.5-2.5 (3) MG/3ML SOLN nebulizer solution Inhale 3 mLs into the lungs every 6 hours as needed for Shortness of Breath 360 mL 1     No current facility-administered medications for this visit.         Shad Álvarez MD     Electronically signed by Shad Álvarez MD on 1/16/2023 at 2:45 PM

## 2023-01-17 ENCOUNTER — TELEPHONE (OUTPATIENT)
Dept: CASE MANAGEMENT | Age: 58
End: 2023-01-17

## 2023-01-17 NOTE — TELEPHONE ENCOUNTER
No call, encounter opened to process CT Lung Screening. CT Lung Screen: 1/16/2023    Impression   1. There is no pulmonary infiltrate, mass or suspicious pulmonary nodule   2. Benign calcified granulomas seen within the right and left lungs for which   all measure less than 5 mm. 3. Chronic pleuroparenchymal scarring seen within the right lung base   laterally. LUNG RADS:   Per ACR Lung-RADS Version 1.1       Category 2, Benign appearance or behavior. Management:  Continue annual lung screening with LDCT in 12 months. RECOMMENDATIONS:   If you would like to register your patient with the [facility], please contact the Nurse Navigator at   [phone]. Pack years: 39    Social History     Tobacco Use  Smoking Status: Former Smoker    Start Date:    Quit Date: 05/11/2018   Types: Cigarettes   Packs/Day: 1   Years: 39   Pack Years: 39   Smokeless Tobacco: Never         Results letter sent to patient via my chart or mailed.      [facility]

## 2023-04-17 ENCOUNTER — OFFICE VISIT (OUTPATIENT)
Dept: FAMILY MEDICINE CLINIC | Age: 58
End: 2023-04-17

## 2023-04-17 VITALS
HEART RATE: 72 BPM | WEIGHT: 223.44 LBS | SYSTOLIC BLOOD PRESSURE: 123 MMHG | TEMPERATURE: 98 F | DIASTOLIC BLOOD PRESSURE: 82 MMHG | BODY MASS INDEX: 37.18 KG/M2 | OXYGEN SATURATION: 96 %

## 2023-04-17 DIAGNOSIS — R41.3 MEMORY CHANGES: ICD-10-CM

## 2023-04-17 DIAGNOSIS — R73.09 ELEVATED HEMOGLOBIN A1C: ICD-10-CM

## 2023-04-17 DIAGNOSIS — I10 PRIMARY HYPERTENSION: Primary | ICD-10-CM

## 2023-04-17 DIAGNOSIS — J44.9 CHRONIC OBSTRUCTIVE PULMONARY DISEASE, UNSPECIFIED COPD TYPE (HCC): ICD-10-CM

## 2023-04-17 DIAGNOSIS — F32.4 MAJOR DEPRESSIVE DISORDER WITH SINGLE EPISODE, IN PARTIAL REMISSION (HCC): ICD-10-CM

## 2023-04-17 PROCEDURE — 3074F SYST BP LT 130 MM HG: CPT | Performed by: FAMILY MEDICINE

## 2023-04-17 PROCEDURE — 99214 OFFICE O/P EST MOD 30 MIN: CPT | Performed by: FAMILY MEDICINE

## 2023-04-17 PROCEDURE — 3079F DIAST BP 80-89 MM HG: CPT | Performed by: FAMILY MEDICINE

## 2023-04-17 SDOH — ECONOMIC STABILITY: FOOD INSECURITY: WITHIN THE PAST 12 MONTHS, YOU WORRIED THAT YOUR FOOD WOULD RUN OUT BEFORE YOU GOT MONEY TO BUY MORE.: NEVER TRUE

## 2023-04-17 SDOH — ECONOMIC STABILITY: FOOD INSECURITY: WITHIN THE PAST 12 MONTHS, THE FOOD YOU BOUGHT JUST DIDN'T LAST AND YOU DIDN'T HAVE MONEY TO GET MORE.: NEVER TRUE

## 2023-04-17 SDOH — ECONOMIC STABILITY: INCOME INSECURITY: HOW HARD IS IT FOR YOU TO PAY FOR THE VERY BASICS LIKE FOOD, HOUSING, MEDICAL CARE, AND HEATING?: NOT HARD AT ALL

## 2023-04-17 SDOH — ECONOMIC STABILITY: HOUSING INSECURITY
IN THE LAST 12 MONTHS, WAS THERE A TIME WHEN YOU DID NOT HAVE A STEADY PLACE TO SLEEP OR SLEPT IN A SHELTER (INCLUDING NOW)?: NO

## 2023-04-17 NOTE — PROGRESS NOTES
CC:  Follow up HTN    HPI:  56 y.o. female presents for follow up. HTN. Doing well on medication. No side effects. BP controlled. No symptoms. Reports no CP or SOB. Normal B/B function. Tinnitus. Was out of town; plans to reschedule audiology appointment. She has the information and telephone number at home and plans to call to reschedule. Working M-F with [employer]. Saturday is voluntary. Loud noises, no hearing protection allowed, she states. Depression and Anxiety. Doing better with medication. Mood has improved. Reports no SI or HI. Strong connections to family and friends. Feels forgetful at times. Has noticed that she has trouble finding the words at times, so it takes an extra few seconds, but she is able to say the right word again. Picking up things after work, has partner remind her. Happening for a while. No recent changes. No other symptoms noted with this. Able to recall good details about the situations in which she had been frustrated with her memory. CTS. Right hand keeps going numb. Had surgery on left previously; controlled. COPD. Spiriva intermittently. Rare use of albuterol. Tobacco quit date 5 years ago. Doing much better now. No symptoms on a regular basis.       Patient Active Problem List    Diagnosis Date Noted    Thyroid nodule 04/03/2019    Postmenopausal 08/10/2020    Pure hypercholesterolemia 05/08/2019    Gastroesophageal reflux disease 04/15/2019    Tinnitus of both ears 02/11/2019    Fall down stairs 12/10/2018    PMB (postmenopausal bleeding) 09/06/2018    Shortness of breath     Pulmonary nodules 05/16/2018    Bee sting allergy 05/04/2018    Post-traumatic arthritis of left ankle 04/24/2018    Bilateral carpal tunnel syndrome 03/26/2018    Moderate single current episode of major depressive disorder ([facility] 03/26/2018    Biceps tendonitis of both shoulders 05/12/2016    Right shoulder pain 11/05/2013    Hidradenitis

## 2023-04-17 NOTE — PATIENT INSTRUCTIONS
Elly Preciado is a 68 year old female with past medical history significant for Osteoarthritis of her hands and back, Morbid Obesity, BPPV, Nephrolithiasis in the past and some palpitations here today for a full physical exam.     She Is still following up with cardiology and has an exercise stress test scheduled for January.   She has had no further palpitations / tachycardia since.     She reports that her diet is ok. She continues to gain weight.  She will need to start on a low carb diet.     I obtained an EKG and patient which showed normal sinus tachycardia.  Patient's rate was 105.  Patient has no evidence of atrial fibrillation per review of the EKG.    She had covid19 x 2 in the past.     She had a normal mammogram in 2021.  I will order a repeat mammogram today.   She has never had a dexa scan, I will order one today.     She is declining a flu shot.   Her last pap smear was in .     Her last colonoscopy was 2019, repeat in five years.     Immunization History   Administered Date(s) Administered   • Td:Adult type tetanus/diphtheria 2021   • Tdap 2010     She is declining a pneumovax today.     Allerg: ALLERGIES:  Cimetidine and Penicillins  Current Outpatient Medications   Medication Sig Dispense Refill   • VITAMIN D PO Take 1 tablet by mouth daily.     • Ascorbic Acid (VITAMIN C PO) Take 1 tablet by mouth daily.     • metoPROLOL succinate (TOPROL-XL) 25 MG 24 hr tablet Take 1 tablet by mouth daily. 90 tablet 3   • MegaRed Omega-3 Krill Oil 350 MG Cap Take 1 capsule by mouth daily.     • Multiple Vitamins-Minerals (ONE-A-DAY WOMENS 50 PLUS PO) Take 1 tablet by mouth daily.     • aspirin 81 MG tablet Take 81 mg by mouth daily.      • Lactobacillus Acid-Pectin (ACIDOPHILUS-PECTIN) capsule Take 1 capsule by mouth daily.      • Coenzyme Q10 (CO Q 10) 100 MG Cap Take 1 capsule by mouth daily.        No current facility-administered medications for this visit.     Gyne History: ,  Please talk to your pharmacist about receiving a vaccine called Shingrix. This vaccine is recommended for all adults over the age of 48 to help prevent a painful disease called Shingles. It is a 2-dose series given 2-6 months apart. This vaccine is available at most pharmacies without a prescription from a physician. Be sure that the vaccine is covered by your insurance before receiving it. Please contact me with any questions or concerns. Thank you! all , no complications, no GDM  Last Pap smear:  and normal  Last Mammogram: 20    Surgical History: None     Social History:  No Tobacco (did smoke 1/2 ppd x 5 years over 40 years ago)  No Alcohol   , 6 children (all girls) - one was adopted    Family History:   Father,  in his early 70's, + prostate cancer, alcoholic  Mother,  at age 90 of breast cancer (diagnosed at age 85)  No known family history of cad or Diabetes  No known family history of colon cancer, ovarian cancer or cervical cancer    ROS:  GENERAL: no recent weight changes, no fatigue, no fevers, no chills, no heat or cold intolerances  HEENT: no headache, no dizziness, no visual problems, no ear pain, no difficulty swallowing, no sore throat, no nasal or sinus congestion, no cough  PULMONARY: no sob, no wheezing, no pain with deep inspiration  CARDIAC: no chest pain, positive palpitations, positive episodes of tachycardia  GI: no abdominal pain, no changes in bowel habits, no diarrhea, no constipation, no hematochezia, no hemmorhoids  : no changes in bladder function, no dysuria, no frequency, no incontinence, no discharge  EXTREM: no joint or muscle concerns, no peripheral edema  NEURO: no difficulties with movement or sensation, no memory problems, no numbness or tingling in any area of body.      P/E:  /84   Pulse 80   Temp 96.7 °F (35.9 °C) (Temporal)   Ht 5' 2\" (1.575 m)   Wt 112 kg (247 lb)   LMP  (LMP Unknown)   BMI 45.18 kg/m²   BSA 2.09 m²   General: A&Ox3, nad, healthy appearing  Heent: perrla, eomi, op clear, no lad, no nasal congestion, neck supple  Lungs: cta b, no r/r/w, no dullness to percussion, good air movement  CV: rrr, no murmurs, S1 and S2 wnl, no S3, no S4, non-displaced PMI  ABD: soft, nt, nd, +bs, no masses, no hsm, no guarding, no rebound  EXTREM: no c/c/e, pulses full and equal, no calf tenderness    A/P:   Routine medical exam  (primary encounter diagnosis)  Healthy 68-year-old  female here today for full physical exam.  Patient's exam is normal.    Heart palpitations / Abnormal Holter monitor finding  Patient stable and doing well.  Patient is doing well on metoprolol.  Patient denies any recent palpitations.  Patient will follow-up with cardiology as noted.    Screening for osteoporosis  I will order needed screening DEXA scan.  Patient will call and schedule this in the near future.    Encounter for screening mammogram for malignant neoplasm of breast  I will order needed screening mammogram.  Patient will call and schedule this in the near future.    Electronically signed by: Mike Strange MD  11/2/2022

## 2023-06-02 ENCOUNTER — HOSPITAL ENCOUNTER (OUTPATIENT)
Dept: AUDIOLOGY | Age: 58
Discharge: HOME | End: 2023-06-02

## 2023-06-02 PROCEDURE — 92557 COMPREHENSIVE HEARING TEST: CPT | Performed by: AUDIOLOGIST

## 2023-06-02 PROCEDURE — 92567 TYMPANOMETRY: CPT | Performed by: AUDIOLOGIST

## 2023-06-02 NOTE — PROGRESS NOTES
AUDIOMETRIC EVALUATION    REASON FOR REFERRAL:  This patient was referred for audiometric testing by Chriss Vizcarra DO due to difficulty hearing in group and noisy situations. She says she constantly says \"huh\", even in quiet situations. She reports tinnitus, bilateral, which is worse on the right. She feels she hears better in the left ear. She denies otalgia, fullness, dizziness and drainage. She reports history of occupational noise exposure. RESULTS:  Pure tone audiometric testing using earphones and insert earphones   was carried out. Results revealed air conduction thresholds averaging 25 dBHL through 2000 Hz. Speech reception thresholds were obtained at 15 dBHL. Speech discrimination testing was performed at 50 dBHL. Obtained were scores of 100%. Masked Bone Conduction Testing revealed minimal air bone gap at 500 Hz bilaterally. Tympanometry was administered and revealed normal peak pressure and compliance bilaterally. IMPRESSION:  Todays results revealed borderline normal hearing, bilaterally, with slightly worse thresholds for select high frequencies in the right ear. Speech testing was in agreement with the pure tones, bilaterally. Speech discrimination was excellent bilaterally. Impedance testing revealed essentially normal middle ear function bilaterally. An ENT consult is suggested if you feel it is clinically warranted due to asymmetrical tinnitus and slight asymmetry on hearing test. A re-evaluation is recommended annually, or if a change in hearing is noted. Hearing aids were discussed due to difficulty with communication, and she will think about it. The above results were reviewed with the patient. If I can be of further assistance or provide additional information, please do not hesitate to contact this office.       Thank you for the referral.      Farooq Amor M.A., [address] [facility] [id number]  Electronically signed by Miley Bowman on 6/2/2023 at

## 2023-07-03 NOTE — TELEPHONE ENCOUNTER
Last Appointment:  4/17/2023  Future Appointments   Date Time Provider [address]   7/6/2023  9:40 AM MD Danny Luis [facility]

## 2023-07-06 ENCOUNTER — OFFICE VISIT (OUTPATIENT)
Dept: FAMILY MEDICINE CLINIC | Age: 58
End: 2023-07-06

## 2023-07-06 VITALS
SYSTOLIC BLOOD PRESSURE: 131 MMHG | HEIGHT: 65 IN | DIASTOLIC BLOOD PRESSURE: 75 MMHG | OXYGEN SATURATION: 97 % | WEIGHT: 226 LBS | TEMPERATURE: 97.4 F | HEART RATE: 72 BPM | BODY MASS INDEX: 37.65 KG/M2

## 2023-07-06 DIAGNOSIS — R73.09 ELEVATED HEMOGLOBIN A1C: ICD-10-CM

## 2023-07-06 DIAGNOSIS — F32.A ANXIETY AND DEPRESSION: ICD-10-CM

## 2023-07-06 DIAGNOSIS — F41.9 ANXIETY AND DEPRESSION: ICD-10-CM

## 2023-07-06 DIAGNOSIS — J44.9 CHRONIC OBSTRUCTIVE PULMONARY DISEASE, UNSPECIFIED COPD TYPE (HCC): ICD-10-CM

## 2023-07-06 DIAGNOSIS — G56.03 BILATERAL CARPAL TUNNEL SYNDROME: ICD-10-CM

## 2023-07-06 DIAGNOSIS — I10 PRIMARY HYPERTENSION: Primary | ICD-10-CM

## 2023-07-06 DIAGNOSIS — I10 PRIMARY HYPERTENSION: ICD-10-CM

## 2023-07-06 DIAGNOSIS — K21.9 GASTROESOPHAGEAL REFLUX DISEASE, UNSPECIFIED WHETHER ESOPHAGITIS PRESENT: ICD-10-CM

## 2023-07-06 LAB
ALBUMIN SERPL-MCNC: 4.1 G/DL (ref 3.5–5.2)
ALP SERPL-CCNC: 136 U/L (ref 35–104)
ALT SERPL-CCNC: 25 U/L (ref 0–32)
ANION GAP SERPL CALCULATED.3IONS-SCNC: 12 MMOL/L (ref 7–16)
AST SERPL-CCNC: 24 U/L (ref 0–31)
BILIRUB SERPL-MCNC: 0.3 MG/DL (ref 0–1.2)
BUN SERPL-MCNC: 8 MG/DL (ref 6–20)
CALCIUM SERPL-MCNC: 9.5 MG/DL (ref 8.6–10.2)
CHLORIDE SERPL-SCNC: 100 MMOL/L (ref 98–107)
CHOLESTEROL, TOTAL: 187 MG/DL (ref 0–199)
CO2 SERPL-SCNC: 27 MMOL/L (ref 22–29)
CREAT SERPL-MCNC: 0.6 MG/DL (ref 0.5–1)
ERYTHROCYTE [DISTWIDTH] IN BLOOD BY AUTOMATED COUNT: 12.6 FL (ref 11.5–15)
GLUCOSE SERPL-MCNC: 141 MG/DL (ref 74–99)
HBA1C MFR BLD: 6.8 % (ref 4–5.6)
HCT VFR BLD AUTO: 48.4 % (ref 34–48)
HDLC SERPL-MCNC: 43 MG/DL
HGB BLD-MCNC: 15.3 G/DL (ref 11.5–15.5)
LDLC SERPL CALC-MCNC: 122 MG/DL (ref 0–99)
MCH RBC QN AUTO: 31.2 PG (ref 26–35)
MCHC RBC AUTO-ENTMCNC: 31.6 % (ref 32–34.5)
MCV RBC AUTO: 98.8 FL (ref 80–99.9)
PLATELET # BLD AUTO: 355 E9/L (ref 130–450)
PMV BLD AUTO: 9.1 FL (ref 7–12)
POTASSIUM SERPL-SCNC: 5 MMOL/L (ref 3.5–5)
PROT SERPL-MCNC: 7.8 G/DL (ref 6.4–8.3)
RBC # BLD AUTO: 4.9 E12/L (ref 3.5–5.5)
SODIUM SERPL-SCNC: 139 MMOL/L (ref 132–146)
TRIGL SERPL-MCNC: 109 MG/DL (ref 0–149)
TSH SERPL-MCNC: 1.43 UIU/ML (ref 0.27–4.2)
VLDLC SERPL CALC-MCNC: 22 MG/DL
WBC # BLD: 9.4 E9/L (ref 4.5–11.5)

## 2023-07-06 PROCEDURE — 36415 COLL VENOUS BLD VENIPUNCTURE: CPT | Performed by: FAMILY MEDICINE

## 2023-07-06 RX ORDER — AMLODIPINE BESYLATE 5 MG/1
5 TABLET ORAL DAILY
Qty: 30 TABLET | Refills: 3 | Status: SHIPPED | OUTPATIENT
Start: 2023-07-06

## 2023-07-09 PROBLEM — F41.9 ANXIETY AND DEPRESSION: Status: ACTIVE | Noted: 2023-07-09

## 2023-07-09 PROBLEM — F32.A ANXIETY AND DEPRESSION: Status: ACTIVE | Noted: 2023-07-09

## 2023-08-31 ENCOUNTER — OFFICE VISIT (OUTPATIENT)
Dept: FAMILY MEDICINE CLINIC | Age: 58
End: 2023-08-31

## 2023-08-31 VITALS
WEIGHT: 226 LBS | TEMPERATURE: 98.1 F | HEART RATE: 75 BPM | OXYGEN SATURATION: 95 % | SYSTOLIC BLOOD PRESSURE: 131 MMHG | BODY MASS INDEX: 37.61 KG/M2 | DIASTOLIC BLOOD PRESSURE: 77 MMHG

## 2023-08-31 DIAGNOSIS — Z01.419 ENCOUNTER FOR WELL WOMAN EXAM: Primary | ICD-10-CM

## 2023-08-31 PROCEDURE — 3074F SYST BP LT 130 MM HG: CPT

## 2023-08-31 PROCEDURE — 3078F DIAST BP <80 MM HG: CPT

## 2023-08-31 PROCEDURE — 99213 OFFICE O/P EST LOW 20 MIN: CPT

## 2023-09-06 LAB
HPV SAMPLE: NORMAL
HPV SOURCE: NORMAL
HPV, GENOTYPE 16: NOT DETECTED
HPV, GENOTYPE 18: NOT DETECTED
HPV, HIGH RISK OTHER: NOT DETECTED
HPV, INTERPRETATION: NORMAL

## 2023-09-07 LAB — GYNECOLOGY CYTOLOGY REPORT: NORMAL

## 2023-09-08 DIAGNOSIS — F32.A ANXIETY AND DEPRESSION: ICD-10-CM

## 2023-09-08 DIAGNOSIS — F41.9 ANXIETY AND DEPRESSION: ICD-10-CM

## 2023-12-03 DIAGNOSIS — I10 PRIMARY HYPERTENSION: ICD-10-CM

## 2023-12-03 DIAGNOSIS — F32.A ANXIETY AND DEPRESSION: ICD-10-CM

## 2023-12-03 DIAGNOSIS — F41.9 ANXIETY AND DEPRESSION: ICD-10-CM

## 2023-12-03 DIAGNOSIS — E78.00 PURE HYPERCHOLESTEROLEMIA: ICD-10-CM

## 2023-12-04 RX ORDER — AMLODIPINE BESYLATE 5 MG/1
5 TABLET ORAL DAILY
Qty: 90 TABLET | Refills: 0 | Status: SHIPPED | OUTPATIENT
Start: 2023-12-04

## 2023-12-04 RX ORDER — ATORVASTATIN CALCIUM 10 MG/1
10 TABLET, FILM COATED ORAL DAILY
Qty: 90 TABLET | Refills: 0 | Status: SHIPPED | OUTPATIENT
Start: 2023-12-04

## 2024-02-27 ASSESSMENT — PATIENT HEALTH QUESTIONNAIRE - PHQ9
3. TROUBLE FALLING OR STAYING ASLEEP: SEVERAL DAYS
2. FEELING DOWN, DEPRESSED OR HOPELESS: SEVERAL DAYS
SUM OF ALL RESPONSES TO PHQ QUESTIONS 1-9: 6
8. MOVING OR SPEAKING SO SLOWLY THAT OTHER PEOPLE COULD HAVE NOTICED. OR THE OPPOSITE - BEING SO FIDGETY OR RESTLESS THAT YOU HAVE BEEN MOVING AROUND A LOT MORE THAN USUAL: NOT AT ALL
SUM OF ALL RESPONSES TO PHQ9 QUESTIONS 1 & 2: 2
2. FEELING DOWN, DEPRESSED OR HOPELESS: 1
8. MOVING OR SPEAKING SO SLOWLY THAT OTHER PEOPLE COULD HAVE NOTICED. OR THE OPPOSITE, BEING SO FIGETY OR RESTLESS THAT YOU HAVE BEEN MOVING AROUND A LOT MORE THAN USUAL: 0
9. THOUGHTS THAT YOU WOULD BE BETTER OFF DEAD, OR OF HURTING YOURSELF: 0
4. FEELING TIRED OR HAVING LITTLE ENERGY: SEVERAL DAYS
6. FEELING BAD ABOUT YOURSELF - OR THAT YOU ARE A FAILURE OR HAVE LET YOURSELF OR YOUR FAMILY DOWN: 1
7. TROUBLE CONCENTRATING ON THINGS, SUCH AS READING THE NEWSPAPER OR WATCHING TELEVISION: 1
10. IF YOU CHECKED OFF ANY PROBLEMS, HOW DIFFICULT HAVE THESE PROBLEMS MADE IT FOR YOU TO DO YOUR WORK, TAKE CARE OF THINGS AT HOME, OR GET ALONG WITH OTHER PEOPLE: VERY DIFFICULT
7. TROUBLE CONCENTRATING ON THINGS, SUCH AS READING THE NEWSPAPER OR WATCHING TELEVISION: SEVERAL DAYS
10. IF YOU CHECKED OFF ANY PROBLEMS, HOW DIFFICULT HAVE THESE PROBLEMS MADE IT FOR YOU TO DO YOUR WORK, TAKE CARE OF THINGS AT HOME, OR GET ALONG WITH OTHER PEOPLE: 2
1. LITTLE INTEREST OR PLEASURE IN DOING THINGS: SEVERAL DAYS
1. LITTLE INTEREST OR PLEASURE IN DOING THINGS: 1
5. POOR APPETITE OR OVEREATING: 0
9. THOUGHTS THAT YOU WOULD BE BETTER OFF DEAD, OR OF HURTING YOURSELF: NOT AT ALL
5. POOR APPETITE OR OVEREATING: NOT AT ALL
6. FEELING BAD ABOUT YOURSELF - OR THAT YOU ARE A FAILURE OR HAVE LET YOURSELF OR YOUR FAMILY DOWN: SEVERAL DAYS
SUM OF ALL RESPONSES TO PHQ QUESTIONS 1-9: 6
4. FEELING TIRED OR HAVING LITTLE ENERGY: 1
SUM OF ALL RESPONSES TO PHQ QUESTIONS 1-9: 6
3. TROUBLE FALLING OR STAYING ASLEEP: 1
SUM OF ALL RESPONSES TO PHQ QUESTIONS 1-9: 6
SUM OF ALL RESPONSES TO PHQ QUESTIONS 1-9: 6

## 2024-03-01 ENCOUNTER — OFFICE VISIT (OUTPATIENT)
Dept: FAMILY MEDICINE CLINIC | Age: 59
End: 2024-03-01

## 2024-03-01 VITALS
TEMPERATURE: 97.5 F | DIASTOLIC BLOOD PRESSURE: 84 MMHG | HEIGHT: 65 IN | HEART RATE: 71 BPM | BODY MASS INDEX: 35.82 KG/M2 | SYSTOLIC BLOOD PRESSURE: 130 MMHG | OXYGEN SATURATION: 97 % | RESPIRATION RATE: 16 BRPM | WEIGHT: 215 LBS

## 2024-03-01 DIAGNOSIS — E78.00 PURE HYPERCHOLESTEROLEMIA: ICD-10-CM

## 2024-03-01 DIAGNOSIS — G56.03 BILATERAL CARPAL TUNNEL SYNDROME: ICD-10-CM

## 2024-03-01 DIAGNOSIS — Z12.31 ENCOUNTER FOR SCREENING MAMMOGRAM FOR MALIGNANT NEOPLASM OF BREAST: ICD-10-CM

## 2024-03-01 DIAGNOSIS — I10 PRIMARY HYPERTENSION: ICD-10-CM

## 2024-03-01 DIAGNOSIS — K21.9 GASTROESOPHAGEAL REFLUX DISEASE, UNSPECIFIED WHETHER ESOPHAGITIS PRESENT: ICD-10-CM

## 2024-03-01 DIAGNOSIS — E11.9 TYPE 2 DIABETES MELLITUS WITHOUT COMPLICATION, WITHOUT LONG-TERM CURRENT USE OF INSULIN (HCC): ICD-10-CM

## 2024-03-01 DIAGNOSIS — M54.50 ACUTE LEFT-SIDED LOW BACK PAIN WITHOUT SCIATICA: Primary | ICD-10-CM

## 2024-03-01 DIAGNOSIS — F41.9 ANXIETY AND DEPRESSION: ICD-10-CM

## 2024-03-01 DIAGNOSIS — J30.2 SEASONAL ALLERGIC RHINITIS, UNSPECIFIED TRIGGER: ICD-10-CM

## 2024-03-01 DIAGNOSIS — Z12.11 COLON CANCER SCREENING: ICD-10-CM

## 2024-03-01 DIAGNOSIS — Z72.0 TOBACCO ABUSE: ICD-10-CM

## 2024-03-01 DIAGNOSIS — F32.A ANXIETY AND DEPRESSION: ICD-10-CM

## 2024-03-01 LAB
ANION GAP SERPL CALCULATED.3IONS-SCNC: 10 MMOL/L (ref 7–16)
BUN BLDV-MCNC: 10 MG/DL (ref 6–20)
CALCIUM SERPL-MCNC: 9.5 MG/DL (ref 8.6–10.2)
CHLORIDE BLD-SCNC: 104 MMOL/L (ref 98–107)
CO2: 28 MMOL/L (ref 22–29)
CREAT SERPL-MCNC: 0.7 MG/DL (ref 0.5–1)
CREATININE URINE POCT: NORMAL
GFR SERPL CREATININE-BSD FRML MDRD: >60 ML/MIN/1.73M2
GLUCOSE BLD-MCNC: 139 MG/DL (ref 74–99)
HBA1C MFR BLD: 6.5 % (ref 4–5.6)
MICROALBUMIN/CREAT 24H UR: NORMAL MG/G{CREAT}
MICROALBUMIN/CREAT UR-RTO: NORMAL
POTASSIUM SERPL-SCNC: 5.2 MMOL/L (ref 3.5–5)
SODIUM BLD-SCNC: 142 MMOL/L (ref 132–146)

## 2024-03-01 PROCEDURE — 99213 OFFICE O/P EST LOW 20 MIN: CPT

## 2024-03-01 PROCEDURE — 3079F DIAST BP 80-89 MM HG: CPT

## 2024-03-01 PROCEDURE — 3075F SYST BP GE 130 - 139MM HG: CPT

## 2024-03-01 PROCEDURE — 36415 COLL VENOUS BLD VENIPUNCTURE: CPT | Performed by: FAMILY MEDICINE

## 2024-03-01 PROCEDURE — 82044 UR ALBUMIN SEMIQUANTITATIVE: CPT

## 2024-03-01 PROCEDURE — 3044F HG A1C LEVEL LT 7.0%: CPT

## 2024-03-01 RX ORDER — ATORVASTATIN CALCIUM 10 MG/1
10 TABLET, FILM COATED ORAL DAILY
Qty: 90 TABLET | Refills: 0 | Status: SHIPPED | OUTPATIENT
Start: 2024-03-01

## 2024-03-01 RX ORDER — AMLODIPINE BESYLATE 5 MG/1
5 TABLET ORAL DAILY
Qty: 90 TABLET | Refills: 0 | Status: SHIPPED | OUTPATIENT
Start: 2024-03-01

## 2024-03-01 RX ORDER — LORATADINE 10 MG/1
10 TABLET ORAL DAILY
Qty: 90 TABLET | Refills: 3 | Status: SHIPPED | OUTPATIENT
Start: 2024-03-01

## 2024-03-01 RX ORDER — FAMOTIDINE 20 MG/1
20 TABLET, FILM COATED ORAL NIGHTLY
Qty: 180 TABLET | Refills: 1 | Status: SHIPPED | OUTPATIENT
Start: 2024-03-01

## 2024-03-01 NOTE — PROGRESS NOTES
Patient is a 58-year-old female with history of GERD, high BMI, COPD, depression anxiety who presents today for follow-up on chronic conditions and medical refills.    She has no complaints today.  She has embarked on her weight loss journey and has lost 10 pounds since last visit.  She has been exercising more, walking more often.  History of ankle fracture on the left ankle.  Now she is able to walk up to 2 miles a day, however that ankle will start to hurt at the end of the day.  She has a boot/braces that she uses to relieve the pain.  Does not need extra help in that regard.    She was newly diagnosed with diabetes with last HbA1c is 6.8.  She denies any headaches, chest pains, shortness of breath, nausea, vomiting, diarrhea, constipation.  Please recheck today.  All other chronic conditions is stable.  She is doing well mentally, denies any suicidal ideation/homicidal ideation.  She states she is in good place.    Left lower back pain.  Onset 2 days ago, thinks she slept on it wrong.  Denies fever/chills/dysuria/paresthesia of perineal region.  Uses conservative treatment with relief.    Blood pressure 130/84, pulse 71, temperature 97.5 °F (36.4 °C), resp. rate 16, height 1.651 m (5' 5\"), weight 97.5 kg (215 lb), last menstrual period 09/13/2012, SpO2 97 %, not currently breastfeeding.    HEENT WNL     Heart regular    Lungs clear    abd non-tender      trace edema on bilateral ankles    pulses intact     Assessment/plan:  1.BMI 35-continue weight loss journey.  2.lower back pain-continue conservative treatment, follow-up in office in 2 weeks if not improving  3.diabetes-recheck HbA1c and BMP today  4.health maintenance-due for colonoscopy, mammogram for breast cancer screening, low-dose CT scan for long-term tobacco abuse.    Attending Physician Statement  I have discussed the case, including pertinent history and exam findings with the resident. I agree with the documented assessment and plan.

## 2024-03-01 NOTE — PROGRESS NOTES
[facility]  FAMILY MEDICINE RESIDENCY PROGRAM  DATE OF VISIT : 3/1/2024    Patient : Delta Gomez   Age : 58 y.o.    : 1965   MRN : [id number]   ______________________________________________________________________    Chief Complaint :   Chief Complaint   Patient presents with    Hypertension    Depression       HPI : Delta Gomez is 58 y.o. female who presented to the clinic today for follow up of chronic conditions.    Back pain  L paraspinal region. Slept on it wrong. No midline tenderness  Denies any paresthesias in the perineal region, fevers or chills    Working on weight management  Does have ankle boot and braces for walking. She can do 2 miles. And it does get swollen.  Had a question about cervical ca risk with obesity.  Advised patient that is not the case, that could be transition of fat cells to DHEA for other hormonal dependent cancers but again without significant risk    Depression/anxiety-patient states she works a stressful job.  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  Works with loud noises.  Has tinnitus constantly, had hearing test done.  20 on left and 30 on right.  She is on Zoloft 50 daily.  Reports that she feels better, more energy, less stressful with the drug. Denies any SI/HI today.     Hypertension-blood pressure is around 130//90 at home.  She is on Norvasc 5 and Lipitor 10.  Denies headache, dizziness, blurry vision.     COPD/history of tobacco use-on Spiriva daily. Currently denies any dyspnea, shortness of breath, mucus production.     History of GERD-patient states since starting Pepcid 20 nightly she has not had any reflux symptoms. Buy OTC      Carpal tunnel syndrome-patient utilizes splints at night, states sometimes on the right wrist still has pain in the day but manageable.  No plans for surgery at the moment.      Last Visit  : 2023    Health Maintenance :  Health Maintenance Due

## 2024-03-04 PROBLEM — M54.50 ACUTE LEFT-SIDED LOW BACK PAIN WITHOUT SCIATICA: Status: ACTIVE | Noted: 2024-03-04

## 2024-03-11 ENCOUNTER — HOSPITAL ENCOUNTER (INPATIENT)
Age: 59
LOS: 1 days | Discharge: HOME | DRG: 418 | End: 2024-03-12
Attending: FAMILY MEDICINE | Admitting: FAMILY MEDICINE

## 2024-03-11 ENCOUNTER — ANESTHESIA EVENT (OUTPATIENT)
Dept: OPERATING ROOM | Age: 59
DRG: 418 | End: 2024-03-11

## 2024-03-11 ENCOUNTER — APPOINTMENT (OUTPATIENT)
Dept: CT IMAGING | Age: 59
DRG: 418 | End: 2024-03-11

## 2024-03-11 ENCOUNTER — ANESTHESIA (OUTPATIENT)
Dept: OPERATING ROOM | Age: 59
DRG: 418 | End: 2024-03-11

## 2024-03-11 DIAGNOSIS — K81.0 ACUTE CHOLECYSTITIS: Primary | ICD-10-CM

## 2024-03-11 LAB
ALBUMIN SERPL-MCNC: 4.2 G/DL (ref 3.5–5.2)
ALP SERPL-CCNC: 127 U/L (ref 35–104)
ALT SERPL-CCNC: 20 U/L (ref 0–32)
ANION GAP SERPL CALCULATED.3IONS-SCNC: 11 MMOL/L (ref 7–16)
AST SERPL-CCNC: 14 U/L (ref 0–31)
BACTERIA URNS QL MICRO: ABNORMAL
BASOPHILS # BLD: 0.06 K/UL (ref 0–0.2)
BASOPHILS NFR BLD: 0 % (ref 0–2)
BILIRUB SERPL-MCNC: 0.9 MG/DL (ref 0–1.2)
BILIRUB UR QL STRIP: NEGATIVE
BUN SERPL-MCNC: 8 MG/DL (ref 6–20)
CALCIUM SERPL-MCNC: 9.3 MG/DL (ref 8.6–10.2)
CHLORIDE SERPL-SCNC: 99 MMOL/L (ref 98–107)
CLARITY UR: CLEAR
CO2 SERPL-SCNC: 29 MMOL/L (ref 22–29)
COLOR UR: YELLOW
CREAT SERPL-MCNC: 0.7 MG/DL (ref 0.5–1)
EOSINOPHIL # BLD: 0.08 K/UL (ref 0.05–0.5)
EOSINOPHILS RELATIVE PERCENT: 0 % (ref 0–6)
EPI CELLS #/AREA URNS HPF: ABNORMAL /HPF
ERYTHROCYTE [DISTWIDTH] IN BLOOD BY AUTOMATED COUNT: 12.9 % (ref 11.5–15)
GFR SERPL CREATININE-BSD FRML MDRD: >60 ML/MIN/1.73M2
GLUCOSE SERPL-MCNC: 122 MG/DL (ref 74–99)
GLUCOSE UR STRIP-MCNC: 100 MG/DL
HCT VFR BLD AUTO: 47.3 % (ref 34–48)
HGB BLD-MCNC: 15.9 G/DL (ref 11.5–15.5)
HGB UR QL STRIP.AUTO: ABNORMAL
IMM GRANULOCYTES # BLD AUTO: 0.11 K/UL (ref 0–0.58)
IMM GRANULOCYTES NFR BLD: 1 % (ref 0–5)
KETONES UR STRIP-MCNC: NEGATIVE MG/DL
LACTATE BLDV-SCNC: 1.1 MMOL/L (ref 0.5–2.2)
LEUKOCYTE ESTERASE UR QL STRIP: ABNORMAL
LIPASE SERPL-CCNC: 13 U/L (ref 13–60)
LYMPHOCYTES NFR BLD: 1.71 K/UL (ref 1.5–4)
LYMPHOCYTES RELATIVE PERCENT: 8 % (ref 20–42)
MCH RBC QN AUTO: 32.1 PG (ref 26–35)
MCHC RBC AUTO-ENTMCNC: 33.6 G/DL (ref 32–34.5)
MCV RBC AUTO: 95.6 FL (ref 80–99.9)
MONOCYTES NFR BLD: 1.2 K/UL (ref 0.1–0.95)
MONOCYTES NFR BLD: 6 % (ref 2–12)
NEUTROPHILS NFR BLD: 85 % (ref 43–80)
NEUTS SEG NFR BLD: 17.27 K/UL (ref 1.8–7.3)
NITRITE UR QL STRIP: NEGATIVE
PH UR STRIP: 6.5 [PH] (ref 5–9)
PLATELET # BLD AUTO: 341 K/UL (ref 130–450)
PMV BLD AUTO: 9 FL (ref 7–12)
POTASSIUM SERPL-SCNC: 3.9 MMOL/L (ref 3.5–5)
PROT SERPL-MCNC: 7.8 G/DL (ref 6.4–8.3)
PROT UR STRIP-MCNC: NEGATIVE MG/DL
RBC # BLD AUTO: 4.95 M/UL (ref 3.5–5.5)
RBC #/AREA URNS HPF: ABNORMAL /HPF
SODIUM SERPL-SCNC: 139 MMOL/L (ref 132–146)
SP GR UR STRIP: 1.01 (ref 1–1.03)
UROBILINOGEN UR STRIP-ACNC: 4 EU/DL (ref 0–1)
WBC #/AREA URNS HPF: ABNORMAL /HPF
WBC OTHER # BLD: 20.4 K/UL (ref 4.5–11.5)

## 2024-03-11 PROCEDURE — 6360000002 HC RX W HCPCS: Performed by: ANESTHESIOLOGIST ASSISTANT

## 2024-03-11 PROCEDURE — 88304 TISSUE EXAM BY PATHOLOGIST: CPT

## 2024-03-11 PROCEDURE — 74177 CT ABD & PELVIS W/CONTRAST: CPT

## 2024-03-11 PROCEDURE — 2500000003 HC RX 250 WO HCPCS: Performed by: ANESTHESIOLOGIST ASSISTANT

## 2024-03-11 PROCEDURE — 81001 URINALYSIS AUTO W/SCOPE: CPT

## 2024-03-11 PROCEDURE — 7100000000 HC PACU RECOVERY - FIRST 15 MIN: Performed by: STUDENT IN AN ORGANIZED HEALTH CARE EDUCATION/TRAINING PROGRAM

## 2024-03-11 PROCEDURE — 6360000004 HC RX CONTRAST MEDICATION: Performed by: RADIOLOGY

## 2024-03-11 PROCEDURE — 99285 EMERGENCY DEPT VISIT HI MDM: CPT

## 2024-03-11 PROCEDURE — 96361 HYDRATE IV INFUSION ADD-ON: CPT

## 2024-03-11 PROCEDURE — 2580000003 HC RX 258

## 2024-03-11 PROCEDURE — 85025 COMPLETE CBC W/AUTO DIFF WBC: CPT

## 2024-03-11 PROCEDURE — 2580000003 HC RX 258: Performed by: ANESTHESIOLOGIST ASSISTANT

## 2024-03-11 PROCEDURE — 6360000002 HC RX W HCPCS

## 2024-03-11 PROCEDURE — 83605 ASSAY OF LACTIC ACID: CPT

## 2024-03-11 PROCEDURE — 0FT44ZZ RESECTION OF GALLBLADDER, PERCUTANEOUS ENDOSCOPIC APPROACH: ICD-10-PCS | Performed by: FAMILY MEDICINE

## 2024-03-11 PROCEDURE — 87086 URINE CULTURE/COLONY COUNT: CPT

## 2024-03-11 PROCEDURE — 6360000002 HC RX W HCPCS: Performed by: STUDENT IN AN ORGANIZED HEALTH CARE EDUCATION/TRAINING PROGRAM

## 2024-03-11 PROCEDURE — 94640 AIRWAY INHALATION TREATMENT: CPT

## 2024-03-11 PROCEDURE — 2720000010 HC SURG SUPPLY STERILE: Performed by: STUDENT IN AN ORGANIZED HEALTH CARE EDUCATION/TRAINING PROGRAM

## 2024-03-11 PROCEDURE — 2709999900 HC NON-CHARGEABLE SUPPLY: Performed by: STUDENT IN AN ORGANIZED HEALTH CARE EDUCATION/TRAINING PROGRAM

## 2024-03-11 PROCEDURE — 3700000001 HC ADD 15 MINUTES (ANESTHESIA): Performed by: STUDENT IN AN ORGANIZED HEALTH CARE EDUCATION/TRAINING PROGRAM

## 2024-03-11 PROCEDURE — 80053 COMPREHEN METABOLIC PANEL: CPT

## 2024-03-11 PROCEDURE — 96365 THER/PROPH/DIAG IV INF INIT: CPT

## 2024-03-11 PROCEDURE — 2500000003 HC RX 250 WO HCPCS: Performed by: STUDENT IN AN ORGANIZED HEALTH CARE EDUCATION/TRAINING PROGRAM

## 2024-03-11 PROCEDURE — 3600000017 HC SURGERY HYBRID ADDL 15MIN: Performed by: STUDENT IN AN ORGANIZED HEALTH CARE EDUCATION/TRAINING PROGRAM

## 2024-03-11 PROCEDURE — 3700000000 HC ANESTHESIA ATTENDED CARE: Performed by: STUDENT IN AN ORGANIZED HEALTH CARE EDUCATION/TRAINING PROGRAM

## 2024-03-11 PROCEDURE — 7100000001 HC PACU RECOVERY - ADDTL 15 MIN: Performed by: STUDENT IN AN ORGANIZED HEALTH CARE EDUCATION/TRAINING PROGRAM

## 2024-03-11 PROCEDURE — 96375 TX/PRO/DX INJ NEW DRUG ADDON: CPT

## 2024-03-11 PROCEDURE — 83690 ASSAY OF LIPASE: CPT

## 2024-03-11 PROCEDURE — 1200000000 HC SEMI PRIVATE

## 2024-03-11 PROCEDURE — 3600000007 HC SURGERY HYBRID BASE: Performed by: STUDENT IN AN ORGANIZED HEALTH CARE EDUCATION/TRAINING PROGRAM

## 2024-03-11 PROCEDURE — 99254 IP/OBS CNSLTJ NEW/EST MOD 60: CPT | Performed by: STUDENT IN AN ORGANIZED HEALTH CARE EDUCATION/TRAINING PROGRAM

## 2024-03-11 PROCEDURE — 87077 CULTURE AEROBIC IDENTIFY: CPT

## 2024-03-11 RX ORDER — AMLODIPINE BESYLATE 5 MG/1
5 TABLET ORAL DAILY
Status: DISCONTINUED | OUTPATIENT
Start: 2024-03-12 | End: 2024-03-12 | Stop reason: HOSPADM

## 2024-03-11 RX ORDER — 0.9 % SODIUM CHLORIDE 0.9 %
1000 INTRAVENOUS SOLUTION INTRAVENOUS ONCE
Status: COMPLETED | OUTPATIENT
Start: 2024-03-11 | End: 2024-03-11

## 2024-03-11 RX ORDER — ONDANSETRON 2 MG/ML
INJECTION INTRAMUSCULAR; INTRAVENOUS PRN
Status: DISCONTINUED | OUTPATIENT
Start: 2024-03-11 | End: 2024-03-12 | Stop reason: SDUPTHER

## 2024-03-11 RX ORDER — KETOROLAC TROMETHAMINE 30 MG/ML
15 INJECTION, SOLUTION INTRAMUSCULAR; INTRAVENOUS ONCE
Status: COMPLETED | OUTPATIENT
Start: 2024-03-11 | End: 2024-03-11

## 2024-03-11 RX ORDER — ATORVASTATIN CALCIUM 10 MG/1
10 TABLET, FILM COATED ORAL DAILY
Status: DISCONTINUED | OUTPATIENT
Start: 2024-03-11 | End: 2024-03-11

## 2024-03-11 RX ORDER — ONDANSETRON 2 MG/ML
4 INJECTION INTRAMUSCULAR; INTRAVENOUS EVERY 6 HOURS PRN
Status: DISCONTINUED | OUTPATIENT
Start: 2024-03-11 | End: 2024-03-12 | Stop reason: HOSPADM

## 2024-03-11 RX ORDER — OXYCODONE HYDROCHLORIDE 5 MG/1
5 TABLET ORAL EVERY 4 HOURS PRN
Status: DISCONTINUED | OUTPATIENT
Start: 2024-03-11 | End: 2024-03-12 | Stop reason: HOSPADM

## 2024-03-11 RX ORDER — PHENYLEPHRINE HCL IN 0.9% NACL 1 MG/10 ML
SYRINGE (ML) INTRAVENOUS PRN
Status: DISCONTINUED | OUTPATIENT
Start: 2024-03-11 | End: 2024-03-12 | Stop reason: SDUPTHER

## 2024-03-11 RX ORDER — MIDAZOLAM HYDROCHLORIDE 1 MG/ML
INJECTION INTRAMUSCULAR; INTRAVENOUS PRN
Status: DISCONTINUED | OUTPATIENT
Start: 2024-03-11 | End: 2024-03-12 | Stop reason: SDUPTHER

## 2024-03-11 RX ORDER — FENTANYL CITRATE 50 UG/ML
INJECTION, SOLUTION INTRAMUSCULAR; INTRAVENOUS PRN
Status: DISCONTINUED | OUTPATIENT
Start: 2024-03-11 | End: 2024-03-12 | Stop reason: SDUPTHER

## 2024-03-11 RX ORDER — ONDANSETRON 4 MG/1
4 TABLET, ORALLY DISINTEGRATING ORAL EVERY 8 HOURS PRN
Status: DISCONTINUED | OUTPATIENT
Start: 2024-03-11 | End: 2024-03-12 | Stop reason: HOSPADM

## 2024-03-11 RX ORDER — ROCURONIUM BROMIDE 10 MG/ML
INJECTION, SOLUTION INTRAVENOUS PRN
Status: DISCONTINUED | OUTPATIENT
Start: 2024-03-11 | End: 2024-03-12 | Stop reason: SDUPTHER

## 2024-03-11 RX ORDER — SODIUM CHLORIDE 0.9 % (FLUSH) 0.9 %
5-40 SYRINGE (ML) INJECTION EVERY 12 HOURS SCHEDULED
Status: DISCONTINUED | OUTPATIENT
Start: 2024-03-11 | End: 2024-03-12 | Stop reason: HOSPADM

## 2024-03-11 RX ORDER — ACETAMINOPHEN 325 MG/1
650 TABLET ORAL EVERY 4 HOURS PRN
Status: DISCONTINUED | OUTPATIENT
Start: 2024-03-11 | End: 2024-03-12

## 2024-03-11 RX ORDER — ONDANSETRON 2 MG/ML
4 INJECTION INTRAMUSCULAR; INTRAVENOUS ONCE
Status: COMPLETED | OUTPATIENT
Start: 2024-03-11 | End: 2024-03-11

## 2024-03-11 RX ORDER — ATORVASTATIN CALCIUM 10 MG/1
10 TABLET, FILM COATED ORAL DAILY
Status: DISCONTINUED | OUTPATIENT
Start: 2024-03-12 | End: 2024-03-12 | Stop reason: HOSPADM

## 2024-03-11 RX ORDER — ALBUTEROL SULFATE 90 UG/1
2 AEROSOL, METERED RESPIRATORY (INHALATION) EVERY 6 HOURS PRN
Status: DISCONTINUED | OUTPATIENT
Start: 2024-03-11 | End: 2024-03-11 | Stop reason: CLARIF

## 2024-03-11 RX ORDER — LIDOCAINE HYDROCHLORIDE 20 MG/ML
INJECTION, SOLUTION INTRAVENOUS PRN
Status: DISCONTINUED | OUTPATIENT
Start: 2024-03-11 | End: 2024-03-12 | Stop reason: SDUPTHER

## 2024-03-11 RX ORDER — DEXAMETHASONE SODIUM PHOSPHATE 10 MG/ML
INJECTION INTRAMUSCULAR; INTRAVENOUS PRN
Status: DISCONTINUED | OUTPATIENT
Start: 2024-03-11 | End: 2024-03-12 | Stop reason: SDUPTHER

## 2024-03-11 RX ORDER — SODIUM CHLORIDE, SODIUM LACTATE, POTASSIUM CHLORIDE, CALCIUM CHLORIDE 600; 310; 30; 20 MG/100ML; MG/100ML; MG/100ML; MG/100ML
INJECTION, SOLUTION INTRAVENOUS CONTINUOUS PRN
Status: DISCONTINUED | OUTPATIENT
Start: 2024-03-11 | End: 2024-03-12 | Stop reason: SDUPTHER

## 2024-03-11 RX ORDER — SODIUM CHLORIDE, SODIUM LACTATE, POTASSIUM CHLORIDE, CALCIUM CHLORIDE 600; 310; 30; 20 MG/100ML; MG/100ML; MG/100ML; MG/100ML
INJECTION, SOLUTION INTRAVENOUS CONTINUOUS
Status: DISCONTINUED | OUTPATIENT
Start: 2024-03-11 | End: 2024-03-12

## 2024-03-11 RX ORDER — PROPOFOL 10 MG/ML
INJECTION, EMULSION INTRAVENOUS PRN
Status: DISCONTINUED | OUTPATIENT
Start: 2024-03-11 | End: 2024-03-12 | Stop reason: SDUPTHER

## 2024-03-11 RX ORDER — ALBUTEROL SULFATE 2.5 MG/3ML
2.5 SOLUTION RESPIRATORY (INHALATION) EVERY 6 HOURS PRN
Status: DISCONTINUED | OUTPATIENT
Start: 2024-03-11 | End: 2024-03-11 | Stop reason: SDUPTHER

## 2024-03-11 RX ORDER — METRONIDAZOLE 500 MG/100ML
500 INJECTION, SOLUTION INTRAVENOUS ONCE
Status: COMPLETED | OUTPATIENT
Start: 2024-03-11 | End: 2024-03-11

## 2024-03-11 RX ORDER — OXYCODONE HYDROCHLORIDE 10 MG/1
10 TABLET ORAL EVERY 4 HOURS PRN
Status: DISCONTINUED | OUTPATIENT
Start: 2024-03-11 | End: 2024-03-12 | Stop reason: HOSPADM

## 2024-03-11 RX ORDER — IPRATROPIUM BROMIDE AND ALBUTEROL SULFATE 2.5; .5 MG/3ML; MG/3ML
1 SOLUTION RESPIRATORY (INHALATION) EVERY 6 HOURS PRN
Status: DISCONTINUED | OUTPATIENT
Start: 2024-03-11 | End: 2024-03-12 | Stop reason: HOSPADM

## 2024-03-11 RX ORDER — POLYETHYLENE GLYCOL 3350 17 G/17G
17 POWDER, FOR SOLUTION ORAL DAILY PRN
Status: DISCONTINUED | OUTPATIENT
Start: 2024-03-11 | End: 2024-03-12 | Stop reason: HOSPADM

## 2024-03-11 RX ORDER — SODIUM CHLORIDE 0.9 % (FLUSH) 0.9 %
5-40 SYRINGE (ML) INJECTION PRN
Status: DISCONTINUED | OUTPATIENT
Start: 2024-03-11 | End: 2024-03-12 | Stop reason: HOSPADM

## 2024-03-11 RX ORDER — PANTOPRAZOLE SODIUM 40 MG/1
40 TABLET, DELAYED RELEASE ORAL
Status: DISCONTINUED | OUTPATIENT
Start: 2024-03-12 | End: 2024-03-12 | Stop reason: HOSPADM

## 2024-03-11 RX ORDER — SERTRALINE HYDROCHLORIDE 25 MG/1
50 TABLET, FILM COATED ORAL DAILY
Status: DISCONTINUED | OUTPATIENT
Start: 2024-03-11 | End: 2024-03-11

## 2024-03-11 RX ORDER — SODIUM CHLORIDE 9 MG/ML
INJECTION, SOLUTION INTRAVENOUS PRN
Status: DISCONTINUED | OUTPATIENT
Start: 2024-03-11 | End: 2024-03-12 | Stop reason: HOSPADM

## 2024-03-11 RX ORDER — TIOTROPIUM BROMIDE 18 UG/1
18 CAPSULE ORAL; RESPIRATORY (INHALATION) DAILY
Status: DISCONTINUED | OUTPATIENT
Start: 2024-03-11 | End: 2024-03-11 | Stop reason: CLARIF

## 2024-03-11 RX ORDER — METRONIDAZOLE 500 MG/100ML
500 INJECTION, SOLUTION INTRAVENOUS EVERY 8 HOURS
Status: DISCONTINUED | OUTPATIENT
Start: 2024-03-12 | End: 2024-03-12 | Stop reason: HOSPADM

## 2024-03-11 RX ADMIN — METRONIDAZOLE 500 MG: 500 INJECTION, SOLUTION INTRAVENOUS at 16:38

## 2024-03-11 RX ADMIN — SODIUM CHLORIDE, POTASSIUM CHLORIDE, SODIUM LACTATE AND CALCIUM CHLORIDE: 600; 310; 30; 20 INJECTION, SOLUTION INTRAVENOUS at 17:41

## 2024-03-11 RX ADMIN — ONDANSETRON 4 MG: 2 INJECTION INTRAMUSCULAR; INTRAVENOUS at 11:52

## 2024-03-11 RX ADMIN — CEFTRIAXONE SODIUM 2000 MG: 2 INJECTION, POWDER, FOR SOLUTION INTRAMUSCULAR; INTRAVENOUS at 16:19

## 2024-03-11 RX ADMIN — ONDANSETRON 4 MG: 2 INJECTION INTRAMUSCULAR; INTRAVENOUS at 23:53

## 2024-03-11 RX ADMIN — FENTANYL CITRATE 50 MCG: 0.05 INJECTION, SOLUTION INTRAMUSCULAR; INTRAVENOUS at 22:42

## 2024-03-11 RX ADMIN — LIDOCAINE HYDROCHLORIDE 100 MG: 20 INJECTION, SOLUTION INTRAVENOUS at 22:45

## 2024-03-11 RX ADMIN — DEXAMETHASONE SODIUM PHOSPHATE 10 MG: 10 INJECTION INTRAMUSCULAR; INTRAVENOUS at 22:54

## 2024-03-11 RX ADMIN — IPRATROPIUM BROMIDE 0.5 MG: 0.5 SOLUTION RESPIRATORY (INHALATION) at 19:56

## 2024-03-11 RX ADMIN — IOPAMIDOL 75 ML: 755 INJECTION, SOLUTION INTRAVENOUS at 13:48

## 2024-03-11 RX ADMIN — FENTANYL CITRATE 50 MCG: 0.05 INJECTION, SOLUTION INTRAMUSCULAR; INTRAVENOUS at 23:07

## 2024-03-11 RX ADMIN — KETOROLAC TROMETHAMINE 15 MG: 30 INJECTION, SOLUTION INTRAMUSCULAR at 11:52

## 2024-03-11 RX ADMIN — SODIUM CHLORIDE, POTASSIUM CHLORIDE, SODIUM LACTATE AND CALCIUM CHLORIDE: 600; 310; 30; 20 INJECTION, SOLUTION INTRAVENOUS at 22:04

## 2024-03-11 RX ADMIN — SUGAMMADEX 200 MG: 100 INJECTION, SOLUTION INTRAVENOUS at 23:57

## 2024-03-11 RX ADMIN — FENTANYL CITRATE 50 MCG: 0.05 INJECTION, SOLUTION INTRAMUSCULAR; INTRAVENOUS at 23:40

## 2024-03-11 RX ADMIN — SODIUM CHLORIDE, POTASSIUM CHLORIDE, SODIUM LACTATE AND CALCIUM CHLORIDE: 600; 310; 30; 20 INJECTION, SOLUTION INTRAVENOUS at 22:40

## 2024-03-11 RX ADMIN — SODIUM CHLORIDE 1000 ML: 9 INJECTION, SOLUTION INTRAVENOUS at 11:52

## 2024-03-11 RX ADMIN — Medication 100 MCG: at 22:54

## 2024-03-11 RX ADMIN — PROPOFOL 150 MG: 10 INJECTION, EMULSION INTRAVENOUS at 22:45

## 2024-03-11 RX ADMIN — FENTANYL CITRATE 50 MCG: 0.05 INJECTION, SOLUTION INTRAMUSCULAR; INTRAVENOUS at 22:45

## 2024-03-11 RX ADMIN — HYDROMORPHONE HYDROCHLORIDE 0.5 MG: 1 INJECTION, SOLUTION INTRAMUSCULAR; INTRAVENOUS; SUBCUTANEOUS at 16:37

## 2024-03-11 RX ADMIN — ROCURONIUM BROMIDE 60 MG: 10 INJECTION, SOLUTION INTRAVENOUS at 22:46

## 2024-03-11 RX ADMIN — MIDAZOLAM 2 MG: 1 INJECTION INTRAMUSCULAR; INTRAVENOUS at 22:42

## 2024-03-11 ASSESSMENT — ENCOUNTER SYMPTOMS
DIARRHEA: 0
ABDOMINAL PAIN: 1
SHORTNESS OF BREATH: 0
PHOTOPHOBIA: 0
BLOOD IN STOOL: 0
WHEEZING: 0
COUGH: 0
ABDOMINAL DISTENTION: 0
SORE THROAT: 0
BACK PAIN: 0
NAUSEA: 1
CONSTIPATION: 0
CHEST TIGHTNESS: 0
RHINORRHEA: 0
VOMITING: 0

## 2024-03-11 ASSESSMENT — PAIN DESCRIPTION - DESCRIPTORS: DESCRIPTORS: SHOOTING;STABBING;DISCOMFORT

## 2024-03-11 ASSESSMENT — PAIN DESCRIPTION - ORIENTATION: ORIENTATION: RIGHT

## 2024-03-11 ASSESSMENT — LIFESTYLE VARIABLES
HOW OFTEN DO YOU HAVE A DRINK CONTAINING ALCOHOL: NEVER
HOW MANY STANDARD DRINKS CONTAINING ALCOHOL DO YOU HAVE ON A TYPICAL DAY: PATIENT DOES NOT DRINK

## 2024-03-11 ASSESSMENT — PAIN SCALES - GENERAL
PAINLEVEL_OUTOF10: 5
PAINLEVEL_OUTOF10: 5

## 2024-03-11 ASSESSMENT — PAIN DESCRIPTION - LOCATION: LOCATION: BACK

## 2024-03-11 ASSESSMENT — COPD QUESTIONNAIRES: CAT_SEVERITY: MILD

## 2024-03-11 NOTE — H&P
PROVIDED HISTORY: llq abdominal pain, wraps around TECHNOLOGIST PROVIDED HISTORY: Reason for exam:->llq abdominal pain, wraps around Additional Contrast?->None Decision Support Exception - unselect if not a suspected or confirmed emergency medical condition->Emergency Medical Condition (MA) What reading provider will be dictating this exam?->CRC FINDINGS: Calcified granulomata are noted at both lung bases.  There is no evidence of pleural effusion.  Nodular scarring is present at the posterolateral right lung base. There is diffuse low-attenuation of the hepatic parenchyma consistent with fatty infiltration.  No focal hepatic lesion is seen.  Gallstones are present with gallbladder wall thickening and pericholecystic fat stranding.  The spleen, pancreas, adrenals, abdominal aorta have an unremarkable appearance. Multiple left renal calculi are present, the largest measuring 8 mm in the mid inferior left kidney.  8 mm calculus is present within the medial upper pole of the left kidney with several additional smaller left renal calculi also present there.  There is no evidence of hydronephrosis, hydroureter or ureterolithiasis.  Low-attenuation 9 mm lesion in the posterior midpole of the right kidney likely represents a cyst.  This is too small to characterize.  No right renal calculi or right-sided hydronephrosis or hydroureter are identified. Uterus and adnexa are unremarkable.  There is a small amount of fluid within the pelvis.  There is transverse, descending and sigmoid colonic diverticulosis, without evidence of diverticulitis.  The vermiform appendix is not dilated.  There is no evidence of free intraperitoneal air free no enlarged abdominal or pelvic lymph nodes are seen.  Bone windows reveal no evidence of acute fracture or osteolytic/osteoblastic lesion.     Cholelithiasis with gallbladder wall thickening and pericholecystic fat stranding are consistent with acute cholecystitis.  There is a small amount of  pelvic ascites. Colonic diverticulosis, without evidence of diverticulitis. Left nephrolithiasis, without evidence of acute urinary obstruction. Hepatic steatosis.       Resident's Assessment and Plan     Delta Gomez is a 58 y.o. female    Principal Problem:    Acute cholecystitis  Resolved Problems:    * No resolved hospital problems. *      Acute cholecystitis  Continue ceftriaxone IV 2000 mg daily and metronidazole  mg 3 times daily  N.p.o.  Maintenance IV fluids lactated Ringer at 100 mL/h  Pain regimen.  Tylenol as needed for mild pain.  Oxycodone and/or Dilaudid as needed for moderate to severe pain.  General surgery on board.  Plan for laparoscopic cholecystectomy tonight versus tomorrow morning.  Asymptomatic bacteriuria  Urinalysis remarkable for small leukocyte esterase, pyuria, bacteria 1+.  Urine culture collected in the emergency department.    Elevated alkaline phosphatase  GGT and alkaline phosphatase fractionated      DVT prophylaxis: PCD's  GI prophylaxis: Protonix 40 mg daily  Diet: N.p.o.  Full code      Jabari Sewell MD  Attending physician: Dr. Quintero

## 2024-03-11 NOTE — CONSULTS
[facility]    General Surgery Attending Consult Note     Patient's Name/Date of Birth: Delta Gomez / 1965 (58 y.o.)    Date: March 11, 2024     CC:RUQ abdominal pain     HPI:  59yo woman with COPD, HTN, HLD, and asthma presented to the ED with RUQ abdominal pain which began at approx 3am yesterday moring.  She states the pain never resolved and progressivly got worse prompting her to seek care in the ER.  CT scan of the abdomen and pelvis demonstrate an impacted gallstone at the infundibulum with gallbladder distention and pericholcystic fluid.  General Surgery was consulted for surgical intervention.    Pt states she ate crackers at 2pm today.      The patient reported  acute, constant sharp pain localized to the RUQ. The intensity of the pain is 10/10.  Alleviating factors include Rest and pain medications. Worsening factors  include PO intake and abdominal palpation.      Past Medical History:   Diagnosis Date    Adhesive capsulitis of right shoulder     s/p rotator cuff injury and repair; follows with Dr. Dhillon at [facility].      Alcoholism ([facility])     Quit 24 years ago.      Allergic rhinosinusitis     Asthma     Rare use of albuterol.    Bilateral carpal tunnel syndrome 03/26/2018    left OR 10-2-19     COPD (chronic obstructive pulmonary disease) ([facility])     no oxygen, controlled with inhalers     Hidradenitis suppurativa 4/17/2012    Hyperlipidemia     on statin    Intertrigo 4/17/2012    Moderate single current episode of major depressive disorder ([facility]) 3/26/2018    Obesity     Onychomycosis 4/17/2012    Tobacco abuse        Past Surgical History:   Procedure Laterality Date    ANKLE SURGERY      left     CARPAL TUNNEL RELEASE Left 10/2/2019    LEFT ENDOSCOPIC CARPAL TUNNEL RELEASE, LEFT THUMB TRIGGER RELEASE performed by Mickey Goodrich MD at [facility] OR    COLONOSCOPY  08/02/2018    Diverticuli.  Poor prep, consider repeat 5-10 years.      FRACTURE SURGERY  1994    Left Tibia    FRACTURE  03/11/2024 11:37 AM    HCT 47.3 03/11/2024 11:37 AM    MCV 95.6 03/11/2024 11:37 AM    MCH 32.1 03/11/2024 11:37 AM    MCHC 33.6 03/11/2024 11:37 AM    RDW 12.9 03/11/2024 11:37 AM     03/11/2024 11:37 AM    MPV 9.0 03/11/2024 11:37 AM     CMP:    Lab Results   Component Value Date/Time     03/11/2024 11:37 AM    K 3.9 03/11/2024 11:37 AM    CL 99 03/11/2024 11:37 AM    CO2 29 03/11/2024 11:37 AM    BUN 8 03/11/2024 11:37 AM    CREATININE 0.7 03/11/2024 11:37 AM    GFRAA >60 09/30/2021 12:00 PM    LABGLOM >60 03/11/2024 11:37 AM    GLUCOSE 122 03/11/2024 11:37 AM    GLUCOSE 94 05/01/2012 08:10 AM    PROT 7.8 03/11/2024 11:37 AM    LABALBU 4.2 03/11/2024 11:37 AM    LABALBU 3.9 05/01/2012 08:10 AM    CALCIUM 9.3 03/11/2024 11:37 AM    BILITOT 0.9 03/11/2024 11:37 AM    ALKPHOS 127 03/11/2024 11:37 AM    AST 14 03/11/2024 11:37 AM    ALT 20 03/11/2024 11:37 AM     PT/INR:    Lab Results   Component Value Date/Time    PROTIME 11.7 05/17/2018 09:15 AM    INR 1.0 05/17/2018 09:15 AM     PTT:  No results found for: \"APTT\", \"PTT\"[APTT}  LIPASE:    Lab Results   Component Value Date/Time    LIPASE 13 03/11/2024 11:37 AM        I have personally reviewed the CAT Scan imaging and my interpretation is Acute cholecystitis with impacted gallstone.     Jay Guillory MD   3/11/2024  5:16 PM     NOTE: This report was transcribed using voice recognition software. Every effort was made to ensure accuracy; however, inadvertent computerized transcription errors may be present.

## 2024-03-11 NOTE — ED PROVIDER NOTES
High Cholesterol Mother     Arthritis Mother     Heart Attack Mother 42    Cancer Father 56        Throat CA    Substance Abuse Father         Alcohol, throat CA    High Blood Pressure Father     High Cholesterol Father     Esophageal Cancer Father     Diabetes Maternal Aunt         SOCIAL HISTORY       Social History     Tobacco Use    Smoking status: Former     Current packs/day: 0.00     Average packs/day: 1.4 packs/day for 60.0 years (84.0 ttl pk-yrs)     Types: Cigarettes     Start date: 1982     Quit date: 2018     Years since quittin.8     Passive exposure: Past    Smokeless tobacco: Never   Vaping Use    Vaping Use: Never used   Substance Use Topics    Alcohol use: No     Comment: Previous alcoholism, 24 years clean.      Drug use: No       SCREENINGS        [place] Coma Scale  Eye Opening: Spontaneous  Best Verbal Response: Oriented  Best Motor Response: Obeys commands  [place] Coma Scale Score: 15                CIWA Assessment  BP: 118/83  Pulse: 93           PHYSICAL EXAM  1 or more Elements     ED Triage Vitals   BP Temp Temp Source Pulse Respirations SpO2 Height Weight - Scale   24 0946 24 0930 24 0930 24 0930 24 0946 24 0930 24 0946 24 0946   118/83 97.3 °F (36.3 °C) Temporal 93 16 97 % 1.651 m (5' 5\") 97.1 kg (214 lb)         Physical Exam  Vitals and nursing note reviewed.   Constitutional:       General: She is not in acute distress.     Appearance: She is not ill-appearing or toxic-appearing.   HENT:      Head: Normocephalic and atraumatic.      Right Ear: External ear normal.      Left Ear: External ear normal.      Nose: Nose normal. No congestion or rhinorrhea.   Eyes:      General:         Right eye: No discharge.         Left eye: No discharge.      Pupils: Pupils are equal, round, and reactive to light.   Cardiovascular:      Rate and Rhythm: Normal rate and regular rhythm.      Pulses: Normal pulses.      Heart sounds: No  Index  [JH] Bandar Manuel DO            Chronic Conditions:   Past Medical History:   Diagnosis Date    Adhesive capsulitis of right shoulder     s/p rotator cuff injury and repair; follows with Dr. Dhillon at [facility].      Alcoholism ([facility])     Quit 24 years ago.      Allergic rhinosinusitis     Asthma     Rare use of albuterol.    Bilateral carpal tunnel syndrome 03/26/2018    left OR 10-2-19     COPD (chronic obstructive pulmonary disease) ([facility])     no oxygen, controlled with inhalers     Hidradenitis suppurativa 4/17/2012    Hyperlipidemia     on statin    Intertrigo 4/17/2012    Moderate single current episode of major depressive disorder ([facility]) 3/26/2018    Obesity     Onychomycosis 4/17/2012    Tobacco abuse          Records Reviewed: Note from 3/1/2024 for patient's past medical history.  Patient's medications were obtained.    CONSULTS: (Who and What was discussed)  IP CONSULT TO GENERAL SURGERY      FINAL IMPRESSION      1. Acute cholecystitis          DISPOSITION/PLAN     DISPOSITION Admitted 03/11/2024 06:09:24 PM      PATIENT REFERRED TO:  No follow-up provider specified.    DISCHARGE MEDICATIONS:  New Prescriptions    No medications on file            (Please note that portions of this note were completed with a voice recognition program.  Efforts were made to edit the dictations but occasionally words are mis-transcribed.)    Bandar Manuel DO (electronically signed)

## 2024-03-11 NOTE — H&P
GENERAL SURGERY  HISTORY AND PHYSICAL  3/11/2024    Chief Complaint   Patient presents with    Flank Pain     C/O right flank pain and lower back pain since 0330 Sunday.        HPI  Delta Gomez is a 58 y.o. female with history of COPD, hypertension, hyperlipidemia, depression and obesity who presents for evaluation of acute onset abdominal and back pain to the right flank and epigastrium.  Patient reports the pain woke her up yesterday morning and has been persistent since then.  Reports she has not eaten anything since Saturday.  Reports she is extremely nauseated but has not vomited.  Denies ever having pain like this in the past.  No diarrhea, melena, hematochezia, hematemesis, fever or chills.  Only prior abdominal surgical history is umbilical hernia repair.  No blood thinning medications.     CT abdomen/pelvis reviewed and significant for pericholecystic inflammatory changes and gallbladder wall thickening concerning for acute cholecystitis.  Large gallstone present.  No evidence of biliary dilatation.  Labs reviewed, WBC 20.4, hemoglobin 15.9, , normal LFTs and T. bili and lipase.  Lactic acid 1.1.  Patient hemodynamically stable, afebrile since arrival.      Past Medical History:   Diagnosis Date    Adhesive capsulitis of right shoulder     s/p rotator cuff injury and repair; follows with Dr. Dhillon at [facility].      Alcoholism ([facility])     Quit 24 years ago.      Allergic rhinosinusitis     Asthma     Rare use of albuterol.    Bilateral carpal tunnel syndrome 03/26/2018    left OR 10-2-19     COPD (chronic obstructive pulmonary disease) ([facility])     no oxygen, controlled with inhalers     Hidradenitis suppurativa 4/17/2012    Hyperlipidemia     on statin    Intertrigo 4/17/2012    Moderate single current episode of major depressive disorder ([facility]) 3/26/2018    Obesity     Onychomycosis 4/17/2012    Tobacco abuse        Past Surgical History:   Procedure Laterality Date    ANKLE SURGERY      left     CARPAL  lungs every 6 hours as needed for Shortness of Breath 3/18/19   Isael, Carolina, DO       Allergies   Allergen Reactions    Bee Venom Anaphylaxis    Pcn [Penicillins] Nausea And Vomiting       Family History   Problem Relation Age of Onset    Cancer Mother 43        CA in shoulder, lungs    High Blood Pressure Mother     Heart Disease Mother     Mental Illness Mother     High Cholesterol Mother     Arthritis Mother     Heart Attack Mother 42    Cancer Father 56        Throat CA    Substance Abuse Father         Alcohol, throat CA    High Blood Pressure Father     High Cholesterol Father     Esophageal Cancer Father     Diabetes Maternal Aunt        Social History     Tobacco Use    Smoking status: Former     Current packs/day: 0.00     Average packs/day: 1.4 packs/day for 60.0 years (84.0 ttl pk-yrs)     Types: Cigarettes     Start date: 1982     Quit date: 2018     Years since quittin.8     Passive exposure: Past    Smokeless tobacco: Never   Vaping Use    Vaping Use: Never used   Substance Use Topics    Alcohol use: No     Comment: Previous alcoholism, 24 years clean.      Drug use: No         Review of Systems: pertinent ROS listed in HPI, all others negative       PHYSICAL EXAM:    Vitals:    24 0946   BP: 118/83   Pulse:    Resp: 16   Temp:    SpO2:        GENERAL:   A&Ox3.  Uncomfortable appearing  HEAD:  Normocephalic, atraumatic.   EYES:   No scleral icterus. PERRLA.  LUNGS:  No increased work of breathing.  CARDIOVASCULAR: RR  ABDOMEN:  Soft, non-distended, tender to the epigastrium and right upper quadrant, positive Corona sign no guarding, rigidity, rebound.  Obese      LABS:  CBC  Recent Labs     24  1137   WBC 20.4*   HGB 15.9*   HCT 47.3        BMP  Recent Labs     24  1137      K 3.9   CL 99   CO2 29   BUN 8   CREATININE 0.7   CALCIUM 9.3     Liver Function  Recent Labs     24  1137   LIPASE 13   BILITOT 0.9   AST 14   ALT 20   ALKPHOS 127*   PROT 7.8

## 2024-03-12 VITALS
OXYGEN SATURATION: 92 % | RESPIRATION RATE: 16 BRPM | TEMPERATURE: 96.8 F | DIASTOLIC BLOOD PRESSURE: 65 MMHG | BODY MASS INDEX: 35.65 KG/M2 | WEIGHT: 214 LBS | SYSTOLIC BLOOD PRESSURE: 101 MMHG | HEART RATE: 74 BPM | HEIGHT: 65 IN

## 2024-03-12 LAB
ALBUMIN SERPL-MCNC: 3.6 G/DL (ref 3.5–5.2)
ALP SERPL-CCNC: 106 U/L (ref 35–104)
ALT SERPL-CCNC: 40 U/L (ref 0–32)
ANION GAP SERPL CALCULATED.3IONS-SCNC: 13 MMOL/L (ref 7–16)
AST SERPL-CCNC: 34 U/L (ref 0–31)
BASOPHILS # BLD: 0.03 K/UL (ref 0–0.2)
BASOPHILS NFR BLD: 0 % (ref 0–2)
BILIRUB SERPL-MCNC: 0.5 MG/DL (ref 0–1.2)
BUN SERPL-MCNC: 10 MG/DL (ref 6–20)
CALCIUM SERPL-MCNC: 8.9 MG/DL (ref 8.6–10.2)
CHLORIDE SERPL-SCNC: 106 MMOL/L (ref 98–107)
CO2 SERPL-SCNC: 23 MMOL/L (ref 22–29)
CREAT SERPL-MCNC: 0.8 MG/DL (ref 0.5–1)
EOSINOPHIL # BLD: 0 K/UL (ref 0.05–0.5)
EOSINOPHILS RELATIVE PERCENT: 0 % (ref 0–6)
ERYTHROCYTE [DISTWIDTH] IN BLOOD BY AUTOMATED COUNT: 13 % (ref 11.5–15)
GFR SERPL CREATININE-BSD FRML MDRD: >60 ML/MIN/1.73M2
GGT SERPL-CCNC: 24 U/L (ref 6–42)
GLUCOSE SERPL-MCNC: 179 MG/DL (ref 74–99)
HCT VFR BLD AUTO: 43.8 % (ref 34–48)
HGB BLD-MCNC: 14.4 G/DL (ref 11.5–15.5)
IMM GRANULOCYTES # BLD AUTO: 0.1 K/UL (ref 0–0.58)
IMM GRANULOCYTES NFR BLD: 1 % (ref 0–5)
LYMPHOCYTES NFR BLD: 0.66 K/UL (ref 1.5–4)
LYMPHOCYTES RELATIVE PERCENT: 4 % (ref 20–42)
MCH RBC QN AUTO: 32.4 PG (ref 26–35)
MCHC RBC AUTO-ENTMCNC: 32.9 G/DL (ref 32–34.5)
MCV RBC AUTO: 98.4 FL (ref 80–99.9)
MONOCYTES NFR BLD: 0.38 K/UL (ref 0.1–0.95)
MONOCYTES NFR BLD: 2 % (ref 2–12)
NEUTROPHILS NFR BLD: 93 % (ref 43–80)
NEUTS SEG NFR BLD: 16.11 K/UL (ref 1.8–7.3)
PLATELET # BLD AUTO: 296 K/UL (ref 130–450)
PMV BLD AUTO: 9.2 FL (ref 7–12)
POTASSIUM SERPL-SCNC: 3.8 MMOL/L (ref 3.5–5)
PROT SERPL-MCNC: 7.2 G/DL (ref 6.4–8.3)
RBC # BLD AUTO: 4.45 M/UL (ref 3.5–5.5)
SODIUM SERPL-SCNC: 142 MMOL/L (ref 132–146)
WBC OTHER # BLD: 17.3 K/UL (ref 4.5–11.5)

## 2024-03-12 PROCEDURE — 99024 POSTOP FOLLOW-UP VISIT: CPT | Performed by: SURGERY

## 2024-03-12 PROCEDURE — 6360000002 HC RX W HCPCS

## 2024-03-12 PROCEDURE — 6370000000 HC RX 637 (ALT 250 FOR IP): Performed by: SURGERY

## 2024-03-12 PROCEDURE — 85025 COMPLETE CBC W/AUTO DIFF WBC: CPT

## 2024-03-12 PROCEDURE — 84080 ASSAY ALKALINE PHOSPHATASES: CPT

## 2024-03-12 PROCEDURE — 47562 LAPAROSCOPIC CHOLECYSTECTOMY: CPT | Performed by: STUDENT IN AN ORGANIZED HEALTH CARE EDUCATION/TRAINING PROGRAM

## 2024-03-12 PROCEDURE — 99235 HOSP IP/OBS SAME DATE MOD 70: CPT | Performed by: FAMILY MEDICINE

## 2024-03-12 PROCEDURE — 84075 ASSAY ALKALINE PHOSPHATASE: CPT

## 2024-03-12 PROCEDURE — 6360000002 HC RX W HCPCS: Performed by: ANESTHESIOLOGIST ASSISTANT

## 2024-03-12 PROCEDURE — 6360000002 HC RX W HCPCS: Performed by: SURGERY

## 2024-03-12 PROCEDURE — 80053 COMPREHEN METABOLIC PANEL: CPT

## 2024-03-12 PROCEDURE — 6370000000 HC RX 637 (ALT 250 FOR IP)

## 2024-03-12 PROCEDURE — 82977 ASSAY OF GGT: CPT

## 2024-03-12 PROCEDURE — 36415 COLL VENOUS BLD VENIPUNCTURE: CPT

## 2024-03-12 PROCEDURE — 2580000003 HC RX 258: Performed by: SURGERY

## 2024-03-12 PROCEDURE — 2700000000 HC OXYGEN THERAPY PER DAY

## 2024-03-12 RX ORDER — SODIUM CHLORIDE 9 MG/ML
INJECTION, SOLUTION INTRAVENOUS PRN
Status: DISCONTINUED | OUTPATIENT
Start: 2024-03-12 | End: 2024-03-12 | Stop reason: HOSPADM

## 2024-03-12 RX ORDER — DIPHENHYDRAMINE HYDROCHLORIDE 50 MG/ML
12.5 INJECTION INTRAMUSCULAR; INTRAVENOUS
Status: DISCONTINUED | OUTPATIENT
Start: 2024-03-12 | End: 2024-03-12 | Stop reason: HOSPADM

## 2024-03-12 RX ORDER — HYDROMORPHONE HYDROCHLORIDE 1 MG/ML
0.25 INJECTION, SOLUTION INTRAMUSCULAR; INTRAVENOUS; SUBCUTANEOUS EVERY 5 MIN PRN
Status: DISCONTINUED | OUTPATIENT
Start: 2024-03-12 | End: 2024-03-12 | Stop reason: HOSPADM

## 2024-03-12 RX ORDER — ACETAMINOPHEN 325 MG/1
650 TABLET ORAL EVERY 6 HOURS SCHEDULED
Status: DISCONTINUED | OUTPATIENT
Start: 2024-03-12 | End: 2024-03-12 | Stop reason: HOSPADM

## 2024-03-12 RX ORDER — MEPERIDINE HYDROCHLORIDE 25 MG/ML
12.5 INJECTION INTRAMUSCULAR; INTRAVENOUS; SUBCUTANEOUS EVERY 5 MIN PRN
Status: DISCONTINUED | OUTPATIENT
Start: 2024-03-12 | End: 2024-03-12 | Stop reason: HOSPADM

## 2024-03-12 RX ORDER — DROPERIDOL 2.5 MG/ML
0.62 INJECTION, SOLUTION INTRAMUSCULAR; INTRAVENOUS
Status: DISCONTINUED | OUTPATIENT
Start: 2024-03-12 | End: 2024-03-12 | Stop reason: HOSPADM

## 2024-03-12 RX ORDER — ACETAMINOPHEN 325 MG/1
650 TABLET ORAL
Status: DISCONTINUED | OUTPATIENT
Start: 2024-03-12 | End: 2024-03-12 | Stop reason: HOSPADM

## 2024-03-12 RX ORDER — MIDAZOLAM HYDROCHLORIDE 2 MG/2ML
2 INJECTION, SOLUTION INTRAMUSCULAR; INTRAVENOUS
Status: DISCONTINUED | OUTPATIENT
Start: 2024-03-12 | End: 2024-03-12 | Stop reason: HOSPADM

## 2024-03-12 RX ORDER — SODIUM CHLORIDE 0.9 % (FLUSH) 0.9 %
5-40 SYRINGE (ML) INJECTION EVERY 12 HOURS SCHEDULED
Status: DISCONTINUED | OUTPATIENT
Start: 2024-03-12 | End: 2024-03-12 | Stop reason: HOSPADM

## 2024-03-12 RX ORDER — OXYCODONE HYDROCHLORIDE 5 MG/1
5 TABLET ORAL EVERY 6 HOURS PRN
Qty: 20 TABLET | Refills: 0 | Status: SHIPPED | OUTPATIENT
Start: 2024-03-12 | End: 2024-03-17

## 2024-03-12 RX ORDER — HYDRALAZINE HYDROCHLORIDE 20 MG/ML
5 INJECTION INTRAMUSCULAR; INTRAVENOUS
Status: DISCONTINUED | OUTPATIENT
Start: 2024-03-12 | End: 2024-03-12 | Stop reason: HOSPADM

## 2024-03-12 RX ORDER — IPRATROPIUM BROMIDE AND ALBUTEROL SULFATE 2.5; .5 MG/3ML; MG/3ML
1 SOLUTION RESPIRATORY (INHALATION)
Status: DISCONTINUED | OUTPATIENT
Start: 2024-03-12 | End: 2024-03-12 | Stop reason: HOSPADM

## 2024-03-12 RX ORDER — SODIUM CHLORIDE 0.9 % (FLUSH) 0.9 %
5-40 SYRINGE (ML) INJECTION PRN
Status: DISCONTINUED | OUTPATIENT
Start: 2024-03-12 | End: 2024-03-12 | Stop reason: HOSPADM

## 2024-03-12 RX ORDER — NALOXONE HYDROCHLORIDE 0.4 MG/ML
INJECTION, SOLUTION INTRAMUSCULAR; INTRAVENOUS; SUBCUTANEOUS PRN
Status: DISCONTINUED | OUTPATIENT
Start: 2024-03-12 | End: 2024-03-12 | Stop reason: HOSPADM

## 2024-03-12 RX ORDER — HYDROMORPHONE HYDROCHLORIDE 1 MG/ML
0.5 INJECTION, SOLUTION INTRAMUSCULAR; INTRAVENOUS; SUBCUTANEOUS EVERY 5 MIN PRN
Status: DISCONTINUED | OUTPATIENT
Start: 2024-03-12 | End: 2024-03-12 | Stop reason: HOSPADM

## 2024-03-12 RX ORDER — ONDANSETRON 2 MG/ML
4 INJECTION INTRAMUSCULAR; INTRAVENOUS
Status: DISCONTINUED | OUTPATIENT
Start: 2024-03-12 | End: 2024-03-12 | Stop reason: HOSPADM

## 2024-03-12 RX ORDER — LABETALOL HYDROCHLORIDE 5 MG/ML
5 INJECTION, SOLUTION INTRAVENOUS
Status: DISCONTINUED | OUTPATIENT
Start: 2024-03-12 | End: 2024-03-12 | Stop reason: HOSPADM

## 2024-03-12 RX ADMIN — OXYCODONE HYDROCHLORIDE 10 MG: 10 TABLET ORAL at 03:31

## 2024-03-12 RX ADMIN — SODIUM CHLORIDE, PRESERVATIVE FREE 10 ML: 5 INJECTION INTRAVENOUS at 07:51

## 2024-03-12 RX ADMIN — METRONIDAZOLE 500 MG: 500 INJECTION, SOLUTION INTRAVENOUS at 07:53

## 2024-03-12 RX ADMIN — METRONIDAZOLE 500 MG: 500 INJECTION, SOLUTION INTRAVENOUS at 02:19

## 2024-03-12 RX ADMIN — FENTANYL CITRATE 50 MCG: 0.05 INJECTION, SOLUTION INTRAMUSCULAR; INTRAVENOUS at 00:11

## 2024-03-12 RX ADMIN — PANTOPRAZOLE SODIUM 40 MG: 40 TABLET, DELAYED RELEASE ORAL at 05:46

## 2024-03-12 RX ADMIN — ACETAMINOPHEN 650 MG: 325 TABLET ORAL at 12:06

## 2024-03-12 RX ADMIN — SERTRALINE 50 MG: 50 TABLET, FILM COATED ORAL at 07:51

## 2024-03-12 RX ADMIN — AMLODIPINE BESYLATE 5 MG: 5 TABLET ORAL at 07:51

## 2024-03-12 RX ADMIN — ATORVASTATIN CALCIUM 10 MG: 10 TABLET, FILM COATED ORAL at 07:51

## 2024-03-12 RX ADMIN — CEFTRIAXONE SODIUM 2000 MG: 2 INJECTION, POWDER, FOR SOLUTION INTRAMUSCULAR; INTRAVENOUS at 13:24

## 2024-03-12 RX ADMIN — ACETAMINOPHEN 650 MG: 325 TABLET ORAL at 05:46

## 2024-03-12 ASSESSMENT — PAIN DESCRIPTION - LOCATION: LOCATION: ABDOMEN

## 2024-03-12 ASSESSMENT — PAIN DESCRIPTION - ORIENTATION: ORIENTATION: RIGHT

## 2024-03-12 ASSESSMENT — PAIN SCALES - GENERAL
PAINLEVEL_OUTOF10: 3
PAINLEVEL_OUTOF10: 9

## 2024-03-12 ASSESSMENT — PAIN DESCRIPTION - DESCRIPTORS: DESCRIPTORS: ACHING;DISCOMFORT;NAGGING

## 2024-03-12 ASSESSMENT — PAIN - FUNCTIONAL ASSESSMENT: PAIN_FUNCTIONAL_ASSESSMENT: PREVENTS OR INTERFERES SOME ACTIVE ACTIVITIES AND ADLS

## 2024-03-12 NOTE — CARE COORDINATION
3/12/2024Social work transition of care planning  Pt is from home alone. Pt reported that she uses a cane, prn. Pt did not report any hx of snf or hhc. Pt pcp is Dr. Kirkland and pharmacy is [facility]. Explained sw role in transition of care planning. Pt plan is home,pt will call for a ride at [place].   Electronically signed by SYLVAIN Sánchez on 3/12/2024 at 9:48 AM

## 2024-03-12 NOTE — ANESTHESIA POSTPROCEDURE EVALUATION
Department of Anesthesiology  Postprocedure Note    Patient: Delta Gomez  MRN: [id number]  YOB: 1965  Date of evaluation: 3/12/2024    Procedure Summary       Date: 03/11/24 Room / Location: [address]    Anesthesia Start: 2240 Anesthesia Stop: 03/12/24 0020    Procedure: LAPAROSCOPIC CHOLECYSTECTOMY (Abdomen) Diagnosis:       Acute cholecystitis      (Acute cholecystitis [K81.0])    Surgeons: Jay Guillory MD Responsible Provider: Teagan Stearns MD    Anesthesia Type: General ASA Status: 3            Anesthesia Type: General    Socrates Phase I: Socrates Score: 9    Socrates Phase II:      Anesthesia Post Evaluation    Patient location during evaluation: PACU  Patient participation: complete - patient participated  Level of consciousness: awake and alert  Airway patency: patent  Nausea & Vomiting: no nausea and no vomiting  Cardiovascular status: blood pressure returned to baseline and hemodynamically stable  Respiratory status: acceptable and spontaneous ventilation  Hydration status: euvolemic  Multimodal analgesia pain management approach  Pain management: adequate    No notable events documented.

## 2024-03-12 NOTE — DISCHARGE INSTRUCTIONS
Home Care   Keep the incision area clean and dry   Okay to take a shower starting tomorrow.   Place ice on incisions to decrease the pain and bruising.   Physical Activity   Walk frequently to prevent blood clots in the legs, but do not do anything that causes pain in the incisions.   Breath deeply every hour to prevent pneumonia.   No heavy lifting over 10lbs for 4-6wks.  Work   Okay to return to work with light duty next week when your pain is controlled   Avoid Heavy lifting while at work   Await follow-up appointment in 2 weeks for full work clearance   Medications   Restart blood thinners in 24 hours if no sign of bleeding   Take Ibuprofen for moderate pain. Use the Oxycodone for more severe pain.   No driving while taking prescription pain medication   Follow-up   Schedule a follow-up appointment with Dr. Guillory in 2 weeks.  Call Your Doctor If Any of the Following Occurs   Signs of infection, including fever and chills   Redness, swelling, increasing pain, excessive bleeding, or discharge at the incision site   Cough, shortness of breath, chest pain   Increased abdominal pain   Nausea and/or vomiting that does not resolve off narcotics.   Pain, burning, urgency or frequency of urination, or blood in the urine   Pain and/or swelling in your feet, calves, or legs   Dark urine, light stools, or evidence of jaundice (yellowing of the skin or eyes).   In case of an emergency, CALL 911 immediately.        DISCHARGE INSTRUCTIONS - FAMILY MEDICINE    Follow up at the [facility] as below. If no appointment has been set up, Please call the clinic to make an appointment with your PCP. If there are any issues and cannot present to your appointment, please contact us at [phone] and we will schedule a new appointment for you.     Future Appointments   Date Time Provider Department Center   3/14/2024 10:40 AM Jabari Owens MD [facility] Critical access hospital:  [address],  [place] [address]         Phone: [phone]    Once discharged from [facility], you can :    Return to work : Yes, you may return to work  Activity : As tolerated  Stairs : As tolerated  Exercise : As tolerated  Lifting : As tolerated   Sexual activity : Yes  Driving : With seat belt on. NO driving on narcotic pain medication if prescribed   Medications : Always take your medications as prescribed  Diet : You are asked to make an attempt to improve diet and exercise patterns to aid in medical management of your medical condition/problem.    Call [facility] with any further questions. Return to Emergency Department with any worsening of your condition and/or fever greater than 101 degrees, new weakness, shortness of breath or chest pain.  ______________________________________________________________________    =====================================   [facility]   DISCHARGE INSTRUCTIONS   =====================================   Take your medications as directed in this summary     Follow up with all your future appointments as advised   Call [phone] to confirm your appointment with [facility] as soon as possible and/or if there are any appointment changes or other issues.     It is important that you follow up with us for better monitoring of the current cause of your hospitalization. Follow up as well with the specialists that saw you during your stay. If you have any questions call us [phone].

## 2024-03-12 NOTE — PROGRESS NOTES
4 Eyes Skin Assessment     NAME:  Delta Gomez  YOB: 1965  MEDICAL RECORD NUMBER:  [id number]    The patient is being assessed for  Admission    I agree that at least one RN has performed a thorough Head to Toe Skin Assessment on the patient. ALL assessment sites listed below have been assessed.      Areas assessed by both nurses:    Head, Face, Ears, Shoulders, Back, Chest, Arms, Elbows, Hands, Sacrum. Buttock, Coccyx, Ischium, and Legs. Feet and Heels        Does the Patient have a Wound? No noted wound(s)       Robert Prevention initiated by RN: No  Wound Care Orders initiated by RN: No    Pressure Injury (Stage 3,4, Unstageable, DTI, NWPT, and Complex wounds) if present, place Wound referral order by RN under : No    New Ostomies, if present place, Ostomy referral order under : No     Nurse 1 eSignature: Electronically signed by Cristel Bhatt RN on 3/12/24 at 2:27 AM EDT    **SHARE this note so that the co-signing nurse can place an eSignature**    Nurse 2 eSignature: Electronically signed by Agnieszka Bahena RN on 3/12/24 at 2:27 AM EDT

## 2024-03-12 NOTE — OP NOTE
Operative Note      Patient: Delta Gomez  YOB: 1965  MRN: [id number]    Date of Procedure: 3/11/2024    Pre-Op Diagnosis Codes:     * Acute cholecystitis [K81.0]    Post-Op Diagnosis: Same and gangrenous cholecystitis       Procedure(s):  LAPAROSCOPIC CHOLECYSTECTOMY    Surgeon(s):  Jay Guillory MD    Assistant:   Resident: Kaila Joseph DO; Justin Evans DO; Carolina Saldana MD    Anesthesia: General    Estimated Blood Loss (mL): less than 50     Complications: None    Specimens:   ID Type Source Tests Collected by Time Destination   A : GALLBLADDER Tissue Gallbladder SURGICAL PATHOLOGY Jay Guillory MD 3/11/2024 5320        Implants:  * No implants in log *      Drains: * No LDAs found *    Findings: gangrenous cholecystitis, signs of active infection in the abdominal cavity, possible posterior perforation of gallbladder, large gallstone        Detailed Description of Procedure:     HISTORY: Delta Gomez is a 58 y.o. year old who presented with acute upper abdominal pain and imaging findings consistent with acute cholecystitis. She presents today for laparoscopic cholecystectomy possible open possible cholangiogram.     OPERATION: The patient was placed on the table in a supine position. She was on scheduled antibiotics which did not require redosing.  SCD's were placed on the legs as DVT prophylaxis. An orogastric tube was placed into the stomach after induction of general anesthesia. The abdomen was prepped and draped in the usual sterile fashion.     A stab incision was made in the left upper quadrant. A Veress needle was used to insufflate the abdomen with CO2 to approximately 15 mm Hg. A 10mm supraumbilical incision to right of midline due to prior umbilical hernia repair was made and A 10 mm Opti-View trochar was placed in the supraumbilical incision after appropriate abdominal insufflation under direct visualization with the laparoscope. 2 5-mm ports was placed in the

## 2024-03-12 NOTE — PROGRESS NOTES
Patient up and ambulated to bathroom.  Patient tolerated well.  Incentive spirometer given and instructed on use.

## 2024-03-12 NOTE — PROGRESS NOTES
SE - Family Medicine Inpatient   Resident Progress Note    S:  Hospital day: 1   Brief Synopsis: Delta Gomez is a 58 y.o. female with a PMH of COPD, hypertension, hyperlipidemia, depression and obesity presented to the emergency department for acute onset right upper quadrant abdominal pain.  Acute constant sharp pain located in the right upper quadrant and epigastrium, radiating to the right flank and back.  Patient reports the pain woke her up yesterday morning and has been persistent since then, gradually getting worse.     At the ED, all vital signs on within normal limits on room air. Labs remarkable for glucose 122, alkaline phosphatase 127, white blood cell count 20.4, hemoglobin 15.9.  Normal liver function enzymes, lipase, lactic acid and total bilirubin. Urinalysis remarkable for glucosuria, small leukocyte esterase, pyuria, bacteria 1+. CT abdomen pelvis with IV contrast remarkable for pericholecystic inflammatory changes and gallbladder wall thickening concerning for acute cholecystitis.  Large gallstone present.  No evidence of biliary dilatation.  Colonic diverticulosis.  Left nephrolithiasis without evidence of acute urinary obstruction.  Hepatic asteatosis. Pain was managed with Dilaudid, Toradol, Zofran. Empiric antibiotic was started with ceftriaxone and metronidazole given    General surgery consulted. They performed a laparoscopic cholecystectomy on the same day of admission. Surgery was uncomplicated. Findings -  gangrenous cholecystitis, signs of active infection in the abdominal cavity, possible posterior perforation of gallbladder and large gallstone were noted.   Next day, patient was asymptomatic. She continued IV abx 24 hrs post op.      Overnight event:  - Patient seen sitting up in bed. She states that she feels so much better today.  - Denies fever, chills, nausea, vomiting, diarrhea, constipation, dysuria, hematuria, urinary frequency and urgency.   - Patient is tolerating diet

## 2024-03-12 NOTE — PROGRESS NOTES
[facility]  Family Medicine Attending    S: 58 y.o. female with PMHx of COPD, HTN, HLD who presented with RUQ abdominal pain x 1-2 days, found to have acute cholecystitis.  Taken to OR on 3/11/24 for lap clay; tolerated procedure well.  Today, pain is controlled.  No symptoms or concerns.  Tolerating diet.      O: VS- Blood pressure 101/65, pulse 74, temperature 96.8 °F (36 °C), temperature source Temporal, resp. rate 16, height 1.651 m (5' 5\"), weight 97.1 kg (214 lb), last menstrual period 09/13/2012, SpO2 92 %, not currently breastfeeding.  Exam is as noted by resident with the following changes, additions or corrections:  Gen NAD, A&A  CV RRR  Resp CTAB     Impressions:   Principal Problem:    Acute cholecystitis  Resolved Problems:    * No resolved hospital problems. *      Plan:   Gen Surg management appreciated.  Completing antibiotics as ordered.  Disposition planning.  Follow up outpatient.       Attending Physician Statement  I have reviewed the chart and seen the patient with the resident(s).  I personally reviewed images, EKG's and similar tests, if present.  I personally reviewed and performed key elements of the history and exam.  I have reviewed and confirmed the Family Medicine admitting team resident history and exam with changes as indicated above.  I agree with the assessment, plan, and orders as documented by the  admitting team resident Saida Sewell/Jeromy/Samy Guerrero.  Please refer to the resident progress note today and admission history and physical  for additional information.      Carolina Kirkland,

## 2024-03-12 NOTE — PROGRESS NOTES
GENERAL SURGERY  DAILY PROGRESS NOTE  3/12/2024  Chief Complaint   Patient presents with    Flank Pain     C/O right flank pain and lower back pain since 0330 Sunday.          Subjective:  Feels well.  Pain well controlled. Feeling much better.  Minimal soreness from surgery    No intake/output data recorded.  I/O this shift:  In: 1000 [I.V.:1000]  Out: 20 [Blood:20]      Objective:  /84   Pulse 89   Temp 98.8 °F (37.1 °C) (Temporal)   Resp 16   Ht 1.651 m (5' 5\")   Wt 97.1 kg (214 lb)   LMP 09/13/2012   SpO2 93%   BMI 35.61 kg/m²     GENERAL:  Laying in bed, awake, alert, cooperative, no apparent distress  HEAD: Normocephalic, atraumatic  EYES: No sclera icterus, pupils equal  LUNGS:  No increased work of breathing  CARDIOVASCULAR:  regular rate  ABDOMEN:  Soft, non-distended. Minimal incisional tenderness.  Incisions c/d/i with dermabond  EXTREMITIES: No edema or swelling  SKIN: Warm and dry, no rashes or lesions    Lab Results   Component Value Date    WBC 20.4 (H) 03/11/2024    HGB 15.9 (H) 03/11/2024    HCT 47.3 03/11/2024    MCV 95.6 03/11/2024     03/11/2024     Lab Results   Component Value Date     03/11/2024    K 3.9 03/11/2024    CL 99 03/11/2024    CO2 29 03/11/2024    BUN 8 03/11/2024    CREATININE 0.7 03/11/2024    GLUCOSE 122 (H) 03/11/2024    CALCIUM 9.3 03/11/2024    PROT 7.8 03/11/2024    LABALBU 4.2 03/11/2024    BILITOT 0.9 03/11/2024    ALKPHOS 127 (H) 03/11/2024    AST 14 03/11/2024    ALT 20 03/11/2024    LABGLOM >60 03/11/2024    GFRAA >60 09/30/2021         Assessment/Plan:  58 y.o. female with gangrenous cholecystitis, cholelithiasis s/p 3/11 laparoscopic cholecystectomy    Advance to regular diet  IV abx for 24h postop  Prn multimodal pain control  Encourage OOB, ambulation  If pain controlled and tolerating diet, possible DC later today  PPX: SCDs, lovenox, IS    Electronically signed by Kaila Joseph DO on 3/12/2024 at 6:00 AM

## 2024-03-12 NOTE — DISCHARGE SUMMARY
known as: ZOLOFT  Take 1 tablet by mouth daily     tiotropium 18 MCG inhalation capsule  Commonly known as: Spiriva HandiHaler  Inhale 1 capsule into the lungs daily               Where to Get Your Medications        These medications were sent to [facility] [address] [place] [address] [address] - P [phone] - F [phone]  [address] [address]      Phone: [phone]   oxyCODONE 5 MG immediate release tablet         Consults:  general surgery    Significant Diagnostic Studies:      Labs:  Na/K/Cl/CO2:  142/3.8/106/23 (03/12 0446)  BUN/Cr/glu/ALT/AST/amyl/lip:  10/0.8/--/40/34/--/-- (03/12 0446)  WBC/Hgb/Hct/Plts:  17.3/14.4/43.8/296 (03/12 0637)  [unfilled]  estimated creatinine clearance is 90 mL/min (based on SCr of 0.8 mg/dL).    New Imaging:  CT ABDOMEN PELVIS W IV CONTRAST Additional Contrast? None    Result Date: 3/11/2024  EXAMINATION: CT OF THE ABDOMEN AND PELVIS WITH CONTRAST3/11/2024 1:36 pm TECHNIQUE: CT of the abdomen and pelvis was performed with the administration of intravenous contrast. Multiplanar reformatted images are provided for review. Automated exposure control, iterative reconstruction, and/or weight based adjustment of the mA/kV was utilized to reduce the radiation dose to as low as reasonably achievable. COMPARISON: Report of a CT of the abdomen and pelvis dated 06/07/2018. HISTORY: ORDERING SYSTEM PROVIDED HISTORY: llq abdominal pain, wraps around TECHNOLOGIST PROVIDED HISTORY: Reason for exam:->llq abdominal pain, wraps around Additional Contrast?->None Decision Support Exception - unselect if not a suspected or confirmed emergency medical condition->Emergency Medical Condition (MA) What reading provider will be dictating this exam?->CRC FINDINGS: Calcified granulomata are noted at both lung bases.  There is no evidence of pleural effusion.  Nodular scarring is present at the posterolateral right lung base. There is diffuse low-attenuation of the hepatic  breath sounds.   Abdominal:      General: Bowel sounds are normal.      Tenderness: There is no abdominal tenderness. There is no guarding.      Comments: Mild ecchymosis around lap sutures.    Musculoskeletal:         General: Normal range of motion.   Neurological:      Mental Status: She is alert and oriented to person, place, and time.     Disposition:   home    Future Appointments   Date Time Provider Department Center   3/14/2024 10:40 AM Jabari Owens MD [facility]       More than 30 minutes was spent in preparation of this patient's discharge including, but not limited to, examination, preparation of documents, prescription preparation, counseling and coordination.    Signed:  Riya Pinzon MD  3/12/2024, 3:58 PM

## 2024-03-12 NOTE — ANESTHESIA PRE PROCEDURE
syringe  2,000 mg IntraVENous Q24H Jabari Owens MD       • [START ON 3/12/2024] metroNIDAZOLE (FLAGYL) 500 mg in 0.9% NaCl 100 mL IVPB premix  500 mg IntraVENous Q8H Jabari Owens MD       • oxyCODONE (ROXICODONE) immediate release tablet 5 mg  5 mg Oral Q4H PRN Jabari Owens MD        Or   • oxyCODONE HCl (OXY-IR) immediate release tablet 10 mg  10 mg Oral Q4H PRN Jabari Owens MD       • acetaminophen (TYLENOL) tablet 650 mg  650 mg Oral Q4H PRN Jabari Owens MD       • HYDROmorphone (DILAUDID) injection 0.5 mg  0.5 mg IntraVENous Q4H PRN Jabari Owens MD       • [START ON 3/12/2024] amLODIPine (NORVASC) tablet 5 mg  5 mg Oral Daily Jabari Ownes MD       • ipratropium 0.5 mg-albuterol 2.5 mg (DUONEB) nebulizer solution 1 Dose  1 Dose Inhalation Q6H PRN Jabari Owens MD       • [START ON 3/12/2024] pantoprazole (PROTONIX) tablet 40 mg  40 mg Oral QAM AC Jabari Owens MD       • sodium chloride flush 0.9 % injection 5-40 mL  5-40 mL IntraVENous 2 times per day Jabari Owens MD       • sodium chloride flush 0.9 % injection 5-40 mL  5-40 mL IntraVENous PRN Jabari Owens MD       • 0.9 % sodium chloride infusion   IntraVENous PRN Jabari Owens MD       • ondansetron (ZOFRAN-ODT) disintegrating tablet 4 mg  4 mg Oral Q8H PRN Jabari Owens MD        Or   • ondansetron (ZOFRAN) injection 4 mg  4 mg IntraVENous Q6H PRN Jabari Owens MD       • polyethylene glycol (GLYCOLAX) packet 17 g  17 g Oral Daily PRN Jabari Owens MD       • [START ON 3/12/2024] atorvastatin (LIPITOR) tablet 10 mg  10 mg Oral Daily Jabari Owens MD       • [START ON 3/12/2024] sertraline (ZOLOFT) tablet 50 mg  50 mg Oral Daily Jabari Owens MD       • ipratropium (ATROVENT) 0.02 % nebulizer solution 0.5 mg  0.5 mg

## 2024-03-13 ENCOUNTER — TELEPHONE (OUTPATIENT)
Dept: FAMILY MEDICINE CLINIC | Age: 59
End: 2024-03-13

## 2024-03-13 LAB
MICROORGANISM SPEC CULT: ABNORMAL
SPECIMEN DESCRIPTION: ABNORMAL

## 2024-03-13 NOTE — TELEPHONE ENCOUNTER
Care Transitions Initial Follow Up Call    Outreach made within 2 business days of discharge: Yes    Patient: Delta Gomez Patient : 1965   MRN: [id number]  Reason for Admission: There are no discharge diagnoses documented for the most recent discharge.  Discharge Date: 3/12/24       Spoke with: patient    [facility] Patient Contact:  Was patient able to fill all prescriptions: Yes  Was patient instructed to bring all medications to the follow-up visit: Yes  Is patient taking all medications as directed in the discharge summary? Yes  Does patient understand their discharge instructions: Yes  Does patient have questions or concerns that need addressed prior to 7-14 day follow up office visit: no  Reminded of appt tomorrow in office    Scheduled appointment with PCP within 7-14 days    Follow Up  Future Appointments   Date Time Provider Department Center   3/14/2024 10:40 AM Jabari Owens MD [facility]       [place]ston Arana RN

## 2024-03-14 ENCOUNTER — OFFICE VISIT (OUTPATIENT)
Dept: FAMILY MEDICINE CLINIC | Age: 59
End: 2024-03-14

## 2024-03-14 VITALS
RESPIRATION RATE: 16 BRPM | HEIGHT: 65 IN | SYSTOLIC BLOOD PRESSURE: 126 MMHG | HEART RATE: 81 BPM | BODY MASS INDEX: 35.32 KG/M2 | DIASTOLIC BLOOD PRESSURE: 75 MMHG | OXYGEN SATURATION: 96 % | TEMPERATURE: 98.1 F | WEIGHT: 212 LBS

## 2024-03-14 DIAGNOSIS — Z12.11 COLON CANCER SCREENING: ICD-10-CM

## 2024-03-14 DIAGNOSIS — Z90.49 STATUS POST CHOLECYSTECTOMY: Primary | ICD-10-CM

## 2024-03-14 DIAGNOSIS — K81.0 GANGRENOUS CHOLECYSTITIS: ICD-10-CM

## 2024-03-14 DIAGNOSIS — Z00.00 HEALTHCARE MAINTENANCE: ICD-10-CM

## 2024-03-14 LAB
ALK PHOS BONE SPECIFIC: 43 U/L (ref 0–55)
ALK PHOS OTHER CALC: 0 U/L
ALK PHOSPHATASE: 107 U/L (ref 40–120)
ALKALINE PHOSPHATASE LIVER FRACTION: 64 U/L (ref 0–94)
SURGICAL PATHOLOGY REPORT: NORMAL

## 2024-03-14 PROCEDURE — 3078F DIAST BP <80 MM HG: CPT

## 2024-03-14 PROCEDURE — 99213 OFFICE O/P EST LOW 20 MIN: CPT

## 2024-03-14 PROCEDURE — 3074F SYST BP LT 130 MM HG: CPT

## 2024-03-14 RX ORDER — IBUPROFEN 800 MG/1
800 TABLET ORAL EVERY 8 HOURS PRN
Qty: 30 TABLET | Refills: 0 | Status: SHIPPED | OUTPATIENT
Start: 2024-03-14 | End: 2024-03-24

## 2024-03-14 NOTE — PROGRESS NOTES
[facility]  FAMILY MEDICINE RESIDENCY PROGRAM  DATE OF VISIT : 3/14/2024    Patient : Delta Gomez   Age : 58 y.o.    : 1965   MRN : [id number]   ________________________________________________________________    Chief Complaint:   Chief Complaint   Patient presents with    Post-Op Check     Patient presents today for a post op appt on her recent sx she had on Monday 3/11/24 for a cholecystectomy.       HPI:   History obtained from the patient. Delta Gomez is a 58 y.o. female who presents to the clinic for hospital follow-up.    Patient admitted for acute cholecystitis.  General surgery consulted.  Status post laparoscopic cholecystectomy.  Postoperative diagnosis of gangrenous cholecystitis.  IV antibiotic for 24 hours completed.  Nausea, vomiting and abdominal pain improved.  Patient discharged home with Tylenol and oxycodone for pain control.  Taking oxycodone nightly.  Taking Tylenol as needed during the day.  Abdominal pain has significantly improved.  Occasional nausea.  Having regular bowel movements.  Denies any emesis, fever or bloody stools.      I reviewed the patient's past medications, allergies, past medical history, past surgical history, family history and social history during this visit      ROS:  Pertinent positives and negatives are stated within HPI    Physical Exam:    Vitals: /75 (Site: Left Upper Arm, Position: Sitting, Cuff Size: Medium Adult)   Pulse 81   Temp 98.1 °F (36.7 °C) (Temporal)   Resp 16   Ht 1.651 m (5' 5\")   Wt 96.2 kg (212 lb)   LMP 2012   SpO2 96%   BMI 35.28 kg/m²   Physical Exam  Vitals reviewed.   Constitutional:       General: She is not in acute distress.     Appearance: She is obese.   Cardiovascular:      Rate and Rhythm: Normal rate and regular rhythm.      Pulses: Normal pulses.      Heart sounds: Normal heart sounds.   Pulmonary:      Effort: Pulmonary effort is normal. No respiratory distress.      Breath sounds:

## 2024-03-14 NOTE — PROGRESS NOTES
S: 58 y.o. female here for hospital follow up.  She was in the hospital with acute cholecystitis.  She is now S/P cholecystectomy.  She did have some IV abx in the hospital, but required non at discharge.  Pain is improving.  Currently on tylenol and oxycodone.  Date of procedure was 3/11/24.  She is requesting FMLA paperwork to be completed.  No recommendations noted by surgery for when to return to work.  Her work does involve occasional heavy lifting.  No other concerns today.    O: VS: /75 (Site: Left Upper Arm, Position: Sitting, Cuff Size: Medium Adult)   Pulse 81   Temp 98.1 °F (36.7 °C) (Temporal)   Resp 16   Ht 1.651 m (5' 5\")   Wt 96.2 kg (212 lb)   LMP 09/13/2012   SpO2 96%   BMI 35.28 kg/m²    General: NAD, appropriate affect and grooming   CV:  RRR, no gallops, rubs, or murmurs   Resp: CTAB   Abd:  Soft, mildly tender to palpation; incision sites are healing well.  No evidence of infection; CDI   Ext:  No edema    Impression/Plan:   Acute cholecystitis S/P cholecystectomy-POD 3  Patient requires FMLA forms for work  No clear recommendations made by surgery regarding return to work  Recommend patient follow up with general surgery ASAP for completion of FMLA forms and recommendations on return to work (job does require occasional heavy lifting)  Wean oxycodone as possible; okay to add ibuprofen to help with pain control; continue tylenol prn      Attending Physician Statement  I have discussed the case, including pertinent history and exam findings with the resident.  I agree with the documented assessment and plan.

## 2024-03-18 NOTE — PROGRESS NOTES
Physician Progress Note      PATIENT:               JIM GU  CSN #:                  [id number]  :                       1965  ADMIT DATE:       3/11/2024 6:41 PM  DISCH DATE:        3/12/2024 3:04 PM  RESPONDING  PROVIDER #:        Adry Torres DO          QUERY TEXT:    Pt admitted with Acute cholecystitis. Pt noted to have elevated WBC, HR. If   possible, please document in the progress notes and discharge summary if you   are evaluating and /or treating any of the following:    The medical record reflects the following:  Risk Factors: Acute cholecystitis with perforation and necrosis  Clinical Indicators: DS 3/12 ?Findings- gangrenous cholecystitis, signs of   active infection in the abdominal cavity, possible posterior perforation of   gallbladder and large gallstone were noted.  WBC-20.4, 17.3  T 100.6  HR- 93,94,95,94,98  Treatment: IV Flagyl, IV Rocephin, IVF, Serial lab      Thank you,  Susanna Hernandez, [facility] CDS  Options provided:  -- Sepsis due to Acute cholecystitis with perforation, present on admission  -- Acute cholecystitis with perforation without Sepsis  -- Other - I will add my own diagnosis  -- Disagree - Not applicable / Not valid  -- Disagree - Clinically unable to determine / Unknown  -- Refer to Clinical Documentation Reviewer    PROVIDER RESPONSE TEXT:    This patient has sepsis due to Acute cholecystitis with perforation which was   present on admission.    Query created by: Susanna Hernandez on 3/18/2024 5:33 AM      Electronically signed by:  Adry Torres DO 3/18/2024 8:57 AM

## 2024-04-02 ENCOUNTER — OFFICE VISIT (OUTPATIENT)
Dept: SURGERY | Age: 59
End: 2024-04-02

## 2024-04-02 VITALS
RESPIRATION RATE: 18 BRPM | HEART RATE: 80 BPM | WEIGHT: 205 LBS | TEMPERATURE: 98.7 F | SYSTOLIC BLOOD PRESSURE: 126 MMHG | BODY MASS INDEX: 34.16 KG/M2 | DIASTOLIC BLOOD PRESSURE: 81 MMHG | HEIGHT: 65 IN

## 2024-04-02 DIAGNOSIS — Z90.49 S/P LAPAROSCOPIC CHOLECYSTECTOMY: Primary | ICD-10-CM

## 2024-04-02 PROCEDURE — 99024 POSTOP FOLLOW-UP VISIT: CPT | Performed by: STUDENT IN AN ORGANIZED HEALTH CARE EDUCATION/TRAINING PROGRAM

## 2024-04-02 NOTE — PROGRESS NOTES
Subjective:       Delta Gomez presents to the clinic s/p laparoscopic cholecystectomy 3/11/2024 for acute gangrenous cholecystitis. She informed me today she is doing well.  Eating a low fat diet without difficulty. Bowel movements are Normal.  Pain is controlled without any medications..      Objective:      /81   Pulse 80   Temp 98.7 °F (37.1 °C) (Infrared)   Resp 18   Ht 1.651 m (5' 5\")   Wt 93 kg (205 lb)   LMP 09/13/2012   BMI 34.11 kg/m²     General:  alert, appears stated age, and cooperative   Abdomen: soft, bowel sounds active, non-tender   Incision:   healing well, no drainage, no erythema, no hernia, no seroma, no swelling, no dehiscence, incision well approximated       -- Diagnosis --   Gallbladder, cholecystectomy: Acute cholecystitis with necrosis   Cholelithiasis     Assessment:      Doing well postoperatively.      Plan:      1. Continue any current medications.  2. Incision care discussed.  3. Return to full duty in 1 week.  4. Follow up: PRN

## 2024-05-30 DIAGNOSIS — E78.00 PURE HYPERCHOLESTEROLEMIA: ICD-10-CM

## 2024-05-30 DIAGNOSIS — I10 PRIMARY HYPERTENSION: ICD-10-CM

## 2024-05-30 DIAGNOSIS — F32.A ANXIETY AND DEPRESSION: ICD-10-CM

## 2024-05-30 DIAGNOSIS — F41.9 ANXIETY AND DEPRESSION: ICD-10-CM

## 2024-05-30 RX ORDER — AMLODIPINE BESYLATE 5 MG/1
5 TABLET ORAL DAILY
Qty: 90 TABLET | Refills: 0 | Status: SHIPPED | OUTPATIENT
Start: 2024-05-30

## 2024-05-30 RX ORDER — ATORVASTATIN CALCIUM 10 MG/1
10 TABLET, FILM COATED ORAL DAILY
Qty: 90 TABLET | Refills: 0 | Status: SHIPPED | OUTPATIENT
Start: 2024-05-30

## 2024-05-30 NOTE — TELEPHONE ENCOUNTER
Last Appointment:  3/1/2024  No future appointments.     Patient stated she needed to pay bill down before she can come in for office visit.

## 2024-08-27 DIAGNOSIS — F41.9 ANXIETY AND DEPRESSION: ICD-10-CM

## 2024-08-27 DIAGNOSIS — F32.A ANXIETY AND DEPRESSION: ICD-10-CM

## 2024-08-27 DIAGNOSIS — I10 PRIMARY HYPERTENSION: ICD-10-CM

## 2024-08-27 DIAGNOSIS — E78.00 PURE HYPERCHOLESTEROLEMIA: ICD-10-CM

## 2024-08-27 RX ORDER — AMLODIPINE BESYLATE 5 MG/1
5 TABLET ORAL DAILY
Qty: 90 TABLET | Refills: 0 | Status: SHIPPED | OUTPATIENT
Start: 2024-08-27

## 2024-08-27 RX ORDER — ATORVASTATIN CALCIUM 10 MG/1
10 TABLET, FILM COATED ORAL DAILY
Qty: 90 TABLET | Refills: 0 | Status: SHIPPED | OUTPATIENT
Start: 2024-08-27

## 2024-08-27 NOTE — TELEPHONE ENCOUNTER
Refilled medications and asked that she schedule an appointment via [facility].  Thank you!

## 2024-08-27 NOTE — TELEPHONE ENCOUNTER
Refills ordered as requested.  Please make an appointment to follow up in our office.  Thank you!

## 2024-11-18 ENCOUNTER — TELEPHONE (OUTPATIENT)
Dept: FAMILY MEDICINE CLINIC | Age: 59
End: 2024-11-18

## 2024-11-18 NOTE — TELEPHONE ENCOUNTER
----- Message from Terri RECINOS sent at 11/18/2024  9:33 AM EST -----  Regarding: [facility] Appointment Request  [facility] Appointment Request    Patient needs appointment for [facility] Appointment Type: Existing Condition Follow Up.    Patient Requested Dates(s): Must be Friday  Patient Requested Time: Early morning  Provider Name: Any provider at the office    Reason for Appointment Request: Established Patient - Available appointments did not meet patient need    Additional Info: Patient called in wanted to request an appointment either an in person or a virtual visit to see a provider to refill her medication since  she will be running out of medication beginning next week.    --------------------------------------------------------------------------------------------------------------------------    Relationship to Patient: Self     Call Back Information: OK to leave message on voicemail  Preferred Call Back Number: Phone : [phone]

## 2024-11-19 SDOH — ECONOMIC STABILITY: FOOD INSECURITY: WITHIN THE PAST 12 MONTHS, YOU WORRIED THAT YOUR FOOD WOULD RUN OUT BEFORE YOU GOT MONEY TO BUY MORE.: PATIENT DECLINED

## 2024-11-19 SDOH — ECONOMIC STABILITY: TRANSPORTATION INSECURITY
IN THE PAST 12 MONTHS, HAS LACK OF TRANSPORTATION KEPT YOU FROM MEETINGS, WORK, OR FROM GETTING THINGS NEEDED FOR DAILY LIVING?: NO

## 2024-11-19 SDOH — ECONOMIC STABILITY: INCOME INSECURITY: HOW HARD IS IT FOR YOU TO PAY FOR THE VERY BASICS LIKE FOOD, HOUSING, MEDICAL CARE, AND HEATING?: PATIENT DECLINED

## 2024-11-19 SDOH — ECONOMIC STABILITY: FOOD INSECURITY: WITHIN THE PAST 12 MONTHS, THE FOOD YOU BOUGHT JUST DIDN'T LAST AND YOU DIDN'T HAVE MONEY TO GET MORE.: PATIENT DECLINED

## 2024-11-22 ENCOUNTER — OFFICE VISIT (OUTPATIENT)
Dept: FAMILY MEDICINE CLINIC | Age: 59
End: 2024-11-22

## 2024-11-22 VITALS
HEART RATE: 84 BPM | TEMPERATURE: 97.9 F | HEIGHT: 63 IN | OXYGEN SATURATION: 96 % | BODY MASS INDEX: 39.16 KG/M2 | RESPIRATION RATE: 16 BRPM | DIASTOLIC BLOOD PRESSURE: 62 MMHG | WEIGHT: 221 LBS | SYSTOLIC BLOOD PRESSURE: 124 MMHG

## 2024-11-22 DIAGNOSIS — I10 PRIMARY HYPERTENSION: Primary | ICD-10-CM

## 2024-11-22 DIAGNOSIS — M77.11 RIGHT LATERAL EPICONDYLITIS: ICD-10-CM

## 2024-11-22 DIAGNOSIS — F41.9 ANXIETY AND DEPRESSION: ICD-10-CM

## 2024-11-22 DIAGNOSIS — M19.172 POST-TRAUMATIC ARTHRITIS OF LEFT ANKLE: ICD-10-CM

## 2024-11-22 DIAGNOSIS — J44.9 CHRONIC OBSTRUCTIVE PULMONARY DISEASE, UNSPECIFIED COPD TYPE (HCC): ICD-10-CM

## 2024-11-22 DIAGNOSIS — Z12.11 SCREEN FOR COLON CANCER: ICD-10-CM

## 2024-11-22 DIAGNOSIS — J30.2 SEASONAL ALLERGIC RHINITIS, UNSPECIFIED TRIGGER: ICD-10-CM

## 2024-11-22 DIAGNOSIS — Z23 NEED FOR TDAP VACCINATION: ICD-10-CM

## 2024-11-22 DIAGNOSIS — F32.A ANXIETY AND DEPRESSION: ICD-10-CM

## 2024-11-22 DIAGNOSIS — R73.09 ELEVATED GLUCOSE: ICD-10-CM

## 2024-11-22 DIAGNOSIS — E78.00 PURE HYPERCHOLESTEROLEMIA: ICD-10-CM

## 2024-11-22 DIAGNOSIS — Z12.31 ENCOUNTER FOR SCREENING MAMMOGRAM FOR MALIGNANT NEOPLASM OF BREAST: ICD-10-CM

## 2024-11-22 DIAGNOSIS — K21.9 GASTROESOPHAGEAL REFLUX DISEASE, UNSPECIFIED WHETHER ESOPHAGITIS PRESENT: ICD-10-CM

## 2024-11-22 LAB
ALBUMIN: 4 G/DL (ref 3.5–5.2)
ALP BLD-CCNC: 135 U/L (ref 35–104)
ALT SERPL-CCNC: 23 U/L (ref 0–32)
ANION GAP SERPL CALCULATED.3IONS-SCNC: 13 MMOL/L (ref 7–16)
AST SERPL-CCNC: 22 U/L (ref 0–31)
BILIRUB SERPL-MCNC: 0.4 MG/DL (ref 0–1.2)
BUN BLDV-MCNC: 13 MG/DL (ref 6–20)
CALCIUM SERPL-MCNC: 9.5 MG/DL (ref 8.6–10.2)
CHLORIDE BLD-SCNC: 101 MMOL/L (ref 98–107)
CHOLESTEROL, TOTAL: 185 MG/DL
CO2: 24 MMOL/L (ref 22–29)
CREAT SERPL-MCNC: 0.6 MG/DL (ref 0.5–1)
GFR, ESTIMATED: >90 ML/MIN/1.73M2
GLUCOSE BLD-MCNC: 139 MG/DL (ref 74–99)
HBA1C MFR BLD: 6.6 % (ref 4–5.6)
HCT VFR BLD CALC: 47.9 % (ref 34–48)
HDLC SERPL-MCNC: 42 MG/DL
HEMOGLOBIN: 15.4 G/DL (ref 11.5–15.5)
LDL CHOLESTEROL: 122 MG/DL
MCH RBC QN AUTO: 31.4 PG (ref 26–35)
MCHC RBC AUTO-ENTMCNC: 32.2 G/DL (ref 32–34.5)
MCV RBC AUTO: 97.8 FL (ref 80–99.9)
PDW BLD-RTO: 12.9 % (ref 11.5–15)
PLATELET # BLD: 353 K/UL (ref 130–450)
PMV BLD AUTO: 9.2 FL (ref 7–12)
POTASSIUM SERPL-SCNC: 4.2 MMOL/L (ref 3.5–5)
RBC # BLD: 4.9 M/UL (ref 3.5–5.5)
SODIUM BLD-SCNC: 138 MMOL/L (ref 132–146)
TOTAL PROTEIN: 7.6 G/DL (ref 6.4–8.3)
TRIGL SERPL-MCNC: 107 MG/DL
TSH SERPL DL<=0.05 MIU/L-ACNC: 1.08 UIU/ML (ref 0.27–4.2)
VLDLC SERPL CALC-MCNC: 21 MG/DL
WBC # BLD: 9.4 K/UL (ref 4.5–11.5)

## 2024-11-22 RX ORDER — ATORVASTATIN CALCIUM 10 MG/1
10 TABLET, FILM COATED ORAL DAILY
Qty: 90 TABLET | Refills: 1 | Status: SHIPPED | OUTPATIENT
Start: 2024-11-22

## 2024-11-22 RX ORDER — AMLODIPINE BESYLATE 5 MG/1
5 TABLET ORAL DAILY
Qty: 90 TABLET | Refills: 1 | Status: SHIPPED | OUTPATIENT
Start: 2024-11-22

## 2024-11-22 RX ORDER — LORATADINE 10 MG/1
10 TABLET ORAL DAILY
COMMUNITY
Start: 2024-11-22

## 2024-11-22 NOTE — PROGRESS NOTES
CC:  Follow up HTN, HLD    HPI:  59 y.o. female presents for follow up.      HTN.  HLD.  BP controlled. Tolerating medications.  Working on weight loss.  Not eating after 6 PM.  Smaller portions, more vegetables, less carbs.  Discussed healthy proteins and fibers.  3 oz burger, potato, brussels sprouts.  Smaller plate.  Had been exercising, arm and leg weights.  Moving more.  Drinking more water, Fuji.  Powerade zero.  Cut out sugary drinks.  Went to [place] in September for cruise.  Dog has to be put down today.  She is coping with this overall.  Normal B/B function.  No CP or SOB.     COPD by history, has nebulizer and inhaler, has not needed it lately.  No symptoms.  Has plenty of albuterol at home.  Quit tobacco about 6 years ago.  Feeling much better.  Has Spiriva at home, but has not used this; will remove from medication list.  Allergies, using loratadine intermittently, OTC, controlled.      GERD. Taking OTC Nexium.  Discussed lifestyle modifications and consideration for stopping the medication gradually.  Discussed the approach to tapering/stopping the medication and the temporary rebound symptoms that can occur and how to manage this.       Anxiety and depression, stable, taking Sertraline. Would like refills.       Chronic left ankle pain.  Wants a handicap placard.  Hard with work for ankle, left.  Chronic left ankle pain, posttraumatic arthritis.  Walks 2 miles per day.  OTC meds occasionally.  Voltaren gel did not help.  Uses other topicals.  Support, wraps ankle. Discussed that she does not currently meet criteria for handicap placard, and she understands.        No refill needed for epipens.  Advised to check expiration date, and please reach out if she needs new pens.  Bee allergy, less likely to be problematic over the winter, but advised to have current epipens with her as precaution.      Discussed vaccines.  Initially declined vaccines today.  Discussed recommendations for hep B, Shingrix, COVID,

## 2025-01-03 LAB — NONINV COLON CA DNA+OCC BLD SCRN STL QL: NEGATIVE

## 2025-01-05 NOTE — PROGRESS NOTES
Here for follow-up of her CT results. She had imaging in regards to neck fullness that was causing her pain. CT imaging essentially unchanged from previously with lung granulomas, thyroid nodules. CT imaging also found palatine tonsillar abscess and left sided isolated lymphadenopathy likely reactive. In regards to her neck fullness and pain she says this is improving it typically only happens in the morning and she believes it is positional.    In regards to the abscess she does endorse occasional difficulty swallowing food as if food gets stuck in her throat. She is not nauseous or vomiting. This only happens with solid foods. She does have a history of smoking quit 2 years ago and alcoholism quit 20 years ago. Denies weight loss, hematemesis, melena. She denies fevers or chills. Denies hoarseness of voice. She is not particularly interested in having abscess removed. Blood pressure 129/83, pulse 95, temperature 98.3 °F (36.8 °C), temperature source Oral, height 5' 5\" (1.651 m), weight 228 lb (103.4 kg), last menstrual period 09/13/2012, SpO2 97 %, not currently breastfeeding. HEENT WNL     Heart regular    Lungs clear    abd non-tender      No edema    Pulses intact       Could not appreciate edema or tonsillar swelling, uvula midline minimal postnasal drip and cobblestoning    No appreciable lymphadenopathy    Assessment and plan  [place] tonsillar abscess: Recommendation for ENT and antibiotics particularly if abscess becomes symptomatic    Will consider EGD in the future if persistent problems with swallowing food. But will put off in light of pandemic    Claritin for allergies    Consult ENT re palatine abscess    Attending Physician Statement  I have discussed the case, including pertinent history and exam findings with the resident. I agree with the documented assessment and plan.
procedures with GI    Return if symptoms worsen or fail to improve. An electronic signature was used to authenticate this note.     --Osmar Horvath MD on 4/1/2020 at 3:24 PM
no one

## 2025-02-28 SDOH — ECONOMIC STABILITY: FOOD INSECURITY: WITHIN THE PAST 12 MONTHS, YOU WORRIED THAT YOUR FOOD WOULD RUN OUT BEFORE YOU GOT MONEY TO BUY MORE.: NEVER TRUE

## 2025-02-28 SDOH — ECONOMIC STABILITY: FOOD INSECURITY: WITHIN THE PAST 12 MONTHS, THE FOOD YOU BOUGHT JUST DIDN'T LAST AND YOU DIDN'T HAVE MONEY TO GET MORE.: NEVER TRUE

## 2025-02-28 SDOH — ECONOMIC STABILITY: INCOME INSECURITY: IN THE LAST 12 MONTHS, WAS THERE A TIME WHEN YOU WERE NOT ABLE TO PAY THE MORTGAGE OR RENT ON TIME?: NO

## 2025-02-28 SDOH — ECONOMIC STABILITY: TRANSPORTATION INSECURITY
IN THE PAST 12 MONTHS, HAS THE LACK OF TRANSPORTATION KEPT YOU FROM MEDICAL APPOINTMENTS OR FROM GETTING MEDICATIONS?: NO

## 2025-02-28 ASSESSMENT — PATIENT HEALTH QUESTIONNAIRE - PHQ9
3. TROUBLE FALLING OR STAYING ASLEEP: SEVERAL DAYS
10. IF YOU CHECKED OFF ANY PROBLEMS, HOW DIFFICULT HAVE THESE PROBLEMS MADE IT FOR YOU TO DO YOUR WORK, TAKE CARE OF THINGS AT HOME, OR GET ALONG WITH OTHER PEOPLE: SOMEWHAT DIFFICULT
5. POOR APPETITE OR OVEREATING: NOT AT ALL
5. POOR APPETITE OR OVEREATING: NOT AT ALL
1. LITTLE INTEREST OR PLEASURE IN DOING THINGS: NOT AT ALL
SUM OF ALL RESPONSES TO PHQ QUESTIONS 1-9: 2
9. THOUGHTS THAT YOU WOULD BE BETTER OFF DEAD, OR OF HURTING YOURSELF: NOT AT ALL
2. FEELING DOWN, DEPRESSED OR HOPELESS: NOT AT ALL
8. MOVING OR SPEAKING SO SLOWLY THAT OTHER PEOPLE COULD HAVE NOTICED. OR THE OPPOSITE, BEING SO FIGETY OR RESTLESS THAT YOU HAVE BEEN MOVING AROUND A LOT MORE THAN USUAL: NOT AT ALL
7. TROUBLE CONCENTRATING ON THINGS, SUCH AS READING THE NEWSPAPER OR WATCHING TELEVISION: NOT AT ALL
SUM OF ALL RESPONSES TO PHQ QUESTIONS 1-9: 2
8. MOVING OR SPEAKING SO SLOWLY THAT OTHER PEOPLE COULD HAVE NOTICED. OR THE OPPOSITE - BEING SO FIDGETY OR RESTLESS THAT YOU HAVE BEEN MOVING AROUND A LOT MORE THAN USUAL: NOT AT ALL
2. FEELING DOWN, DEPRESSED OR HOPELESS: NOT AT ALL
3. TROUBLE FALLING OR STAYING ASLEEP: SEVERAL DAYS
6. FEELING BAD ABOUT YOURSELF - OR THAT YOU ARE A FAILURE OR HAVE LET YOURSELF OR YOUR FAMILY DOWN: NOT AT ALL
SUM OF ALL RESPONSES TO PHQ QUESTIONS 1-9: 2
7. TROUBLE CONCENTRATING ON THINGS, SUCH AS READING THE NEWSPAPER OR WATCHING TELEVISION: NOT AT ALL
4. FEELING TIRED OR HAVING LITTLE ENERGY: SEVERAL DAYS
SUM OF ALL RESPONSES TO PHQ QUESTIONS 1-9: 2
9. THOUGHTS THAT YOU WOULD BE BETTER OFF DEAD, OR OF HURTING YOURSELF: NOT AT ALL
6. FEELING BAD ABOUT YOURSELF - OR THAT YOU ARE A FAILURE OR HAVE LET YOURSELF OR YOUR FAMILY DOWN: NOT AT ALL
10. IF YOU CHECKED OFF ANY PROBLEMS, HOW DIFFICULT HAVE THESE PROBLEMS MADE IT FOR YOU TO DO YOUR WORK, TAKE CARE OF THINGS AT HOME, OR GET ALONG WITH OTHER PEOPLE: SOMEWHAT DIFFICULT
SUM OF ALL RESPONSES TO PHQ QUESTIONS 1-9: 2
1. LITTLE INTEREST OR PLEASURE IN DOING THINGS: NOT AT ALL
4. FEELING TIRED OR HAVING LITTLE ENERGY: SEVERAL DAYS

## 2025-03-03 ENCOUNTER — OFFICE VISIT (OUTPATIENT)
Dept: FAMILY MEDICINE CLINIC | Age: 60
End: 2025-03-03

## 2025-03-03 VITALS
BODY MASS INDEX: 37.15 KG/M2 | RESPIRATION RATE: 18 BRPM | HEIGHT: 65 IN | OXYGEN SATURATION: 96 % | WEIGHT: 223 LBS | HEART RATE: 84 BPM | DIASTOLIC BLOOD PRESSURE: 69 MMHG | TEMPERATURE: 97.9 F | SYSTOLIC BLOOD PRESSURE: 129 MMHG

## 2025-03-03 DIAGNOSIS — Z87.891 PERSONAL HISTORY OF TOBACCO USE: ICD-10-CM

## 2025-03-03 DIAGNOSIS — Z12.2 SCREENING FOR LUNG CANCER: ICD-10-CM

## 2025-03-03 DIAGNOSIS — F41.9 ANXIETY AND DEPRESSION: ICD-10-CM

## 2025-03-03 DIAGNOSIS — E78.00 PURE HYPERCHOLESTEROLEMIA: ICD-10-CM

## 2025-03-03 DIAGNOSIS — E11.9 TYPE 2 DIABETES MELLITUS WITHOUT COMPLICATION, WITHOUT LONG-TERM CURRENT USE OF INSULIN (HCC): ICD-10-CM

## 2025-03-03 DIAGNOSIS — F32.A ANXIETY AND DEPRESSION: ICD-10-CM

## 2025-03-03 DIAGNOSIS — M19.172 POST-TRAUMATIC ARTHRITIS OF LEFT ANKLE: ICD-10-CM

## 2025-03-03 DIAGNOSIS — J44.9 CHRONIC OBSTRUCTIVE PULMONARY DISEASE, UNSPECIFIED COPD TYPE (HCC): ICD-10-CM

## 2025-03-03 DIAGNOSIS — K21.9 GASTROESOPHAGEAL REFLUX DISEASE, UNSPECIFIED WHETHER ESOPHAGITIS PRESENT: ICD-10-CM

## 2025-03-03 DIAGNOSIS — I10 PRIMARY HYPERTENSION: Primary | ICD-10-CM

## 2025-03-03 PROCEDURE — 3078F DIAST BP <80 MM HG: CPT | Performed by: FAMILY MEDICINE

## 2025-03-03 PROCEDURE — G0296 VISIT TO DETERM LDCT ELIG: HCPCS | Performed by: FAMILY MEDICINE

## 2025-03-03 PROCEDURE — 3074F SYST BP LT 130 MM HG: CPT | Performed by: FAMILY MEDICINE

## 2025-03-03 PROCEDURE — G2211 COMPLEX E/M VISIT ADD ON: HCPCS | Performed by: FAMILY MEDICINE

## 2025-03-03 PROCEDURE — 99214 OFFICE O/P EST MOD 30 MIN: CPT | Performed by: FAMILY MEDICINE

## 2025-03-03 RX ORDER — ATORVASTATIN CALCIUM 10 MG/1
10 TABLET, FILM COATED ORAL DAILY
Qty: 90 TABLET | Refills: 1 | Status: SHIPPED | OUTPATIENT
Start: 2025-03-03

## 2025-03-03 RX ORDER — AMLODIPINE BESYLATE 5 MG/1
5 TABLET ORAL DAILY
Qty: 90 TABLET | Refills: 1 | Status: SHIPPED | OUTPATIENT
Start: 2025-03-03

## 2025-03-03 NOTE — PROGRESS NOTES
that limits life expectancy or the ability to have lung surgery.    The patient  reports that she quit smoking about 6 years ago. Her smoking use included cigarettes. She started smoking about 42 years ago. She has a 84 pack-year smoking history. She has been exposed to tobacco smoke. She has never used smokeless tobacco.. Discussed with patient the risks and benefits of screening, including over-diagnosis, false positive rate, and total radiation exposure.  The patient currently exhibits no signs or symptoms suggestive of lung cancer.  Discussed with patient the importance of compliance with yearly annual lung cancer screenings and willingness to undergo diagnosis and treatment if screening scan is positive.  In addition, the patient was counseled regarding the importance of remaining smoke free and/or total smoking cessation.    Also reviewed the following if the patient has Medicare that as of February 10, 2022, Medicare only covers LDCT screening in patients aged 50-77 with at least a 20 pack-year smoking history who currently smoke or have quit in the last 15 years

## 2025-04-23 ENCOUNTER — RESULTS FOLLOW-UP (OUTPATIENT)
Dept: FAMILY MEDICINE CLINIC | Age: 60
End: 2025-04-23

## 2025-04-23 ENCOUNTER — HOSPITAL ENCOUNTER (OUTPATIENT)
Dept: CT IMAGING | Age: 60
Discharge: HOME | End: 2025-04-25
Attending: FAMILY MEDICINE

## 2025-04-23 DIAGNOSIS — Z12.2 SCREENING FOR LUNG CANCER: ICD-10-CM

## 2025-04-23 DIAGNOSIS — Z87.891 PERSONAL HISTORY OF TOBACCO USE: ICD-10-CM

## 2025-04-23 PROCEDURE — 71271 CT THORAX LUNG CANCER SCR C-: CPT

## 2025-04-25 DIAGNOSIS — D49.1 PLEURAL NEOPLASM: ICD-10-CM

## 2025-04-25 DIAGNOSIS — R91.1 PULMONARY NODULE: ICD-10-CM

## 2025-04-25 DIAGNOSIS — R91.8 ABNORMAL CT LUNG SCREENING: Primary | ICD-10-CM

## 2025-04-25 NOTE — PROGRESS NOTES
Discussed results of screening Lung CT with patient. Discussed recommended follow up. Will place order for PET CT as recommended.  Patient would like to be referred to Dr. Sung for pulmonology.  Referral placed.    Please reach out with questions/concerns or if no one calls to schedule her within the next couple of days.   Thank you!

## 2025-05-06 ENCOUNTER — RESULTS FOLLOW-UP (OUTPATIENT)
Dept: FAMILY MEDICINE CLINIC | Age: 60
End: 2025-05-06

## 2025-05-06 ENCOUNTER — HOSPITAL ENCOUNTER (OUTPATIENT)
Dept: NUCLEAR MEDICINE | Age: 60
Discharge: HOME | End: 2025-05-08
Attending: FAMILY MEDICINE

## 2025-05-06 DIAGNOSIS — D49.1 PLEURAL NEOPLASM: ICD-10-CM

## 2025-05-06 DIAGNOSIS — R91.1 PULMONARY NODULE: ICD-10-CM

## 2025-05-06 DIAGNOSIS — R91.8 ABNORMAL CT LUNG SCREENING: ICD-10-CM

## 2025-05-06 PROCEDURE — 78815 PET IMAGE W/CT SKULL-THIGH: CPT

## 2025-05-06 PROCEDURE — A9609 HC RX DIAGNOSTIC RADIOPHARMACEUTICAL: HCPCS | Performed by: RADIOLOGY

## 2025-05-06 PROCEDURE — 3430000000 HC RX DIAGNOSTIC RADIOPHARMACEUTICAL: Performed by: RADIOLOGY

## 2025-05-06 RX ORDER — FLUDEOXYGLUCOSE F 18 200 MCI/ML
13.1 INJECTION, SOLUTION INTRAVENOUS
Status: COMPLETED | OUTPATIENT
Start: 2025-05-06 | End: 2025-05-06

## 2025-05-06 RX ADMIN — FLUDEOXYGLUCOSE F 18 13.1 MILLICURIE: 200 INJECTION, SOLUTION INTRAVENOUS at 08:15

## 2025-06-10 ENCOUNTER — RESULTS FOLLOW-UP (OUTPATIENT)
Dept: FAMILY MEDICINE CLINIC | Age: 60
End: 2025-06-10

## 2025-06-10 ENCOUNTER — OFFICE VISIT (OUTPATIENT)
Dept: FAMILY MEDICINE CLINIC | Age: 60
End: 2025-06-10

## 2025-06-10 VITALS
BODY MASS INDEX: 38.15 KG/M2 | OXYGEN SATURATION: 95 % | TEMPERATURE: 98.4 F | WEIGHT: 229 LBS | HEIGHT: 65 IN | HEART RATE: 83 BPM | RESPIRATION RATE: 16 BRPM | SYSTOLIC BLOOD PRESSURE: 122 MMHG | DIASTOLIC BLOOD PRESSURE: 72 MMHG

## 2025-06-10 DIAGNOSIS — I10 PRIMARY HYPERTENSION: ICD-10-CM

## 2025-06-10 DIAGNOSIS — E11.9 TYPE 2 DIABETES MELLITUS WITHOUT COMPLICATION, WITHOUT LONG-TERM CURRENT USE OF INSULIN (HCC): Primary | ICD-10-CM

## 2025-06-10 DIAGNOSIS — B35.1 ONYCHOMYCOSIS: ICD-10-CM

## 2025-06-10 DIAGNOSIS — R93.89 ABNORMAL CHEST CT: ICD-10-CM

## 2025-06-10 DIAGNOSIS — M19.172 POST-TRAUMATIC ARTHRITIS OF LEFT ANKLE: ICD-10-CM

## 2025-06-10 LAB — HBA1C MFR BLD: 7.4 %

## 2025-06-10 PROCEDURE — 3051F HG A1C>EQUAL 7.0%<8.0%: CPT | Performed by: FAMILY MEDICINE

## 2025-06-10 PROCEDURE — 99214 OFFICE O/P EST MOD 30 MIN: CPT | Performed by: FAMILY MEDICINE

## 2025-06-10 PROCEDURE — 3074F SYST BP LT 130 MM HG: CPT | Performed by: FAMILY MEDICINE

## 2025-06-10 PROCEDURE — 3078F DIAST BP <80 MM HG: CPT | Performed by: FAMILY MEDICINE

## 2025-06-10 PROCEDURE — 83036 HEMOGLOBIN GLYCOSYLATED A1C: CPT | Performed by: FAMILY MEDICINE

## 2025-06-10 RX ORDER — METFORMIN HYDROCHLORIDE 500 MG/1
500 TABLET, EXTENDED RELEASE ORAL
Qty: 90 TABLET | Refills: 1 | Status: SHIPPED | OUTPATIENT
Start: 2025-06-10

## 2025-06-10 RX ORDER — CICLOPIROX 80 MG/ML
SOLUTION TOPICAL
Qty: 1 EACH | Refills: 2 | Status: SHIPPED | OUTPATIENT
Start: 2025-06-10

## 2025-06-10 NOTE — PROGRESS NOTES
CC:  Follow up elevated A1C, HTN    HPI:  59 y.o. female presents for follow up.      Elevated A1C. Cutting down on carbs.  Cutting down on pasta.  Lean proteins.  Cutting out sugars from food.  A1C increased to 7.4 today.  Weight increased.  Willing to start medication.  Currently self pay; has employment but missed enrollment window.  No insurance until next year.      HTN.  BP controlled on recheck.  No new symptoms or concerns.  Taking medications.      Lung CT with concerns for increase in what appeared to be pleural-based lesion, previously thought to be scar tissue.  PET low level metabolism.  Recommended repeat CT in 3 months.  Leaving  until September.  Vacation in [place] in [place].  Plan for recheck CT scan before that time, about  would be 3 months for follow up; prefers AM appointment.  Order placed.  Former smoker.      Chronic left ankle pain, posttraumatic arthritis with hardware in place.  Pain anterior proximal ankle is more prominent.  No redness or swelling.  No recent injury; on feet a lot for work. Using brace/sleeve.  Would like X-rays.      Tattoo left forearm; small, looked like a mole.  Patient had the tattoo placed years ago and plans to tattoo over this because it ran together.  Has no growth or change, no new skin lesion. Advised to seek care with any changes/growth or skin lesions.       Offered epipen refill; she has these at home, she states.  Advised to check if  and call for refills.  She agrees.      Patient Active Problem List    Diagnosis Date Noted    Thyroid nodule 2019    Type 2 diabetes mellitus without complication, without long-term current use of insulin (HCC) 2025    Right lateral epicondylitis 2024    Acute cholecystitis 2024    BMI 35.0-35.9,adult 2024    Acute left-sided low back pain without sciatica 2024    Anxiety and depression 2023    Postmenopausal 08/10/2020    Pure hypercholesterolemia

## 2025-06-30 ENCOUNTER — TELEPHONE (OUTPATIENT)
Dept: FAMILY MEDICINE CLINIC | Age: 60
End: 2025-06-30

## 2025-06-30 NOTE — TELEPHONE ENCOUNTER
----- Message from Catracho ANN sent at 6/30/2025  1:14 PM EDT -----  Regarding: [facility] Appointment Request  [facility] Appointment Request    Patient needs appointment for [facility] Appointment Type: Existing Condition Follow Up for ADA     Patient Requested Dates(s):   July 7, 2025  Patient Requested Time:  Anytime would be fine    Provider Name:  Carolinascout Kirkland DO    Reason for Appointment Request: Established Patient - Available appointments did not meet patient need  --------------------------------------------------------------------------------------------------------------------------    Relationship to Patient: Self     Call Back Information: OK to leave message on voicemail  Preferred Call Back Number: [phone] (home)

## 2025-06-30 NOTE — TELEPHONE ENCOUNTER
Spoke with patient she needs paperwork filled out and  Dr Kirkland is scheduled out until September, patient nees to be seen sooner and Please advice.

## 2025-07-23 ENCOUNTER — PATIENT MESSAGE (OUTPATIENT)
Dept: FAMILY MEDICINE CLINIC | Age: 60
End: 2025-07-23

## 2025-08-10 DIAGNOSIS — R93.89 ABNORMAL CHEST CT: Primary | ICD-10-CM

## 2025-08-10 DIAGNOSIS — R91.1 LESION OF RIGHT LUNG: ICD-10-CM

## 2025-08-10 DIAGNOSIS — E04.1 THYROID NODULE: Chronic | ICD-10-CM

## (undated) DEVICE — GLOVE SURG SZ 75 STD WHT LTX SYN POLYMER BEAD REINF ANTI RL

## (undated) DEVICE — ZIMMER® STERILE DISPOSABLE TOURNIQUET CUFF WITH PLC, DUAL PORT, SINGLE BLADDER, 18 IN. (46 CM)

## (undated) DEVICE — PUMP SUC IRR TBNG L10FT W/ HNDPC ASSEMB STRYKEFLOW 2

## (undated) DEVICE — 3M™ IOBAN™ 2 ANTIMICROBIAL INCISE DRAPE 6640EZ: Brand: IOBAN™ 2

## (undated) DEVICE — GENERAL LAPAROSCOPY: Brand: MEDLINE INDUSTRIES, INC.

## (undated) DEVICE — TROCARS: Brand: KII® BALLOON BLUNT TIP SYSTEM

## (undated) DEVICE — SURGICAL PROCEDURE PACK HND

## (undated) DEVICE — STANDARD (U) BLADE ASSEMBLY 6PK: Brand: MICROAIRE®

## (undated) DEVICE — TRAY,VAG PREP,2PR VNYL GLV,4 C: Brand: MEDLINE INDUSTRIES, INC.

## (undated) DEVICE — GAUZE 2X2IN ST BORDERED

## (undated) DEVICE — CANNULA NSL ORAL AD FOR CAPNOFLEX CO2 O2 AIRLFE

## (undated) DEVICE — GARMENT,MEDLINE,DVT,INT,CALF,MED, GEN2: Brand: MEDLINE

## (undated) DEVICE — PACK,AURORA,LAVH: Brand: MEDLINE

## (undated) DEVICE — PAD SANITARY CRTY MTRN REG ADH STRP DISP NS

## (undated) DEVICE — BAG PRSS INFUS 1000ML 2 PRSS SFTY VLV RIG HNGR SLIP EASILY

## (undated) DEVICE — GOWN,SIRUS,FABRNF,XL,20/CS: Brand: MEDLINE

## (undated) DEVICE — COVER,LIGHT HANDLE,FLX,2/PK: Brand: MEDLINE INDUSTRIES, INC.

## (undated) DEVICE — INSUFFLATION NEEDLE TO ESTABLISH PNEUMOPERITONEUM.: Brand: INSUFFLATION NEEDLE

## (undated) DEVICE — 4-PORT MANIFOLD: Brand: NEPTUNE 2

## (undated) DEVICE — TROCAR: Brand: KII® SLEEVE

## (undated) DEVICE — LAPAROSCOPIC SCISSORS: Brand: EPIX LAPAROSCOPIC SCISSORS

## (undated) DEVICE — Device

## (undated) DEVICE — WARMER SCP 2 ANTIFOG LAP DISP

## (undated) DEVICE — GLOVE SURG BEAD CUF 6 STD PF WHT STRL TRIUMPH LT LTX

## (undated) DEVICE — SET 30 DEGREE HYSTEROSCOPE

## (undated) DEVICE — PADDING,UNDERCAST,COTTON, 3X4YD STERILE: Brand: MEDLINE

## (undated) DEVICE — ELECTRODE ES 36CM LAP FLAT L HK COAT DISP CLEANCOAT

## (undated) DEVICE — COVER HNDL LT DISP

## (undated) DEVICE — DOUBLE BASIN SET: Brand: MEDLINE INDUSTRIES, INC.

## (undated) DEVICE — ELECTRODE PT RET AD L9FT HI MOIST COND ADH HYDRGEL CORDED

## (undated) DEVICE — GLOVE ORANGE PI 7 1/2   MSG9075

## (undated) DEVICE — PAD,NON-ADHERENT,2X3,STERILE,LF,1/PK: Brand: MEDLINE

## (undated) DEVICE — TROCAR: Brand: KII FIOS FIRST ENTRY

## (undated) DEVICE — INTENDED FOR TISSUE SEPARATION, AND OTHER PROCEDURES THAT REQUIRE A SHARP SURGICAL BLADE TO PUNCTURE OR CUT.: Brand: BARD-PARKER ® STAINLESS STEEL BLADES

## (undated) DEVICE — TOWEL,OR,DSP,ST,BLUE,STD,6/PK,12PK/CS: Brand: MEDLINE

## (undated) DEVICE — SOLUTION IV IRRIG POUR BRL 0.9% SODIUM CHL 2F7124

## (undated) DEVICE — TRAY PROCED DILATATION CURETTAGE

## (undated) DEVICE — SOLUTION IV IRRIG WATER 1000ML POUR BRL 2F7114

## (undated) DEVICE — SOLUTION IRRIG 3000ML 0.9% SOD CHL USP UROMATIC PLAS CONT

## (undated) DEVICE — DISSECTOR LAP DIA5MM BLNT TIP ENDOPATH

## (undated) DEVICE — NEEDLE CLOSURE OMNICLOSE

## (undated) DEVICE — TISSUE RETRIEVAL SYSTEM: Brand: INZII RETRIEVAL SYSTEM

## (undated) DEVICE — APPLIER CLP M/L SHFT DIA5MM 15 LIG LIGAMAX 5

## (undated) DEVICE — CYSTO/BLADDER IRRIGATION SET, REGULATING CLAMP

## (undated) DEVICE — GAUZE,SPONGE,4"X4",16PLY,XRAY,STRL,LF: Brand: MEDLINE